# Patient Record
Sex: FEMALE | Race: WHITE | Employment: OTHER | URBAN - NONMETROPOLITAN AREA
[De-identification: names, ages, dates, MRNs, and addresses within clinical notes are randomized per-mention and may not be internally consistent; named-entity substitution may affect disease eponyms.]

---

## 2017-11-14 ENCOUNTER — HOSPITAL ENCOUNTER (OUTPATIENT)
Dept: NON INVASIVE DIAGNOSTICS | Age: 67
Discharge: HOME | End: 2017-11-14
Payer: MEDICARE

## 2017-11-14 PROCEDURE — 93270 REMOTE 30 DAY ECG REV/REPORT: CPT

## 2018-03-01 ENCOUNTER — OFFICE VISIT (OUTPATIENT)
Dept: FAMILY MEDICINE CLINIC | Age: 68
End: 2018-03-01
Payer: MEDICARE

## 2018-03-01 ENCOUNTER — TELEPHONE (OUTPATIENT)
Dept: FAMILY MEDICINE CLINIC | Age: 68
End: 2018-03-01

## 2018-03-01 ENCOUNTER — HOSPITAL ENCOUNTER (OUTPATIENT)
Dept: GENERAL RADIOLOGY | Age: 68
Discharge: HOME | End: 2018-03-01
Payer: MEDICARE

## 2018-03-01 ENCOUNTER — HOSPITAL ENCOUNTER (OUTPATIENT)
Age: 68
Discharge: HOME | End: 2018-03-01
Payer: MEDICARE

## 2018-03-01 VITALS
SYSTOLIC BLOOD PRESSURE: 130 MMHG | DIASTOLIC BLOOD PRESSURE: 86 MMHG | WEIGHT: 150 LBS | HEART RATE: 68 BPM | BODY MASS INDEX: 26.58 KG/M2 | HEIGHT: 63 IN

## 2018-03-01 DIAGNOSIS — M25.511 CHRONIC RIGHT SHOULDER PAIN: ICD-10-CM

## 2018-03-01 DIAGNOSIS — G89.29 CHRONIC RIGHT SHOULDER PAIN: Primary | ICD-10-CM

## 2018-03-01 DIAGNOSIS — G89.29 CHRONIC RIGHT SHOULDER PAIN: ICD-10-CM

## 2018-03-01 DIAGNOSIS — I49.9 IRREGULAR HEART BEAT: ICD-10-CM

## 2018-03-01 DIAGNOSIS — M25.511 CHRONIC RIGHT SHOULDER PAIN: Primary | ICD-10-CM

## 2018-03-01 DIAGNOSIS — M85.89 OSTEOPENIA OF MULTIPLE SITES: ICD-10-CM

## 2018-03-01 DIAGNOSIS — E78.00 PURE HYPERCHOLESTEROLEMIA: ICD-10-CM

## 2018-03-01 DIAGNOSIS — M19.011 PRIMARY OSTEOARTHRITIS OF RIGHT SHOULDER: Primary | ICD-10-CM

## 2018-03-01 DIAGNOSIS — I10 ESSENTIAL HYPERTENSION: ICD-10-CM

## 2018-03-01 PROCEDURE — 99204 OFFICE O/P NEW MOD 45 MIN: CPT | Performed by: FAMILY MEDICINE

## 2018-03-01 PROCEDURE — 73030 X-RAY EXAM OF SHOULDER: CPT

## 2018-03-01 RX ORDER — EZETIMIBE 10 MG/1
10 TABLET ORAL DAILY
COMMUNITY
End: 2018-03-01 | Stop reason: SDUPTHER

## 2018-03-01 RX ORDER — ALENDRONATE SODIUM 70 MG/1
70 TABLET ORAL
Qty: 12 TABLET | Refills: 2 | Status: SHIPPED | OUTPATIENT
Start: 2018-03-01 | End: 2018-03-05 | Stop reason: SDUPTHER

## 2018-03-01 RX ORDER — ALENDRONATE SODIUM 70 MG/1
70 TABLET ORAL
COMMUNITY
End: 2018-03-01 | Stop reason: SDUPTHER

## 2018-03-01 RX ORDER — ROSUVASTATIN CALCIUM 20 MG/1
20 TABLET, COATED ORAL DAILY
Qty: 90 TABLET | Refills: 1 | Status: SHIPPED | OUTPATIENT
Start: 2018-03-01 | End: 2018-03-05 | Stop reason: SDUPTHER

## 2018-03-01 RX ORDER — ROSUVASTATIN CALCIUM 20 MG/1
20 TABLET, COATED ORAL DAILY
COMMUNITY
End: 2018-03-01 | Stop reason: SDUPTHER

## 2018-03-01 RX ORDER — LOSARTAN POTASSIUM 50 MG/1
50 TABLET ORAL DAILY
COMMUNITY
End: 2018-03-01 | Stop reason: SDUPTHER

## 2018-03-01 RX ORDER — LOSARTAN POTASSIUM 50 MG/1
50 TABLET ORAL DAILY
Qty: 90 TABLET | Refills: 1 | Status: SHIPPED | OUTPATIENT
Start: 2018-03-01 | End: 2018-03-05 | Stop reason: SDUPTHER

## 2018-03-01 RX ORDER — EZETIMIBE 10 MG/1
10 TABLET ORAL DAILY
Qty: 90 TABLET | Refills: 1 | Status: SHIPPED | OUTPATIENT
Start: 2018-03-01 | End: 2018-03-05 | Stop reason: SDUPTHER

## 2018-03-01 ASSESSMENT — ENCOUNTER SYMPTOMS
CHEST TIGHTNESS: 0
COLOR CHANGE: 0
DIARRHEA: 0
EYE REDNESS: 0
EYE DISCHARGE: 0
BLOOD IN STOOL: 0
CONSTIPATION: 0
SHORTNESS OF BREATH: 0

## 2018-03-01 ASSESSMENT — PATIENT HEALTH QUESTIONNAIRE - PHQ9
SUM OF ALL RESPONSES TO PHQ9 QUESTIONS 1 & 2: 0
2. FEELING DOWN, DEPRESSED OR HOPELESS: 0
SUM OF ALL RESPONSES TO PHQ QUESTIONS 1-9: 0
1. LITTLE INTEREST OR PLEASURE IN DOING THINGS: 0

## 2018-03-01 NOTE — PATIENT INSTRUCTIONS
arm (palm outward) behind your back by the wrist. Pull your arm up gently to stretch your shoulder. 3. Next, put a towel over your other shoulder. Put the hand of your injured arm behind your back. Now hold the back end of the towel. With the other hand, hold the front end of the towel in front of your body. Pull gently on the front end of the towel. This will bring your hand farther up your back to stretch your shoulder. Overhead stretch    1. Standing about an arm's length away, grasp onto a solid surface. You could use a countertop, a doorknob, or the back of a sturdy chair. 2. With your knees slightly bent, bend forward with your arms straight. Lower your upper body, and let your shoulders stretch. 3. As your shoulders are able to stretch farther, you may need to take a step or two backward. 4. Hold for at least 15 to 30 seconds. Then stand up and relax. If you had stepped back during your stretch, step forward so you can keep your hands on the solid surface. 5. Repeat 2 to 4 times. Shoulder flexion (lying down)    To make a wand for this exercise, use a piece of PVC pipe or a broom handle with the broom removed. Make the wand about a foot wider than your shoulders. 1. Lie on your back, holding a wand with both hands. Your palms should face down as you hold the wand. 2. Keeping your elbows straight, slowly raise your arms over your head. Raise them until you feel a stretch in your shoulders, upper back, and chest.  3. Hold for 15 to 30 seconds. 4. Repeat 2 to 4 times. Shoulder rotation (lying down)    To make a wand for this exercise, use a piece of PVC pipe or a broom handle with the broom removed. Make the wand about a foot wider than your shoulders. 1. Lie on your back. Hold a wand with both hands with your elbows bent and palms up. 2. Keep your elbows close to your body, and move the wand across your body toward the sore arm. 3. Hold for 8 to 12 seconds. 4. Repeat 2 to 4 times.   Wall climbing (to the side)    Avoid any movement that is straight to your side, and be careful not to arch your back. Your arm should stay about 30 degrees to the front of your side. 1. Stand with your side to a wall so that your fingers can just touch it at an angle about 30 degrees toward the front of your body. 2. Walk the fingers of your injured arm up the wall as high as pain permits. Try not to shrug your shoulder up toward your ear as you move your arm up. 3. Hold that position for a count of at least 15 to 20.  4. Walk your fingers back down to the starting position. 5. Repeat at least 2 to 4 times. Try to reach higher each time. Wall climbing (to the front)    During this stretching exercise, be careful not to arch your back. 1. Face a wall, and stand so your fingers can just touch it. 2. Keeping your shoulder down, walk the fingers of your injured arm up the wall as high as pain permits. (Don't shrug your shoulder up toward your ear.)  3. Hold your arm in that position for at least 15 to 30 seconds. 4. Slowly walk your fingers back down to where you started. 5. Repeat at least 2 to 4 times. Try to reach higher each time. Shoulder blade squeeze    1. Stand with your arms at your sides, and squeeze your shoulder blades together. Do not raise your shoulders up as you squeeze. 2. Hold 6 seconds. 3. Repeat 8 to 12 times. Scapular exercise: Arm reach    1. Lie flat on your back. This exercise is a very slight motion that starts with your arms raised (elbows straight, arms straight). 2. From this position, reach higher toward the yesy or ceiling. Keep your elbows straight. All motion should be from your shoulder blade only. 3. Relax your arms back to where you started. 4. Repeat 8 to 12 times. Arm raise to the side    During this strengthening exercise, your arm should stay about 30 degrees to the front of your side. 1. Slowly raise your injured arm to the side, with your thumb facing up.  Raise your arm 60

## 2018-03-01 NOTE — PROGRESS NOTES
[address]  [address] [facility], [facility], [place]  Phone:  [phone]  Fax:  [phone]         Name: Aj Jensen  : 1950         Chief Complaint:     Chief Complaint   Patient presents with    New Patient     right shoulder pain, feels sometimes it pops at times   [facility] Maintenance     Colonoscopy 2017 Dr Winter Brewster-        History of Present Illness:     Britt Rios is a 78 yo female who presents today to establish as a new patient for evaluation of right shoulder pain that began a few months ago with no inciting injury. Pain is worse with certain motions like reaching behind her back. Shoulder Pain    The pain is present in the right shoulder. The current episode started more than 1 month ago. There has been no history of extremity trauma. The problem occurs intermittently. The problem has been gradually worsening. The quality of the pain is described as aching. The pain is moderate. Associated symptoms include a limited range of motion. Pertinent negatives include no fever, itching, numbness or tingling. The symptoms are aggravated by activity. She has tried rest for the symptoms. Hypertension   This is a chronic problem. The current episode started more than 1 year ago. The problem is unchanged. The problem is controlled. Associated symptoms include palpitations. Pertinent negatives include no chest pain, headaches, peripheral edema or shortness of breath. There are no associated agents to hypertension. Risk factors for coronary artery disease include dyslipidemia and post-menopausal state. Past treatments include angiotensin blockers. The current treatment provides moderate improvement. There are no compliance problems. Hyperlipidemia   This is a chronic problem. The current episode started more than 1 year ago. Associated symptoms include myalgias. Pertinent negatives include no chest pain or shortness of breath.  Current antihyperlipidemic

## 2018-03-01 NOTE — TELEPHONE ENCOUNTER
----- Message from Brenda Alba MD sent at 3/1/2018  2:21 PM EST -----  Please let the patient know that her shoulder x-ray revealed moderate degenerative changes/arthritis. We can try a course of PT or if it's bothering her enough, I can refer her to Ortho for further evaluation. Please determine which she would prefer and where she'd want to do therapy.

## 2018-03-05 RX ORDER — ALENDRONATE SODIUM 70 MG/1
70 TABLET ORAL
Qty: 12 TABLET | Refills: 2 | Status: SHIPPED | OUTPATIENT
Start: 2018-03-05 | End: 2018-11-13 | Stop reason: SDUPTHER

## 2018-03-05 RX ORDER — EZETIMIBE 10 MG/1
10 TABLET ORAL DAILY
Qty: 90 TABLET | Refills: 1 | Status: SHIPPED | OUTPATIENT
Start: 2018-03-05 | End: 2018-09-01 | Stop reason: SDUPTHER

## 2018-03-05 RX ORDER — ROSUVASTATIN CALCIUM 20 MG/1
20 TABLET, COATED ORAL DAILY
Qty: 90 TABLET | Refills: 1 | Status: SHIPPED | OUTPATIENT
Start: 2018-03-05 | End: 2018-09-01 | Stop reason: SDUPTHER

## 2018-03-05 RX ORDER — LOSARTAN POTASSIUM 50 MG/1
50 TABLET ORAL DAILY
Qty: 90 TABLET | Refills: 1 | Status: SHIPPED | OUTPATIENT
Start: 2018-03-05 | End: 2018-09-01 | Stop reason: SDUPTHER

## 2018-03-06 ENCOUNTER — TELEPHONE (OUTPATIENT)
Dept: FAMILY MEDICINE CLINIC | Age: 68
End: 2018-03-06

## 2018-03-06 ENCOUNTER — HOSPITAL ENCOUNTER (OUTPATIENT)
Age: 68
Discharge: HOME | End: 2018-03-06
Payer: MEDICARE

## 2018-03-06 DIAGNOSIS — I49.9 IRREGULAR HEART BEAT: ICD-10-CM

## 2018-03-06 LAB
ANION GAP SERPL CALCULATED.3IONS-SCNC: 13 MEQ/L (ref 8–16)
BUN BLDV-MCNC: 15 MG/DL (ref 7–22)
CALCIUM SERPL-MCNC: 10 MG/DL (ref 8.5–10.5)
CHLORIDE BLD-SCNC: 103 MEQ/L (ref 98–111)
CO2: 28 MEQ/L (ref 23–33)
CREAT SERPL-MCNC: 0.5 MG/DL (ref 0.4–1.2)
GFR SERPL CREATININE-BSD FRML MDRD: > 90 ML/MIN/1.73M2
GLUCOSE BLD-MCNC: 91 MG/DL (ref 70–108)
POTASSIUM SERPL-SCNC: 4.9 MEQ/L (ref 3.5–5.2)
SODIUM BLD-SCNC: 144 MEQ/L (ref 135–145)
TSH SERPL DL<=0.05 MIU/L-ACNC: 2.28 UIU/ML (ref 0.4–4.2)

## 2018-03-06 PROCEDURE — 84443 ASSAY THYROID STIM HORMONE: CPT

## 2018-03-06 PROCEDURE — 80048 BASIC METABOLIC PNL TOTAL CA: CPT

## 2018-03-06 PROCEDURE — 36415 COLL VENOUS BLD VENIPUNCTURE: CPT

## 2018-03-06 NOTE — TELEPHONE ENCOUNTER
----- Message from Dana Lopez MD sent at 3/6/2018  3:37 PM EST -----  Please let the patient know that her labs were WNL.

## 2018-03-27 ENCOUNTER — TELEPHONE (OUTPATIENT)
Dept: FAMILY MEDICINE CLINIC | Age: 68
End: 2018-03-27

## 2018-03-27 ENCOUNTER — HOSPITAL ENCOUNTER (OUTPATIENT)
Dept: WOMENS IMAGING | Age: 68
Discharge: HOME | End: 2018-03-27
Payer: MEDICARE

## 2018-03-27 DIAGNOSIS — M85.89 OSTEOPENIA OF MULTIPLE SITES: ICD-10-CM

## 2018-03-27 PROCEDURE — 77080 DXA BONE DENSITY AXIAL: CPT

## 2018-03-27 NOTE — TELEPHONE ENCOUNTER
----- Message from Raúl Summers MD sent at 3/27/2018  4:40 PM EDT -----  Please let the patient know that her DEXA revealed no increased risk for fracture/no osteoporosis.

## 2018-04-20 ENCOUNTER — HOSPITAL ENCOUNTER (OUTPATIENT)
Dept: MRI IMAGING | Age: 68
Discharge: HOME | End: 2018-04-20
Payer: MEDICARE

## 2018-04-20 DIAGNOSIS — M19.011 ARTHRITIS OF RIGHT SHOULDER REGION: ICD-10-CM

## 2018-04-20 PROCEDURE — 73221 MRI JOINT UPR EXTREM W/O DYE: CPT

## 2018-05-03 ENCOUNTER — OFFICE VISIT (OUTPATIENT)
Dept: FAMILY MEDICINE CLINIC | Age: 68
End: 2018-05-03
Payer: MEDICARE

## 2018-05-03 ENCOUNTER — HOSPITAL ENCOUNTER (OUTPATIENT)
Age: 68
Discharge: HOME | End: 2018-05-03
Payer: MEDICARE

## 2018-05-03 VITALS
SYSTOLIC BLOOD PRESSURE: 112 MMHG | HEART RATE: 38 BPM | DIASTOLIC BLOOD PRESSURE: 72 MMHG | HEIGHT: 63 IN | BODY MASS INDEX: 28 KG/M2 | WEIGHT: 158 LBS

## 2018-05-03 DIAGNOSIS — G89.29 CHRONIC RIGHT SHOULDER PAIN: Primary | ICD-10-CM

## 2018-05-03 DIAGNOSIS — M25.511 CHRONIC RIGHT SHOULDER PAIN: Primary | ICD-10-CM

## 2018-05-03 DIAGNOSIS — R00.1 BRADYCARDIA: ICD-10-CM

## 2018-05-03 LAB
EKG ATRIAL RATE: 71 BPM
EKG P AXIS: 52 DEGREES
EKG P-R INTERVAL: 158 MS
EKG Q-T INTERVAL: 378 MS
EKG QRS DURATION: 74 MS
EKG QTC CALCULATION (BAZETT): 410 MS
EKG R AXIS: 30 DEGREES
EKG T AXIS: 17 DEGREES
EKG VENTRICULAR RATE: 71 BPM

## 2018-05-03 PROCEDURE — 93005 ELECTROCARDIOGRAM TRACING: CPT

## 2018-05-03 PROCEDURE — 99214 OFFICE O/P EST MOD 30 MIN: CPT | Performed by: FAMILY MEDICINE

## 2018-05-03 ASSESSMENT — ENCOUNTER SYMPTOMS
CHEST TIGHTNESS: 0
VOMITING: 0
SHORTNESS OF BREATH: 0
NAUSEA: 0
EYE REDNESS: 0
EYE DISCHARGE: 0
COLOR CHANGE: 0

## 2018-05-04 ENCOUNTER — OFFICE VISIT (OUTPATIENT)
Dept: CARDIOLOGY CLINIC | Age: 68
End: 2018-05-04
Payer: MEDICARE

## 2018-05-04 VITALS
HEART RATE: 56 BPM | DIASTOLIC BLOOD PRESSURE: 72 MMHG | WEIGHT: 151 LBS | SYSTOLIC BLOOD PRESSURE: 136 MMHG | HEIGHT: 63 IN | BODY MASS INDEX: 26.75 KG/M2

## 2018-05-04 DIAGNOSIS — I49.3 PVC'S (PREMATURE VENTRICULAR CONTRACTIONS): ICD-10-CM

## 2018-05-04 DIAGNOSIS — R00.1 BRADYCARDIA: Primary | ICD-10-CM

## 2018-05-04 DIAGNOSIS — I83.93 VARICOSE VEINS OF BOTH LOWER EXTREMITIES: ICD-10-CM

## 2018-05-04 PROCEDURE — 99203 OFFICE O/P NEW LOW 30 MIN: CPT | Performed by: INTERNAL MEDICINE

## 2018-05-04 ASSESSMENT — ENCOUNTER SYMPTOMS
EYE PAIN: 0
CONSTIPATION: 0
EYE ITCHING: 0
SINUS PRESSURE: 0
BLOOD IN STOOL: 0
EYE DISCHARGE: 0
APNEA: 0
SHORTNESS OF BREATH: 0
ABDOMINAL DISTENTION: 0
SORE THROAT: 0
COUGH: 0
ABDOMINAL PAIN: 0
DIARRHEA: 0
EYE REDNESS: 0
BACK PAIN: 0
CHEST TIGHTNESS: 0
NAUSEA: 0

## 2018-08-17 ENCOUNTER — OFFICE VISIT (OUTPATIENT)
Dept: CARDIOLOGY CLINIC | Age: 68
End: 2018-08-17
Payer: MEDICARE

## 2018-08-17 VITALS
WEIGHT: 150.6 LBS | HEART RATE: 84 BPM | SYSTOLIC BLOOD PRESSURE: 149 MMHG | HEIGHT: 63 IN | BODY MASS INDEX: 26.68 KG/M2 | DIASTOLIC BLOOD PRESSURE: 70 MMHG

## 2018-08-17 DIAGNOSIS — I10 ESSENTIAL HYPERTENSION: Primary | ICD-10-CM

## 2018-08-17 DIAGNOSIS — Z01.818 PREOP EXAMINATION: ICD-10-CM

## 2018-08-17 PROCEDURE — 99213 OFFICE O/P EST LOW 20 MIN: CPT | Performed by: INTERNAL MEDICINE

## 2018-08-17 PROCEDURE — 93000 ELECTROCARDIOGRAM COMPLETE: CPT | Performed by: INTERNAL MEDICINE

## 2018-08-17 NOTE — PROGRESS NOTES
[address] [facility]  [address]  [address] [address]  Dept: [phone]  Dept Fax: [phone]  Loc: [phone]    Visit Date: 8/17/2018    Ms. Narciso Domínguez is a 77 y.o. female  who presented for:  Chief Complaint   Patient presents with    3 Month Follow-Up     bradycardia    Hypertension       HPI:   HPI   76 yo F f/u for pre-op evaluation. S/p RLE with phlebectomy. Will be getting LLE phlebectomy. No chest pain, angina, VASQUEZ, orthopnea, PND, sob at rest, palpitations, LE edema, or syncope. ECG with SR/PVCS - asymptomatic. No issues with FOS. No CVA, MI, DM II, or CKD. Current Outpatient Prescriptions:     rosuvastatin (CRESTOR) 20 MG tablet, Take 1 tablet by mouth daily, Disp: 90 tablet, Rfl: 1    alendronate (FOSAMAX) 70 MG tablet, Take 1 tablet by mouth every 7 days, Disp: 12 tablet, Rfl: 2    losartan (COZAAR) 50 MG tablet, Take 1 tablet by mouth daily, Disp: 90 tablet, Rfl: 1    ezetimibe (ZETIA) 10 MG tablet, Take 1 tablet by mouth daily, Disp: 90 tablet, Rfl: 1    aspirin 81 MG tablet, Take 81 mg by mouth daily, Disp: , Rfl:     Multiple Vitamins-Minerals (WOMENS 50+ MULTI VITAMIN/MIN) TABS, Take by mouth, Disp: , Rfl:     Multiple Vitamins-Minerals (OCUVITE EYE HEATLH GUMMIES PO), Take by mouth, Disp: , Rfl:     Calcium Carb-Cholecalciferol (CALTRATE 600+D3 SOFT PO), Take by mouth, Disp: , Rfl:     Past Medical History  Julio Colorado  has a past medical history of Hyperlipidemia; Hypertension; and Osteopenia. Social History  Julio Colorado  reports that she has never smoked. She has never used smokeless tobacco. She reports that she drinks alcohol. She reports that she does not use drugs. Family History  Julio Colorado family history includes Colon Cancer in her mother; Heart Attack in her brother; Heart Disease in her sister; Heart Failure in her father; Stomach Cancer in her paternal grandfather.     There is no family history of bicuspid aortic valve, aneurysms, heart transplant, pacemakers, defibrillators, or sudden cardiac death. Past Surgical History   Past Surgical History:   Procedure Laterality Date    BREAST SURGERY      Dr. Liliya Ballesteros; Benign left breast lesion    COLONOSCOPY      Dr Sharona Barahona   last 8-2017    INGUINAL HERNIA REPAIR      TUBAL LIGATION      VEIN SURGERY Right 08/08/2018    Endoveous Ablation       Review of Systems   Constitutional: Negative for chills and fever  HENT: Negative for congestion, sinus pressure, sneezing and sore throat. Eyes: Negative for pain, discharge, redness and itching. Respiratory: Negative for apnea, cough, chest tightness and shortness of breath. Gastrointestinal: Negative for abdominal distention, abdominal pain, blood in stool, constipation, diarrhea and nausea. Endocrine: Negative for cold intolerance, heat intolerance, polydipsia. Genitourinary: Negative for dysuria, enuresis, flank pain and hematuria. Musculoskeletal: Negative for arthralgias, joint swelling and neck pain. Neurological: Negative for numbness and headaches. Psychiatric/Behavioral: Negative for agitation, confusion, decreased concentration and dysphoric mood. Objective:     BP (!) 158/70   Pulse 84   Ht 5' 3\" (1.6 m)   Wt 150 lb 9.6 oz (68.3 kg)   BMI 26.68 kg/m²     Wt Readings from Last 3 Encounters:   08/17/18 150 lb 9.6 oz (68.3 kg)   05/04/18 151 lb (68.5 kg)   05/03/18 158 lb (71.7 kg)     BP Readings from Last 3 Encounters:   08/17/18 (!) 158/70   05/04/18 136/72   05/03/18 112/72       Nursing note and vitals reviewed. Physical Exam   Constitutional: He is oriented to person, place, and time. He appears well-developed and well-nourished. HENT:   Head: Normocephalic and atraumatic. Eyes: EOM are normal. Pupils are equal, round, and reactive to light. Neck: Normal range of motion. Neck supple. No JVD present.    Cardiovascular: Normal rate, regular rhythm, normal heart sounds and intact distal pulses. No murmur heard. Pulmonary/Chest: Effort normal and breath sounds normal. No respiratory distress. He has no wheezes. He has no rales. Abdominal: Soft. Bowel sounds are normal. He exhibits no distension. There is no tenderness. Musculoskeletal: Normal range of motion. He exhibits no edema. Neurological: He is alert and oriented to person, place, and time. No cranial nerve deficit. Coordination normal.   Skin: Skin is warm and dry. RLE with compression stocking. Psychiatric: He has a normal mood and affect. No results found for: CKTOTAL, CKMB, CKMBINDEX    No results found for: WBC, RBC, HGB, HCT, MCV, MCH, MCHC, RDW, PLT, MPV    Lab Results   Component Value Date     03/06/2018    K 4.9 03/06/2018     03/06/2018    CO2 28 03/06/2018    BUN 15 03/06/2018    CREATININE 0.5 03/06/2018    CALCIUM 10.0 03/06/2018    LABGLOM >90 03/06/2018    GLUCOSE 91 03/06/2018       No results found for: ALKPHOS, ALT, AST, PROT, BILITOT, BILIDIR, IBILI, LABALBU    No results found for: MG    No results found for: INR, PROTIME      No results found for: LABA1C    No results found for: TRIG, HDL, LDLCALC, LDLDIRECT, LABVLDL    Lab Results   Component Value Date    TSH 2.280 03/06/2018         Testing Reviewed:      I have individually reviewed the cardiac test below:    ECHO: No results found for this or any previous visit. Assessment/Plan   Asymptomatic Bradycardia  S/p RLE phlebectomy  Pre-operative CV exam  Possible upcoming Orthopedic surgery (Intermediate to high risk surgery)  RCRI = 0 (Low risk patient)  No active cardiac complaints. No cardiac issues on exams. Able to do > 4 METS. Patient accepts the risk of the planned procedure and understands the possibility of christopher-operative cardiac event, including but not limited to MI, VT/VF, cardiac arrest, and death, and wishes to proceed with the procedure.     No further cardiac testing prior to upcoming

## 2018-08-17 NOTE — PROGRESS NOTES
Pt needs clearance for right shoulder resurfacing on Oct.3 with Dr. Amy Asif. Needs clearance for Left STAB Phlebectomy of Varicose Vein in Sept. With Dr. Pipo Aparicio.    Pt did not bring medication or list, pt stated no changes. EKG done today!

## 2018-09-04 RX ORDER — EZETIMIBE 10 MG/1
TABLET ORAL
Qty: 90 TABLET | Refills: 1 | Status: SHIPPED | OUTPATIENT
Start: 2018-09-04 | End: 2019-03-03 | Stop reason: SDUPTHER

## 2018-09-04 RX ORDER — LOSARTAN POTASSIUM 50 MG/1
TABLET ORAL
Qty: 90 TABLET | Refills: 1 | Status: SHIPPED | OUTPATIENT
Start: 2018-09-04 | End: 2019-03-03 | Stop reason: SDUPTHER

## 2018-09-04 RX ORDER — ROSUVASTATIN CALCIUM 20 MG/1
TABLET, COATED ORAL
Qty: 90 TABLET | Refills: 1 | Status: SHIPPED | OUTPATIENT
Start: 2018-09-04 | End: 2019-03-03 | Stop reason: SDUPTHER

## 2018-09-10 ASSESSMENT — ENCOUNTER SYMPTOMS
SHORTNESS OF BREATH: 0
EYE REDNESS: 0
RHINORRHEA: 0
SORE THROAT: 0
COUGH: 0
VOMITING: 0
NAUSEA: 0

## 2018-09-10 NOTE — PROGRESS NOTES
[address] [place] [address] [address]  Phone:  [phone]  Fax:  [phone]    PREOPERATIVE CLEARANCE     Name: Dannie Jimenez  : 1950         Chief Complaint:     Chief Complaint   Patient presents with    Pre-op Exam     was cleared by Cardiology 2018; surgery with Dr. Amee Irving 10/03/2018       History of Present Illness: The patient is presenting today for preoperative clearance for a right total shoulder arthroplasty with Dr. Joao Juárez at [facility] in [place]. She saw Dr. Rocío Whitt on 18 and was given clearance. Functional capacity:  > 4 mets (can vacuum/do housework, climb a flight of stairs without dyspnea)? Yes    Stress test or cardiac cath within last 5 years? No       If so, results:  N/A  Any change in cardiac symptoms?:  No  Revascularization in last 5 years?:  No       CABG?:  N/A       Stents?:  N/A    Neck pathology?:  No  Sleep apnea?:  No      Past Medical History:     Past Medical History:   Diagnosis Date    Hyperlipidemia     Hypertension     Osteopenia         Past Surgical History:     Past Surgical History:   Procedure Laterality Date    BREAST SURGERY      Dr. Eliza Canela; Benign left breast lesion    COLONOSCOPY      Dr Adore Liang   last     INGUINAL HERNIA REPAIR      TUBAL LIGATION      VEIN SURGERY Right 2018    Endoveous Ablation        Family History:     Family History   Problem Relation Age of Onset    Colon Cancer Mother     Heart Failure Father     Heart Disease Sister     Heart Attack Brother     Stomach Cancer Paternal Grandfather        Social History:     Social:    Social History     Social History    Marital status:      Spouse name: N/A    Number of children: N/A    Years of education: N/A     Occupational History    Not on file.      Social History Main Topics    Smoking status: Never Smoker    Smokeless tobacco: Never Used    Alcohol use Yes      Comment: normal and breath sounds normal. No respiratory distress. She has no wheezes. Abdominal: Soft. Bowel sounds are normal. She exhibits no distension. There is no tenderness. Neurological: She is alert and oriented to person, place, and time. Skin: Skin is warm and dry. No rash noted. No erythema. Psychiatric: She has a normal mood and affect. Her behavior is normal.   Vitals reviewed. Assesment:         Diagnosis Orders   1. Preoperative clearance  CBC Auto Differential    Basic Metabolic Panel   2. Arthritis of right shoulder region         Plan:     The patient is considered low risk for the upcoming surgical procedure. Preoperative paperwork was completed and faxed in. I would like to see Manuel Campos back in Return for previously scheduled appointment in February. or sooner if any new issues occur.     Electronically signed by Torsten Manning MD on 9/11/2018 at 2:19 PM

## 2018-09-11 ENCOUNTER — OFFICE VISIT (OUTPATIENT)
Dept: FAMILY MEDICINE CLINIC | Age: 68
End: 2018-09-11
Payer: MEDICARE

## 2018-09-11 VITALS
HEIGHT: 63 IN | HEART RATE: 66 BPM | WEIGHT: 150 LBS | SYSTOLIC BLOOD PRESSURE: 160 MMHG | DIASTOLIC BLOOD PRESSURE: 84 MMHG | BODY MASS INDEX: 26.58 KG/M2

## 2018-09-11 DIAGNOSIS — M19.011 ARTHRITIS OF RIGHT SHOULDER REGION: ICD-10-CM

## 2018-09-11 DIAGNOSIS — Z01.818 PREOPERATIVE CLEARANCE: Primary | ICD-10-CM

## 2018-09-11 PROCEDURE — 99213 OFFICE O/P EST LOW 20 MIN: CPT | Performed by: FAMILY MEDICINE

## 2018-09-12 ENCOUNTER — TELEPHONE (OUTPATIENT)
Dept: FAMILY MEDICINE CLINIC | Age: 68
End: 2018-09-12

## 2018-09-12 ENCOUNTER — HOSPITAL ENCOUNTER (OUTPATIENT)
Age: 68
Discharge: HOME | End: 2018-09-12
Payer: MEDICARE

## 2018-09-12 DIAGNOSIS — Z01.818 PREOPERATIVE CLEARANCE: ICD-10-CM

## 2018-09-12 LAB
ANION GAP SERPL CALCULATED.3IONS-SCNC: 16 MEQ/L (ref 8–16)
BASOPHILS # BLD: 0.6 %
BASOPHILS ABSOLUTE: 0 THOU/MM3 (ref 0–0.1)
BUN BLDV-MCNC: 18 MG/DL (ref 7–22)
CALCIUM SERPL-MCNC: 9.5 MG/DL (ref 8.5–10.5)
CHLORIDE BLD-SCNC: 103 MEQ/L (ref 98–111)
CO2: 24 MEQ/L (ref 23–33)
CREAT SERPL-MCNC: 0.6 MG/DL (ref 0.4–1.2)
EOSINOPHIL # BLD: 5.7 %
EOSINOPHILS ABSOLUTE: 0.3 THOU/MM3 (ref 0–0.4)
ERYTHROCYTE [DISTWIDTH] IN BLOOD BY AUTOMATED COUNT: 12.7 % (ref 11.5–14.5)
ERYTHROCYTE [DISTWIDTH] IN BLOOD BY AUTOMATED COUNT: 47.5 FL (ref 35–45)
GFR SERPL CREATININE-BSD FRML MDRD: > 90 ML/MIN/1.73M2
GLUCOSE BLD-MCNC: 87 MG/DL (ref 70–108)
HCT VFR BLD CALC: 43.4 % (ref 37–47)
HEMOGLOBIN: 14.4 GM/DL (ref 12–16)
IMMATURE GRANS (ABS): 0.01 THOU/MM3 (ref 0–0.07)
IMMATURE GRANULOCYTES: 0.2 %
LYMPHOCYTES # BLD: 33.4 %
LYMPHOCYTES ABSOLUTE: 1.6 THOU/MM3 (ref 1–4.8)
MCH RBC QN AUTO: 33.1 PG (ref 26–33)
MCHC RBC AUTO-ENTMCNC: 33.2 GM/DL (ref 32.2–35.5)
MCV RBC AUTO: 99.8 FL (ref 81–99)
MONOCYTES # BLD: 7.7 %
MONOCYTES ABSOLUTE: 0.4 THOU/MM3 (ref 0.4–1.3)
NUCLEATED RED BLOOD CELLS: 0 /100 WBC
PLATELET # BLD: 255 THOU/MM3 (ref 130–400)
PMV BLD AUTO: 11.6 FL (ref 9.4–12.4)
POTASSIUM SERPL-SCNC: 4.5 MEQ/L (ref 3.5–5.2)
RBC # BLD: 4.35 MILL/MM3 (ref 4.2–5.4)
SEG NEUTROPHILS: 52.4 %
SEGMENTED NEUTROPHILS ABSOLUTE COUNT: 2.6 THOU/MM3 (ref 1.8–7.7)
SODIUM BLD-SCNC: 143 MEQ/L (ref 135–145)
WBC # BLD: 4.9 THOU/MM3 (ref 4.8–10.8)

## 2018-09-12 PROCEDURE — 80048 BASIC METABOLIC PNL TOTAL CA: CPT

## 2018-09-12 PROCEDURE — 36415 COLL VENOUS BLD VENIPUNCTURE: CPT

## 2018-09-12 PROCEDURE — 85025 COMPLETE CBC W/AUTO DIFF WBC: CPT

## 2018-11-01 ENCOUNTER — HOSPITAL ENCOUNTER (OUTPATIENT)
Dept: OCCUPATIONAL THERAPY | Age: 68
Setting detail: THERAPIES SERIES
Discharge: HOME | End: 2018-11-01
Payer: MEDICARE

## 2018-11-01 PROCEDURE — 97165 OT EVAL LOW COMPLEX 30 MIN: CPT

## 2018-11-01 PROCEDURE — 97110 THERAPEUTIC EXERCISES: CPT

## 2018-11-01 PROCEDURE — G8987 SELF CARE CURRENT STATUS: HCPCS

## 2018-11-01 PROCEDURE — G8988 SELF CARE GOAL STATUS: HCPCS

## 2018-11-05 ENCOUNTER — HOSPITAL ENCOUNTER (OUTPATIENT)
Dept: OCCUPATIONAL THERAPY | Age: 68
Setting detail: THERAPIES SERIES
Discharge: HOME | End: 2018-11-05
Payer: MEDICARE

## 2018-11-05 PROCEDURE — 97110 THERAPEUTIC EXERCISES: CPT

## 2018-11-07 ENCOUNTER — HOSPITAL ENCOUNTER (OUTPATIENT)
Dept: OCCUPATIONAL THERAPY | Age: 68
Setting detail: THERAPIES SERIES
Discharge: HOME | End: 2018-11-07
Payer: MEDICARE

## 2018-11-07 PROCEDURE — 97150 GROUP THERAPEUTIC PROCEDURES: CPT

## 2018-11-07 PROCEDURE — 97110 THERAPEUTIC EXERCISES: CPT

## 2018-11-07 NOTE — PROGRESS NOTES
[address] [address]  OUTPATIENT OCCUPATIONAL THERAPY  Daily Note  [facility]    Time In: 6197  Time Out: 1230  Minutes: 25  Timed Code Treatment Minutes: 15 Minutes     Date: 2018  Patient Name: Casey Joya        CSN: [id number]   : 1950  (77 y.o.)  Gender: female   Referring Practitioner: Dr. Kieran Rubalcava  Diagnosis: s/p right shoulder OA M19.011          General:  OT Visit Information  Onset Date: 18  OT Insurance Information: [employer] Medicare - no ionto, no phono, no hot/cold pack  Total # of Visits to Date: 3  Certification Period Expiration Date: 18  Progress Note Counter: 3/10 for PN  Comments: returns to physician on        Restrictions/Precautions:       Position Activity Restriction  Other position/activity restrictions: Dr. Domenico Foster protocol; osteopenia; HTN         Subjective:  Subjective: States that things are going well. Pain:  Patient Currently in Pain: Denies       Objective:     Upper Extremity Function  UE AROM: scapular retraction x 15 reps  UE PROM: bilateral table slides for shoulder flexion x 15 reps; pendulums with right UE x 15 reps in each direction; supine PROM to right shoulder for flexion and ER at side to patient tolerance - nice PROM preset this date                                                Activity Tolerance: Additional Comments:  Tolerated treatment well    Assessment:  Assessment: Progressing toward goals    Patient Education:     No new patient education completed          Plan:  Plan Comment: Continue per established POC  Specific instructions for Next Treatment: PROM; advance per [address] protocol                      RAVI Jean/DELROY #[id number]

## 2018-11-08 ENCOUNTER — APPOINTMENT (OUTPATIENT)
Dept: OCCUPATIONAL THERAPY | Age: 68
End: 2018-11-08
Payer: MEDICARE

## 2018-11-12 ENCOUNTER — HOSPITAL ENCOUNTER (OUTPATIENT)
Dept: OCCUPATIONAL THERAPY | Age: 68
Setting detail: THERAPIES SERIES
Discharge: HOME | End: 2018-11-12
Payer: MEDICARE

## 2018-11-12 PROCEDURE — 97110 THERAPEUTIC EXERCISES: CPT

## 2018-11-12 ASSESSMENT — PAIN DESCRIPTION - ORIENTATION: ORIENTATION: RIGHT

## 2018-11-12 ASSESSMENT — PAIN DESCRIPTION - LOCATION: LOCATION: SHOULDER

## 2018-11-12 ASSESSMENT — PAIN SCALES - GENERAL: PAINLEVEL_OUTOF10: 1

## 2018-11-13 RX ORDER — ALENDRONATE SODIUM 70 MG/1
TABLET ORAL
Qty: 12 TABLET | Refills: 2 | Status: SHIPPED | OUTPATIENT
Start: 2018-11-13 | End: 2019-07-24 | Stop reason: SDUPTHER

## 2018-11-13 NOTE — TELEPHONE ENCOUNTER
Eulalio Barkley called requesting a refill on the following medications:  Requested Prescriptions     Pending Prescriptions Disp Refills    alendronate (FOSAMAX) 70 MG tablet [Pharmacy Med Name: ALENDRONATE SOD TABS 4'S 70MG] 12 tablet 2     Sig: TAKE 1 TABLET EVERY 7 DAYS       Date of last visit: 9/11/2018  Date of next visit (if applicable):Visit date not found  Date of last refill: #12 x 2 on 3/5/2018  Pharmacy Name: [facility],  Jaden Dunn RN

## 2018-11-15 ENCOUNTER — HOSPITAL ENCOUNTER (OUTPATIENT)
Dept: OCCUPATIONAL THERAPY | Age: 68
Setting detail: THERAPIES SERIES
Discharge: HOME | End: 2018-11-15
Payer: MEDICARE

## 2018-11-15 PROCEDURE — 97110 THERAPEUTIC EXERCISES: CPT

## 2018-11-15 ASSESSMENT — PAIN SCALES - GENERAL: PAINLEVEL_OUTOF10: 1

## 2018-11-15 ASSESSMENT — PAIN DESCRIPTION - ORIENTATION: ORIENTATION: RIGHT

## 2018-11-15 ASSESSMENT — PAIN DESCRIPTION - LOCATION: LOCATION: SHOULDER;ARM

## 2018-11-19 ENCOUNTER — HOSPITAL ENCOUNTER (OUTPATIENT)
Dept: OCCUPATIONAL THERAPY | Age: 68
Setting detail: THERAPIES SERIES
Discharge: HOME | End: 2018-11-19
Payer: MEDICARE

## 2018-11-19 PROCEDURE — 97110 THERAPEUTIC EXERCISES: CPT

## 2018-11-21 ENCOUNTER — HOSPITAL ENCOUNTER (OUTPATIENT)
Dept: OCCUPATIONAL THERAPY | Age: 68
Setting detail: THERAPIES SERIES
Discharge: HOME | End: 2018-11-21
Payer: MEDICARE

## 2018-11-21 PROCEDURE — 97110 THERAPEUTIC EXERCISES: CPT

## 2018-11-26 ENCOUNTER — HOSPITAL ENCOUNTER (OUTPATIENT)
Dept: OCCUPATIONAL THERAPY | Age: 68
Setting detail: THERAPIES SERIES
Discharge: HOME | End: 2018-11-26
Payer: MEDICARE

## 2018-11-26 PROCEDURE — 97110 THERAPEUTIC EXERCISES: CPT

## 2018-11-29 ENCOUNTER — HOSPITAL ENCOUNTER (OUTPATIENT)
Dept: OCCUPATIONAL THERAPY | Age: 68
Setting detail: THERAPIES SERIES
Discharge: HOME | End: 2018-11-29
Payer: MEDICARE

## 2018-11-29 PROCEDURE — G8987 SELF CARE CURRENT STATUS: HCPCS

## 2018-11-29 PROCEDURE — G8988 SELF CARE GOAL STATUS: HCPCS

## 2018-11-29 PROCEDURE — 97110 THERAPEUTIC EXERCISES: CPT

## 2018-11-29 NOTE — PROGRESS NOTES
[address] [address]  OUTPATIENT OCCUPATIONAL THERAPY  Progress Note  [facility].    Time In: 303  Time Out: 0845  Minutes: 30  Timed Code Treatment Minutes: 30 Minutes     Date: 2018  Patient Name: Ariel Morales        CSN: [id number]   : 1950  (77 y.o.)  Gender: female   Referring Practitioner: Dr. Rose Allen  Diagnosis: s/p right shoulder OA M19.011          General:  OT Visit Information  Onset Date: 18  OT Insurance Information: [employer] Medicare - no ionto, no phono, no hot/cold pack  Total # of Visits to Date: 9  Certification Period Expiration Date: 18  Progress Note Counter: PN completed on   Comments: returns to physician on        Restrictions/Precautions:       Position Activity Restriction  Other position/activity restrictions: Dr. Aurora Gomes protocol; osteopenia; HTN         Subjective:  Subjective: States that she saw physican this week and that he was pleased with her progress. States that she no longer has to wear the sling. States that she was using her right arm to do some [place] decorating. States that she was told to respect the pain that she has. Pain:  Patient Currently in Pain: No (No pain at rest today. )       Objective:     Upper Extremity Function  UE AROM: active right shoulder flexion = 135, abduction = 129, IR - can get right thumb 5\" above waistband 6\" to the right of her spine, and ER = 60; supine AROM with right shoulder: flexion x 10 reps, horizontal abduction/adduction x 10 reps, ceiling punches x 10 reps, ceiling circles x 10 reps in each direction  UE PROM: passive right shoulder flexion = 155, abduction = 150, IR = 60, ER at side = 40, ER at 90 = 35; pulleys for shoulder flexion                                                Activity Tolerance: Additional Comments: Tolerated treatment well    Assessment:  Assessment: Patient has made very nice progress toward goals.  Patient is now starting to use right UE in

## 2018-12-10 ENCOUNTER — HOSPITAL ENCOUNTER (OUTPATIENT)
Dept: OCCUPATIONAL THERAPY | Age: 68
Setting detail: THERAPIES SERIES
Discharge: HOME | End: 2018-12-10
Payer: MEDICARE

## 2018-12-10 PROCEDURE — 97110 THERAPEUTIC EXERCISES: CPT

## 2018-12-13 ENCOUNTER — HOSPITAL ENCOUNTER (OUTPATIENT)
Dept: OCCUPATIONAL THERAPY | Age: 68
Setting detail: THERAPIES SERIES
Discharge: HOME | End: 2018-12-13
Payer: MEDICARE

## 2018-12-13 PROCEDURE — 97110 THERAPEUTIC EXERCISES: CPT

## 2018-12-18 ENCOUNTER — APPOINTMENT (OUTPATIENT)
Dept: OCCUPATIONAL THERAPY | Age: 68
End: 2018-12-18
Payer: MEDICARE

## 2018-12-20 ENCOUNTER — HOSPITAL ENCOUNTER (OUTPATIENT)
Dept: OCCUPATIONAL THERAPY | Age: 68
Setting detail: THERAPIES SERIES
Discharge: HOME | End: 2018-12-20
Payer: MEDICARE

## 2018-12-20 PROCEDURE — 97110 THERAPEUTIC EXERCISES: CPT

## 2018-12-27 ENCOUNTER — HOSPITAL ENCOUNTER (OUTPATIENT)
Dept: OCCUPATIONAL THERAPY | Age: 68
Setting detail: THERAPIES SERIES
Discharge: HOME | End: 2018-12-27
Payer: MEDICARE

## 2018-12-27 PROCEDURE — 97110 THERAPEUTIC EXERCISES: CPT

## 2018-12-27 PROCEDURE — G8988 SELF CARE GOAL STATUS: HCPCS

## 2018-12-27 PROCEDURE — G8987 SELF CARE CURRENT STATUS: HCPCS

## 2018-12-27 NOTE — FLOWSHEET NOTE
** PLEASE SIGN, DATE AND TIME CERTIFICATION BELOW AND RETURN TO [facility] OUTPATIENT REHABILITATION (FAX #: [phone]). ATTEST/CO-SIGN IF ACCESSING VIA [employer]. THANK YOU.**    I certify that I have examined the patient below and determined that Physical Medicine and Rehabilitation service is necessary and that I approve the established plan of care for up to 90 days or as specifically noted. Attestation, signature or co-signature of physician indicates approval of certification requirements.    ________________________ ____________ __________  Physician Signature   Date   Time      [address] [facility]  OUTPATIENT OCCUPATIONAL THERAPY  Progress Note  [address] [facility].    Time In: 930  Time Out: 1000  Minutes: 30  Timed Code Treatment Minutes: 30 Minutes     Date: 2018  Patient Name: Cristhian Dean        CSN: [id number]   : 1950  (77 y.o.)  Gender: female   Referring Practitioner: Dr. Margarita Merritt  Diagnosis: s/p right shoulder OA M19.011          General:  OT Visit Information  Onset Date: 18  OT Insurance Information: [employer] Medicare - no ionto, no phono, no hot/cold pack  Total # of Visits to Date: 15  Certification Period Expiration Date: 19  Progress Note Counter: PN completed on   Comments: returns to physician on        Restrictions/Precautions:       Position Activity Restriction  Other position/activity restrictions: Dr. Nanette Tovar protocol; osteopenia; HTN         Subjective:  Subjective: States that was doing very well until she used the can  over the holidays. States that she had some pain in her right arm. States that she wasn't able to get her HEP in regularly over the holidays, but she is back at it now.           Pain:  Patient Currently in Pain: No       Objective:     Upper Extremity Function  UE AROM: active right shoulder flexino = 150, abduction = 150, IR = can get right thumb 1\" above waistband behind back, ER = 70  UE PROM:

## 2019-01-03 ENCOUNTER — HOSPITAL ENCOUNTER (OUTPATIENT)
Dept: OCCUPATIONAL THERAPY | Age: 69
Setting detail: THERAPIES SERIES
Discharge: HOME | End: 2019-01-03
Payer: MEDICARE

## 2019-01-03 PROCEDURE — 97110 THERAPEUTIC EXERCISES: CPT

## 2019-01-07 ENCOUNTER — HOSPITAL ENCOUNTER (OUTPATIENT)
Dept: OCCUPATIONAL THERAPY | Age: 69
Setting detail: THERAPIES SERIES
Discharge: HOME | End: 2019-01-07
Payer: MEDICARE

## 2019-01-07 PROCEDURE — 97110 THERAPEUTIC EXERCISES: CPT

## 2019-01-10 ENCOUNTER — HOSPITAL ENCOUNTER (OUTPATIENT)
Dept: OCCUPATIONAL THERAPY | Age: 69
Setting detail: THERAPIES SERIES
Discharge: HOME | End: 2019-01-10
Payer: MEDICARE

## 2019-01-10 PROCEDURE — 97110 THERAPEUTIC EXERCISES: CPT

## 2019-01-14 ENCOUNTER — HOSPITAL ENCOUNTER (OUTPATIENT)
Dept: OCCUPATIONAL THERAPY | Age: 69
Setting detail: THERAPIES SERIES
Discharge: HOME | End: 2019-01-14
Payer: MEDICARE

## 2019-01-14 PROCEDURE — 97110 THERAPEUTIC EXERCISES: CPT

## 2019-01-15 ENCOUNTER — HOSPITAL ENCOUNTER (OUTPATIENT)
Dept: OCCUPATIONAL THERAPY | Age: 69
Setting detail: THERAPIES SERIES
Discharge: HOME | End: 2019-01-15
Payer: MEDICARE

## 2019-01-15 PROCEDURE — 97110 THERAPEUTIC EXERCISES: CPT

## 2019-01-15 ASSESSMENT — PAIN SCALES - GENERAL: PAINLEVEL_OUTOF10: 3

## 2019-01-15 ASSESSMENT — PAIN DESCRIPTION - ORIENTATION: ORIENTATION: RIGHT;POSTERIOR

## 2019-01-15 ASSESSMENT — PAIN DESCRIPTION - LOCATION: LOCATION: ARM;SHOULDER

## 2019-01-21 ENCOUNTER — APPOINTMENT (OUTPATIENT)
Dept: OCCUPATIONAL THERAPY | Age: 69
End: 2019-01-21
Payer: MEDICARE

## 2019-01-22 ENCOUNTER — HOSPITAL ENCOUNTER (OUTPATIENT)
Dept: OCCUPATIONAL THERAPY | Age: 69
Setting detail: THERAPIES SERIES
Discharge: HOME | End: 2019-01-22
Payer: MEDICARE

## 2019-01-22 PROCEDURE — 97110 THERAPEUTIC EXERCISES: CPT

## 2019-01-24 ENCOUNTER — HOSPITAL ENCOUNTER (OUTPATIENT)
Dept: OCCUPATIONAL THERAPY | Age: 69
Setting detail: THERAPIES SERIES
Discharge: HOME | End: 2019-01-24
Payer: MEDICARE

## 2019-01-24 PROCEDURE — 97110 THERAPEUTIC EXERCISES: CPT

## 2019-01-28 ENCOUNTER — HOSPITAL ENCOUNTER (OUTPATIENT)
Dept: OCCUPATIONAL THERAPY | Age: 69
Setting detail: THERAPIES SERIES
Discharge: HOME | End: 2019-01-28
Payer: MEDICARE

## 2019-01-28 PROCEDURE — 97110 THERAPEUTIC EXERCISES: CPT

## 2019-01-31 ENCOUNTER — HOSPITAL ENCOUNTER (OUTPATIENT)
Dept: OCCUPATIONAL THERAPY | Age: 69
Setting detail: THERAPIES SERIES
Discharge: HOME | End: 2019-01-31
Payer: MEDICARE

## 2019-01-31 PROCEDURE — 97110 THERAPEUTIC EXERCISES: CPT

## 2019-01-31 ASSESSMENT — PAIN DESCRIPTION - LOCATION: LOCATION: ARM;SHOULDER

## 2019-01-31 ASSESSMENT — PAIN SCALES - GENERAL: PAINLEVEL_OUTOF10: 3

## 2019-01-31 ASSESSMENT — PAIN DESCRIPTION - ORIENTATION: ORIENTATION: RIGHT

## 2019-02-04 ENCOUNTER — HOSPITAL ENCOUNTER (OUTPATIENT)
Dept: OCCUPATIONAL THERAPY | Age: 69
Setting detail: THERAPIES SERIES
Discharge: HOME | End: 2019-02-04
Payer: MEDICARE

## 2019-02-04 PROCEDURE — 97110 THERAPEUTIC EXERCISES: CPT

## 2019-02-07 ENCOUNTER — HOSPITAL ENCOUNTER (OUTPATIENT)
Dept: OCCUPATIONAL THERAPY | Age: 69
Setting detail: THERAPIES SERIES
Discharge: HOME | End: 2019-02-07
Payer: MEDICARE

## 2019-02-07 PROCEDURE — 97110 THERAPEUTIC EXERCISES: CPT

## 2019-02-19 ENCOUNTER — OFFICE VISIT (OUTPATIENT)
Dept: CARDIOLOGY CLINIC | Age: 69
End: 2019-02-19
Payer: MEDICARE

## 2019-02-19 VITALS
BODY MASS INDEX: 27.75 KG/M2 | HEIGHT: 63 IN | WEIGHT: 156.6 LBS | DIASTOLIC BLOOD PRESSURE: 80 MMHG | SYSTOLIC BLOOD PRESSURE: 150 MMHG | HEART RATE: 64 BPM

## 2019-02-19 DIAGNOSIS — E78.00 PURE HYPERCHOLESTEROLEMIA: Primary | ICD-10-CM

## 2019-02-19 PROCEDURE — 99213 OFFICE O/P EST LOW 20 MIN: CPT | Performed by: PHYSICIAN ASSISTANT

## 2019-02-22 ENCOUNTER — HOSPITAL ENCOUNTER (OUTPATIENT)
Age: 69
Discharge: HOME | End: 2019-02-22
Payer: MEDICARE

## 2019-02-22 DIAGNOSIS — E78.00 PURE HYPERCHOLESTEROLEMIA: ICD-10-CM

## 2019-02-22 LAB
ALBUMIN SERPL-MCNC: 4.1 G/DL (ref 3.5–5.1)
ALP BLD-CCNC: 55 U/L (ref 38–126)
ALT SERPL-CCNC: 20 U/L (ref 11–66)
AST SERPL-CCNC: 23 U/L (ref 5–40)
BILIRUB SERPL-MCNC: 0.4 MG/DL (ref 0.3–1.2)
BILIRUBIN DIRECT: < 0.2 MG/DL (ref 0–0.3)
CHOLESTEROL, TOTAL: 174 MG/DL (ref 100–199)
HDLC SERPL-MCNC: 60 MG/DL
LDL CHOLESTEROL CALCULATED: 90 MG/DL
TOTAL PROTEIN: 7.1 G/DL (ref 6.1–8)
TRIGL SERPL-MCNC: 122 MG/DL (ref 0–199)

## 2019-02-22 PROCEDURE — 80061 LIPID PANEL: CPT

## 2019-02-22 PROCEDURE — 80076 HEPATIC FUNCTION PANEL: CPT

## 2019-02-22 PROCEDURE — 36415 COLL VENOUS BLD VENIPUNCTURE: CPT

## 2019-03-04 RX ORDER — LOSARTAN POTASSIUM 50 MG/1
TABLET ORAL
Qty: 90 TABLET | Refills: 1 | Status: SHIPPED | OUTPATIENT
Start: 2019-03-04 | End: 2019-09-17 | Stop reason: SDUPTHER

## 2019-03-04 RX ORDER — EZETIMIBE 10 MG/1
TABLET ORAL
Qty: 90 TABLET | Refills: 1 | Status: SHIPPED | OUTPATIENT
Start: 2019-03-04 | End: 2019-09-17 | Stop reason: SDUPTHER

## 2019-03-04 RX ORDER — ROSUVASTATIN CALCIUM 20 MG/1
TABLET, COATED ORAL
Qty: 90 TABLET | Refills: 1 | Status: SHIPPED | OUTPATIENT
Start: 2019-03-04 | End: 2019-09-17 | Stop reason: SDUPTHER

## 2019-07-10 ENCOUNTER — HOSPITAL ENCOUNTER (OUTPATIENT)
Dept: MAMMOGRAPHY | Age: 69
Discharge: HOME | End: 2019-07-10
Payer: MEDICARE

## 2019-07-10 DIAGNOSIS — Z12.39 BREAST CANCER SCREENING: ICD-10-CM

## 2019-07-10 PROCEDURE — 77063 BREAST TOMOSYNTHESIS BI: CPT

## 2019-07-24 RX ORDER — ALENDRONATE SODIUM 70 MG/1
TABLET ORAL
Qty: 12 TABLET | Refills: 2 | Status: SHIPPED | OUTPATIENT
Start: 2019-07-24 | End: 2019-09-16 | Stop reason: SDUPTHER

## 2019-09-05 ENCOUNTER — TELEPHONE (OUTPATIENT)
Dept: FAMILY MEDICINE CLINIC | Age: 69
End: 2019-09-05

## 2019-09-17 ENCOUNTER — OFFICE VISIT (OUTPATIENT)
Dept: FAMILY MEDICINE CLINIC | Age: 69
End: 2019-09-17
Payer: MEDICARE

## 2019-09-17 VITALS
SYSTOLIC BLOOD PRESSURE: 142 MMHG | HEIGHT: 63 IN | DIASTOLIC BLOOD PRESSURE: 78 MMHG | HEART RATE: 66 BPM | WEIGHT: 153 LBS | BODY MASS INDEX: 27.11 KG/M2

## 2019-09-17 DIAGNOSIS — E78.00 PURE HYPERCHOLESTEROLEMIA: ICD-10-CM

## 2019-09-17 DIAGNOSIS — I10 ESSENTIAL HYPERTENSION: Primary | ICD-10-CM

## 2019-09-17 DIAGNOSIS — Z23 NEED FOR VACCINATION FOR STREP PNEUMONIAE: ICD-10-CM

## 2019-09-17 DIAGNOSIS — M81.0 AGE-RELATED OSTEOPOROSIS WITHOUT CURRENT PATHOLOGICAL FRACTURE: ICD-10-CM

## 2019-09-17 PROCEDURE — G0009 ADMIN PNEUMOCOCCAL VACCINE: HCPCS | Performed by: FAMILY MEDICINE

## 2019-09-17 PROCEDURE — 99214 OFFICE O/P EST MOD 30 MIN: CPT | Performed by: FAMILY MEDICINE

## 2019-09-17 PROCEDURE — 90732 PPSV23 VACC 2 YRS+ SUBQ/IM: CPT | Performed by: FAMILY MEDICINE

## 2019-09-17 RX ORDER — LOSARTAN POTASSIUM 50 MG/1
TABLET ORAL
Qty: 90 TABLET | Refills: 1 | Status: SHIPPED | OUTPATIENT
Start: 2019-09-17 | End: 2019-09-17 | Stop reason: SDUPTHER

## 2019-09-17 RX ORDER — ALENDRONATE SODIUM 70 MG/1
TABLET ORAL
Qty: 12 TABLET | Refills: 2 | Status: SHIPPED | OUTPATIENT
Start: 2019-09-17 | End: 2019-09-17 | Stop reason: SDUPTHER

## 2019-09-17 RX ORDER — ROSUVASTATIN CALCIUM 20 MG/1
TABLET, COATED ORAL
Qty: 90 TABLET | Refills: 1 | Status: SHIPPED | OUTPATIENT
Start: 2019-09-17 | End: 2019-09-17 | Stop reason: SDUPTHER

## 2019-09-17 RX ORDER — ROSUVASTATIN CALCIUM 20 MG/1
TABLET, COATED ORAL
Qty: 90 TABLET | Refills: 1 | Status: SHIPPED | OUTPATIENT
Start: 2019-09-17 | End: 2020-03-03 | Stop reason: SDUPTHER

## 2019-09-17 RX ORDER — EZETIMIBE 10 MG/1
TABLET ORAL
Qty: 90 TABLET | Refills: 1 | Status: SHIPPED | OUTPATIENT
Start: 2019-09-17 | End: 2019-09-17 | Stop reason: SDUPTHER

## 2019-09-17 RX ORDER — EZETIMIBE 10 MG/1
TABLET ORAL
Qty: 90 TABLET | Refills: 1 | Status: SHIPPED | OUTPATIENT
Start: 2019-09-17 | End: 2020-03-03 | Stop reason: SDUPTHER

## 2019-09-17 RX ORDER — LOSARTAN POTASSIUM 50 MG/1
TABLET ORAL
Qty: 90 TABLET | Refills: 1 | Status: SHIPPED | OUTPATIENT
Start: 2019-09-17 | End: 2020-03-03 | Stop reason: SDUPTHER

## 2019-09-17 RX ORDER — ALENDRONATE SODIUM 70 MG/1
TABLET ORAL
Qty: 12 TABLET | Refills: 2 | Status: SHIPPED | OUTPATIENT
Start: 2019-09-17 | End: 2020-03-03 | Stop reason: SDUPTHER

## 2019-09-17 ASSESSMENT — ENCOUNTER SYMPTOMS
VOMITING: 0
EYE DISCHARGE: 0
CHEST TIGHTNESS: 0
EYE REDNESS: 0
RHINORRHEA: 0
SHORTNESS OF BREATH: 0
COLOR CHANGE: 0
NAUSEA: 0

## 2019-09-17 ASSESSMENT — PATIENT HEALTH QUESTIONNAIRE - PHQ9
SUM OF ALL RESPONSES TO PHQ9 QUESTIONS 1 & 2: 0
1. LITTLE INTEREST OR PLEASURE IN DOING THINGS: 0
2. FEELING DOWN, DEPRESSED OR HOPELESS: 0
SUM OF ALL RESPONSES TO PHQ QUESTIONS 1-9: 0
SUM OF ALL RESPONSES TO PHQ QUESTIONS 1-9: 0

## 2019-09-17 NOTE — PROGRESS NOTES
[address] [place] [address] [address]  Phone:  [phone]  [facility]:[phone]       Name: Walter High  : 1950    Chief Complaint   Patient presents with    6 Month Follow-Up    Hypertension    Hyperlipidemia       HPI:     Walter High is a 68 y.o. female who presents today for follow-up of hypertension, hyperlipidemia, and osteoporosis. Her lipid profile in 2019 was well-controlled on her current medication. She had right shoulder surgery last October and is recovering well. She is doing well overall and denies any acute issues. She's been traveling to [place] to spend time with her children and grandchildren. Hypertension   This is a chronic problem. The current episode started more than 1 year ago. The problem is unchanged. The problem is controlled. Pertinent negatives include no anxiety, chest pain, headaches, palpitations, peripheral edema or shortness of breath. There are no associated agents to hypertension. Risk factors for coronary artery disease include post-menopausal state. Past treatments include angiotensin blockers. The current treatment provides moderate improvement. Hyperlipidemia   This is a chronic problem. The current episode started more than 1 year ago. The problem is controlled. Recent lipid tests were reviewed and are normal. Associated symptoms include myalgias. Pertinent negatives include no chest pain or shortness of breath. Current antihyperlipidemic treatment includes statins (Zetia.). The current treatment provides moderate improvement of lipids. There are no compliance problems.         Current Outpatient Medications:     alendronate (FOSAMAX) 70 MG tablet, TAKE 1 TABLET EVERY 7 DAYS, Disp: 12 tablet, Rfl: 2    rosuvastatin (CRESTOR) 20 MG tablet, TAKE 1 TABLET DAILY, Disp: 90 tablet, Rfl: 1    losartan (COZAAR) 50 MG tablet, TAKE 1 TABLET DAILY, Disp: 90 tablet, Rfl: 1    ezetimibe (ZETIA) 10 MG tablet,

## 2019-11-25 ENCOUNTER — OFFICE VISIT (OUTPATIENT)
Dept: FAMILY MEDICINE CLINIC | Age: 69
End: 2019-11-25
Payer: MEDICARE

## 2019-11-25 VITALS
BODY MASS INDEX: 27.11 KG/M2 | WEIGHT: 153 LBS | HEIGHT: 63 IN | SYSTOLIC BLOOD PRESSURE: 110 MMHG | DIASTOLIC BLOOD PRESSURE: 74 MMHG

## 2019-11-25 DIAGNOSIS — M25.462 PAIN AND SWELLING OF LEFT KNEE: Primary | ICD-10-CM

## 2019-11-25 DIAGNOSIS — M25.562 PAIN AND SWELLING OF LEFT KNEE: Primary | ICD-10-CM

## 2019-11-25 PROCEDURE — 99213 OFFICE O/P EST LOW 20 MIN: CPT | Performed by: FAMILY MEDICINE

## 2019-11-25 RX ORDER — PREDNISONE 20 MG/1
40 TABLET ORAL DAILY
Qty: 10 TABLET | Refills: 0 | Status: SHIPPED | OUTPATIENT
Start: 2019-11-25 | End: 2020-04-30 | Stop reason: ALTCHOICE

## 2019-11-25 ASSESSMENT — ENCOUNTER SYMPTOMS: COLOR CHANGE: 0

## 2020-01-21 ENCOUNTER — TELEPHONE (OUTPATIENT)
Dept: FAMILY MEDICINE CLINIC | Age: 70
End: 2020-01-21

## 2020-01-21 ENCOUNTER — HOSPITAL ENCOUNTER (OUTPATIENT)
Dept: GENERAL RADIOLOGY | Age: 70
Discharge: HOME | End: 2020-01-21
Payer: MEDICARE

## 2020-01-21 ENCOUNTER — HOSPITAL ENCOUNTER (OUTPATIENT)
Age: 70
Discharge: HOME | End: 2020-01-21
Payer: MEDICARE

## 2020-01-21 ENCOUNTER — OFFICE VISIT (OUTPATIENT)
Dept: FAMILY MEDICINE CLINIC | Age: 70
End: 2020-01-21
Payer: MEDICARE

## 2020-01-21 VITALS
HEIGHT: 63 IN | BODY MASS INDEX: 28.1 KG/M2 | WEIGHT: 158.6 LBS | HEART RATE: 52 BPM | DIASTOLIC BLOOD PRESSURE: 80 MMHG | SYSTOLIC BLOOD PRESSURE: 124 MMHG

## 2020-01-21 PROCEDURE — 73564 X-RAY EXAM KNEE 4 OR MORE: CPT

## 2020-01-21 PROCEDURE — 99213 OFFICE O/P EST LOW 20 MIN: CPT | Performed by: FAMILY MEDICINE

## 2020-01-21 PROCEDURE — G8510 SCR DEP NEG, NO PLAN REQD: HCPCS | Performed by: FAMILY MEDICINE

## 2020-01-21 ASSESSMENT — ENCOUNTER SYMPTOMS
COUGH: 0
COLOR CHANGE: 0
SHORTNESS OF BREATH: 0

## 2020-01-21 ASSESSMENT — PATIENT HEALTH QUESTIONNAIRE - PHQ9
2. FEELING DOWN, DEPRESSED OR HOPELESS: 0
SUM OF ALL RESPONSES TO PHQ9 QUESTIONS 1 & 2: 0
SUM OF ALL RESPONSES TO PHQ QUESTIONS 1-9: 0
SUM OF ALL RESPONSES TO PHQ QUESTIONS 1-9: 0
1. LITTLE INTEREST OR PLEASURE IN DOING THINGS: 0

## 2020-01-21 NOTE — PROGRESS NOTES
[address] [place] [address] [address], [address], [place]  Phone:  [phone]  [facility]:[phone]       Name: Kiran Valdez  : 1950    Chief Complaint   Patient presents with    Knee Pain     lt knee ongoing willing to see ortho dr       HPI:     Kiran Valdez is a 68 y.o. female who presents today for follow-up of left knee pain. The pain began a few months ago with no inciting injury and is progressively worsening. When she was seen in office for knee pain in November, she had an effusion so aspiration was attempted without success. She was treated with oral steroids and the swelling improved mildly, but she continues to have anterior discomfort. The pain is worse when she first gets up in the morning and is slowing her down which she doesn't like as she's usually very active. Current Outpatient Medications:     predniSONE (DELTASONE) 20 MG tablet, Take 2 tablets by mouth daily, Disp: 10 tablet, Rfl: 0    alendronate (FOSAMAX) 70 MG tablet, TAKE 1 TABLET EVERY 7 DAYS, Disp: 12 tablet, Rfl: 2    rosuvastatin (CRESTOR) 20 MG tablet, TAKE 1 TABLET DAILY, Disp: 90 tablet, Rfl: 1    losartan (COZAAR) 50 MG tablet, TAKE 1 TABLET DAILY, Disp: 90 tablet, Rfl: 1    ezetimibe (ZETIA) 10 MG tablet, TAKE 1 TABLET DAILY, Disp: 90 tablet, Rfl: 1    Multiple Vitamins-Minerals (OCUVITE EYE HEATLH GUMMIES PO), Take by mouth, Disp: , Rfl:     Calcium Carb-Cholecalciferol (CALTRATE 600+D3 SOFT PO), Take by mouth, Disp: , Rfl:     Allergies   Allergen Reactions    Demerol Hcl [Meperidine] Other (See Comments)     vomitting       Subjective:      Review of Systems   Respiratory: Negative for cough and shortness of breath. Cardiovascular: Negative for chest pain and palpitations. Genitourinary: Negative for dysuria and frequency. Musculoskeletal: Positive for arthralgias. Negative for gait problem and myalgias. Skin: Negative for color change, pallor and rash.    Neurological: Negative for weakness and numbness. Objective:     /80 (Site: Left Upper Arm, Position: Sitting, Cuff Size: Medium Adult)   Pulse 52   Ht 5' 3\" (1.6 m)   Wt 158 lb 9.6 oz (71.9 kg)   BMI 28.09 kg/m²     Physical Exam  Vitals signs and nursing note reviewed. Constitutional:       General: She is not in acute distress. Appearance: She is well-developed. HENT:      Head: Normocephalic and atraumatic. Nose: Nose normal.   Eyes:      Conjunctiva/sclera: Conjunctivae normal.   Neck:      Musculoskeletal: Normal range of motion and neck supple. Cardiovascular:      Rate and Rhythm: Normal rate and regular rhythm. Heart sounds: Normal heart sounds. Pulmonary:      Effort: Pulmonary effort is normal. No respiratory distress. Breath sounds: Normal breath sounds. No wheezing. Abdominal:      General: Bowel sounds are normal. There is no distension. Palpations: Abdomen is soft. Tenderness: There is no tenderness. Musculoskeletal:      Left knee: She exhibits swelling and effusion. She exhibits normal range of motion, no erythema, no LCL laxity, normal patellar mobility and no MCL laxity. Tenderness found. Medial joint line tenderness noted. Skin:     General: Skin is warm and dry. Neurological:      Mental Status: She is alert and oriented to person, place, and time. Psychiatric:         Behavior: Behavior normal.       Assessment/Plan:     Alina Black was seen today for knee pain. Diagnoses and all orders for this visit:    Pain and swelling of left knee  -     Patient has chronic left knee pain and swelling. Prior aspiration in office was unsuccessful. Will check xrays for further evaluation then refer to Dr. Tarik Todd for further evaluation and treatment. -     XR KNEE LEFT (MIN 4 VIEWS); Future  -     Flower Aranda MD, Orthopedic Surgery, [facility]      Return if symptoms worsen or fail to improve.     Electronically signed by Anahi Mosqueda MD on 1/21/2020 at 11:59 AM

## 2020-01-21 NOTE — TELEPHONE ENCOUNTER
----- Message from Colonel Salvador MD sent at 1/21/2020 12:53 PM EST -----  Knee x-rays revealed no acute process. There are some small bony projections on the medial surface of the knee. Could proceed with MRI or wait until she sees Dr. Raz Saxena.

## 2020-03-03 RX ORDER — ALENDRONATE SODIUM 70 MG/1
TABLET ORAL
Qty: 12 TABLET | Refills: 0 | Status: SHIPPED | OUTPATIENT
Start: 2020-03-03 | End: 2020-07-14

## 2020-03-03 RX ORDER — LOSARTAN POTASSIUM 50 MG/1
TABLET ORAL
Qty: 90 TABLET | Refills: 0 | Status: SHIPPED | OUTPATIENT
Start: 2020-03-03 | End: 2020-06-02

## 2020-03-03 RX ORDER — ROSUVASTATIN CALCIUM 20 MG/1
TABLET, COATED ORAL
Qty: 90 TABLET | Refills: 0 | Status: SHIPPED | OUTPATIENT
Start: 2020-03-03 | End: 2020-06-02

## 2020-03-03 RX ORDER — EZETIMIBE 10 MG/1
TABLET ORAL
Qty: 90 TABLET | Refills: 0 | Status: SHIPPED | OUTPATIENT
Start: 2020-03-03 | End: 2020-06-02

## 2020-03-03 NOTE — TELEPHONE ENCOUNTER
Nemesio Ramirez called requesting a refill on the following medications:  Requested Prescriptions     Pending Prescriptions Disp Refills    alendronate (FOSAMAX) 70 MG tablet 12 tablet 0     Sig: TAKE 1 TABLET EVERY 7 DAYS    rosuvastatin (CRESTOR) 20 MG tablet 90 tablet 0     Sig: TAKE 1 TABLET DAILY    losartan (COZAAR) 50 MG tablet 90 tablet 0     Sig: TAKE 1 TABLET DAILY    ezetimibe (ZETIA) 10 MG tablet 90 tablet 0     Sig: TAKE 1 TABLET DAILY       Date of last visit: 1/21/2020  Date of next visit (if applicable):Visit date not found  Date of last refill: patient needs to make an appointment  [facility]      Thanks,  Jose Paiz LPN

## 2020-04-29 LAB
BUN BLDV-MCNC: NORMAL MG/DL
CALCIUM SERPL-MCNC: NORMAL MG/DL
CHLORIDE BLD-SCNC: NORMAL MMOL/L
CO2: NORMAL
CREAT SERPL-MCNC: NORMAL MG/DL
GFR CALCULATED: NORMAL
GLUCOSE BLD-MCNC: NORMAL MG/DL
POTASSIUM SERPL-SCNC: NORMAL MMOL/L
SODIUM BLD-SCNC: NORMAL MMOL/L

## 2020-04-30 ENCOUNTER — OFFICE VISIT (OUTPATIENT)
Dept: PRIMARY CARE CLINIC | Age: 70
End: 2020-04-30
Payer: MEDICARE

## 2020-04-30 ENCOUNTER — TELEPHONE (OUTPATIENT)
Dept: PRIMARY CARE CLINIC | Age: 70
End: 2020-04-30

## 2020-04-30 VITALS
BODY MASS INDEX: 28.17 KG/M2 | WEIGHT: 159 LBS | HEART RATE: 45 BPM | DIASTOLIC BLOOD PRESSURE: 76 MMHG | SYSTOLIC BLOOD PRESSURE: 160 MMHG | HEIGHT: 63 IN | OXYGEN SATURATION: 97 % | RESPIRATION RATE: 16 BRPM | TEMPERATURE: 98 F

## 2020-04-30 PROCEDURE — 99214 OFFICE O/P EST MOD 30 MIN: CPT | Performed by: FAMILY MEDICINE

## 2020-04-30 ASSESSMENT — ENCOUNTER SYMPTOMS
BLOOD IN STOOL: 0
SHORTNESS OF BREATH: 0
DIARRHEA: 0
NAUSEA: 0
EYES NEGATIVE: 1
ABDOMINAL PAIN: 0
VOMITING: 0

## 2020-04-30 NOTE — TELEPHONE ENCOUNTER
Left message for Dr Jose Gallo office in [address]. Pt is having a knee scope 5/6/2020 @ [facility]. Does she need screened for COVID-19? [facility] has a policy that requires pt's to get screened before procedures.     Pt has had all her pre-op testing done @ [facility] but NO covid-19 test.

## 2020-04-30 NOTE — PROGRESS NOTES
vaccine  Completed    Pneumococcal 65+ years Vaccine  Completed    Hepatitis A vaccine  Aged Out    Hepatitis B vaccine  Aged Out    Hib vaccine  Aged Out    Meningococcal (ACWY) vaccine  Aged Out         ASSESSMENT      1. Tear of medial meniscus of left knee, current, unspecified tear type, subsequent encounter    2. Preop examination    3. Essential hypertension            PLAN       She is medically cleared to proceed with surgery as planned with low surgical risk.     No ASA, NSAIDs or other blood thinners for 7 days prior to her procedure    COVID screening to be done by the facility    Clearance note faxed to the surgeon           Electronically signed by Shane Cranker, MD on 4/30/2020 at 8:47 AM

## 2020-06-02 RX ORDER — EZETIMIBE 10 MG/1
TABLET ORAL
Qty: 90 TABLET | Refills: 3 | Status: SHIPPED | OUTPATIENT
Start: 2020-06-02 | End: 2021-05-27

## 2020-06-02 RX ORDER — LOSARTAN POTASSIUM 50 MG/1
TABLET ORAL
Qty: 90 TABLET | Refills: 3 | Status: SHIPPED | OUTPATIENT
Start: 2020-06-02 | End: 2021-05-27

## 2020-06-02 RX ORDER — ROSUVASTATIN CALCIUM 20 MG/1
TABLET, COATED ORAL
Qty: 90 TABLET | Refills: 3 | Status: SHIPPED | OUTPATIENT
Start: 2020-06-02 | End: 2021-05-27

## 2020-07-14 RX ORDER — ALENDRONATE SODIUM 70 MG/1
TABLET ORAL
Qty: 12 TABLET | Refills: 3 | Status: SHIPPED | OUTPATIENT
Start: 2020-07-14 | End: 2021-04-27 | Stop reason: SINTOL

## 2020-08-12 ENCOUNTER — HOSPITAL ENCOUNTER (OUTPATIENT)
Dept: MAMMOGRAPHY | Age: 70
Discharge: HOME | End: 2020-08-12
Payer: MEDICARE

## 2020-08-12 PROCEDURE — 77063 BREAST TOMOSYNTHESIS BI: CPT

## 2020-09-15 ENCOUNTER — TELEPHONE (OUTPATIENT)
Dept: CARDIOLOGY CLINIC | Age: 70
End: 2020-09-15

## 2020-09-15 NOTE — TELEPHONE ENCOUNTER
Lmovm for pt to call office pt last appt with augutsina was 8-17-18, pt can be seen with any provider now.

## 2020-09-21 ENCOUNTER — TELEPHONE (OUTPATIENT)
Dept: CASE MANAGEMENT | Age: 70
End: 2020-09-21

## 2020-09-21 ASSESSMENT — ENCOUNTER SYMPTOMS
EYE REDNESS: 0
SORE THROAT: 0
COUGH: 0
SHORTNESS OF BREATH: 0
NAUSEA: 0
COLOR CHANGE: 0
VOMITING: 0
EYE DISCHARGE: 0
RHINORRHEA: 0

## 2020-09-21 NOTE — PROGRESS NOTES
[address] [place] [address] Rd, [address]  Phone:  [phone]  Fax:  [phone]    PREOPERATIVE CLEARANCE     Name: Cirilo Willson  : 1950        Chief Complaint:     Chief Complaint   Patient presents with    Pre-op Exam     left total knee replacement    2020   Dr Gneoveva Castellon       History of Present Illness: The patient is presenting today for preoperative clearance for a left total knee arthroplasty with Dr. Genoveva Castellon on 2020. She had a left meniscal repair last year by Dr. Marcie Mix, but continues with pain so sought a second opinion who recommended replacement. Her ELEUTERIO just had hers done so she's been helping to prepare her.     Functional capacity:  > 4 mets (can vacuum/do housework, climb a flight of stairs without dyspnea)?:  Yes    Stress test or cardiac cath within last 5 years?:  No  Any cardiac symptoms?:  Occasional skipped beat after drinking her second cup of coffee  Revascularization in last 5 years?:  No    Prior adverse reaction to anesthesia?:  No  Neck pathology?:  No  Sleep apnea?:  No    Labs reviewed:  Not done      Past Medical History:     Past Medical History:   Diagnosis Date    Hyperlipidemia     Hypertension     Osteopenia         Past Surgical History:     Past Surgical History:   Procedure Laterality Date    BREAST SURGERY      Dr. Camargo Pro; Benign left breast lesion   [facility]      Dr Luis Wynne   last    [address] [place] [address] 2018    Endoveous Ablation        Family History:     Family History   Problem Relation Age of Onset    Colon Cancer Mother     Heart Failure Father     Heart Disease Sister     Heart Attack Brother     Stomach Cancer Paternal Grandfather        Social History:     Social:    Social History     Socioeconomic History    Marital status:      Spouse name: Not on file    Number of children: Not on file    Years of education: Not on file    Highest education level: Not on file   Occupational History    Not on file   Social Needs    Financial resource strain: Not on file    Food insecurity     Worry: Not on file     Inability: Not on file    Transportation needs     Medical: Not on file     Non-medical: Not on file   Tobacco Use    Smoking status: Never Smoker    Smokeless tobacco: Never Used   Substance and Sexual Activity    Alcohol use: Yes     Comment: occasional    Drug use: No    Sexual activity: Not on file   Lifestyle    Physical activity     Days per week: Not on file     Minutes per session: Not on file    Stress: Not on file   Relationships    Social connections     Talks on phone: Not on file     Gets together: Not on file     Attends Spiritism service: Not on file     Active member of club or organization: Not on file     Attends meetings of clubs or organizations: Not on file     Relationship status: Not on file    Intimate partner violence     Fear of current or ex partner: Not on file     Emotionally abused: Not on file     Physically abused: Not on file     Forced sexual activity: Not on file   Other Topics Concern    Not on file   Social History Narrative    Not on file       Allergies:        Demerol hcl [meperidine]      Medications:       Current Outpatient Medications   Medication Sig Dispense Refill    aspirin 81 MG EC tablet Take 81 mg by mouth daily      metoprolol succinate (TOPROL XL) 25 MG extended release tablet Take 1 tablet by mouth daily 90 tablet 1    alendronate (FOSAMAX) 70 MG tablet TAKE 1 TABLET EVERY 7 DAYS 12 tablet 3    losartan (COZAAR) 50 MG tablet TAKE 1 TABLET DAILY 90 tablet 3    ezetimibe (ZETIA) 10 MG tablet TAKE 1 TABLET DAILY (NEED TO SCHEDULE APPOINTMENT) 90 tablet 3    rosuvastatin (CRESTOR) 20 MG tablet TAKE 1 TABLET DAILY 90 tablet 3    Multiple Vitamins-Minerals (CENTRUM MULTI + OMEGA 3 PO) Take by mouth      Calcium Carb-Cholecalciferol (CALTRATE 600+D3 SOFT PO) Take by mouth       No current facility-administered medications for this visit. Review of Systems:      Review of Systems   Constitutional: Negative for chills and fever. HENT: Negative for congestion, rhinorrhea and sore throat. Eyes: Negative for discharge and redness. Respiratory: Negative for cough and shortness of breath. Cardiovascular: Negative for chest pain and palpitations. Gastrointestinal: Negative for nausea and vomiting. Genitourinary: Negative for dysuria and frequency. Musculoskeletal: Positive for arthralgias and myalgias. Skin: Negative for color change and rash. Neurological: Negative for weakness and headaches. Hematological: Negative for adenopathy. Does not bruise/bleed easily. Psychiatric/Behavioral: Negative for decreased concentration and dysphoric mood. Physical Exam:     Vitals:  Blood pressure 128/68, pulse 102, temperature 97.1 °F (36.2 °C), height 5' 3\" (1.6 m), weight 154 lb (69.9 kg), SpO2 98 %, not currently breastfeeding. Physical Exam  Vitals signs and nursing note reviewed. Constitutional:       General: She is not in acute distress. Appearance: She is well-developed. HENT:      Head: Normocephalic and atraumatic. Right Ear: Tympanic membrane, ear canal and external ear normal.      Left Ear: Tympanic membrane, ear canal and external ear normal.      Nose: Nose normal.   Eyes:      Conjunctiva/sclera: Conjunctivae normal.   Neck:      Musculoskeletal: Normal range of motion and neck supple. Cardiovascular:      Rate and Rhythm: Normal rate and regular rhythm. Heart sounds: Normal heart sounds. Pulmonary:      Effort: Pulmonary effort is normal. No respiratory distress. Breath sounds: Normal breath sounds. No wheezing. Abdominal:      General: Bowel sounds are normal. There is no distension. Palpations: Abdomen is soft. Tenderness: There is no abdominal tenderness. Skin:     General: Skin is warm and dry. Findings: No erythema or rash. Neurological:      Mental Status: She is alert and oriented to person, place, and time. Psychiatric:         Behavior: Behavior normal.       Assessment:      Diagnosis Orders   1. Preoperative clearance  Basic Metabolic Panel    CBC With Auto Differential   2. Arthritis of left knee  Basic Metabolic Panel    CBC With Auto Differential       Plan:     Orders Placed This Encounter   Procedures    Basic Metabolic Panel     Standing Status:   Future     Standing Expiration Date:   9/22/2021    CBC With Auto Differential     Standing Status:   Future     Standing Expiration Date:   9/22/2021     The patient will go today and have her labs completed. She's also scheduled for a stress test given her palpitations which are likely PVCs from caffeine. If labs and stress test are unremarkable, she will be considered acceptable risk for the upcoming surgical procedure. I would like to see Casandra Becky back in Return if symptoms worsen or fail to improve. or sooner if any new issues occur.     Electronically signed Vitor Parry MD on 9/22/2020 at 12:14 PM

## 2020-09-21 NOTE — TELEPHONE ENCOUNTER
Name: Kristen Parker  : 1950  MRN: [id number]    Oncology Navigation Follow-Up Note    Contact Type:  Telephone    Notes: Left message for call back re: patient's request for genetic evaluation on Breast Cancer Risk Assessment completed 2020 at [facility] during mammogram visit.     Electronically signed by Gisella Evans RN on 2020 at 12:26 PM

## 2020-09-22 ENCOUNTER — HOSPITAL ENCOUNTER (OUTPATIENT)
Age: 70
Discharge: HOME | End: 2020-09-22
Payer: MEDICARE

## 2020-09-22 ENCOUNTER — OFFICE VISIT (OUTPATIENT)
Dept: FAMILY MEDICINE CLINIC | Age: 70
End: 2020-09-22
Payer: MEDICARE

## 2020-09-22 ENCOUNTER — OFFICE VISIT (OUTPATIENT)
Dept: CARDIOLOGY CLINIC | Age: 70
End: 2020-09-22
Payer: MEDICARE

## 2020-09-22 VITALS
OXYGEN SATURATION: 98 % | TEMPERATURE: 97.1 F | BODY MASS INDEX: 27.29 KG/M2 | HEART RATE: 102 BPM | HEIGHT: 63 IN | WEIGHT: 154 LBS | SYSTOLIC BLOOD PRESSURE: 128 MMHG | DIASTOLIC BLOOD PRESSURE: 68 MMHG

## 2020-09-22 VITALS
BODY MASS INDEX: 27.11 KG/M2 | WEIGHT: 153 LBS | DIASTOLIC BLOOD PRESSURE: 92 MMHG | HEART RATE: 99 BPM | SYSTOLIC BLOOD PRESSURE: 144 MMHG | HEIGHT: 63 IN

## 2020-09-22 DIAGNOSIS — M17.12 ARTHRITIS OF LEFT KNEE: ICD-10-CM

## 2020-09-22 DIAGNOSIS — Z01.818 PREOPERATIVE CLEARANCE: ICD-10-CM

## 2020-09-22 LAB
BASOPHILS # BLD: 0.5 %
BASOPHILS ABSOLUTE: 0 THOU/MM3 (ref 0–0.1)
EOSINOPHIL # BLD: 0.8 %
EOSINOPHILS ABSOLUTE: 0.1 THOU/MM3 (ref 0–0.4)
ERYTHROCYTE [DISTWIDTH] IN BLOOD BY AUTOMATED COUNT: 13.4 % (ref 11.5–14.5)
ERYTHROCYTE [DISTWIDTH] IN BLOOD BY AUTOMATED COUNT: 51.8 FL (ref 35–45)
HCT VFR BLD CALC: 45.1 % (ref 37–47)
HEMOGLOBIN: 14.1 GM/DL (ref 12–16)
IMMATURE GRANS (ABS): 0.04 THOU/MM3 (ref 0–0.07)
IMMATURE GRANULOCYTES: 0.5 %
LYMPHOCYTES # BLD: 20.7 %
LYMPHOCYTES ABSOLUTE: 1.7 THOU/MM3 (ref 1–4.8)
MCH RBC QN AUTO: 32.8 PG (ref 26–33)
MCHC RBC AUTO-ENTMCNC: 31.3 GM/DL (ref 32.2–35.5)
MCV RBC AUTO: 104.9 FL (ref 81–99)
MONOCYTES # BLD: 6.3 %
MONOCYTES ABSOLUTE: 0.5 THOU/MM3 (ref 0.4–1.3)
NUCLEATED RED BLOOD CELLS: 0 /100 WBC
PLATELET # BLD: 234 THOU/MM3 (ref 130–400)
PMV BLD AUTO: 12.5 FL (ref 9.4–12.4)
RBC # BLD: 4.3 MILL/MM3 (ref 4.2–5.4)
SEG NEUTROPHILS: 71.2 %
SEGMENTED NEUTROPHILS ABSOLUTE COUNT: 6 THOU/MM3 (ref 1.8–7.7)
WBC # BLD: 8.4 THOU/MM3 (ref 4.8–10.8)

## 2020-09-22 PROCEDURE — 99213 OFFICE O/P EST LOW 20 MIN: CPT | Performed by: FAMILY MEDICINE

## 2020-09-22 PROCEDURE — 36415 COLL VENOUS BLD VENIPUNCTURE: CPT

## 2020-09-22 PROCEDURE — 93000 ELECTROCARDIOGRAM COMPLETE: CPT | Performed by: NUCLEAR MEDICINE

## 2020-09-22 PROCEDURE — 80048 BASIC METABOLIC PNL TOTAL CA: CPT

## 2020-09-22 PROCEDURE — 99214 OFFICE O/P EST MOD 30 MIN: CPT | Performed by: NUCLEAR MEDICINE

## 2020-09-22 PROCEDURE — 85025 COMPLETE CBC W/AUTO DIFF WBC: CPT

## 2020-09-22 RX ORDER — ASPIRIN 81 MG/1
81 TABLET ORAL DAILY
Status: ON HOLD | COMMUNITY
End: 2021-04-23 | Stop reason: HOSPADM

## 2020-09-22 RX ORDER — METOPROLOL SUCCINATE 25 MG/1
25 TABLET, EXTENDED RELEASE ORAL DAILY
Qty: 90 TABLET | Refills: 1 | Status: SHIPPED | OUTPATIENT
Start: 2020-09-22 | End: 2021-01-25 | Stop reason: SDUPTHER

## 2020-09-22 RX ORDER — METOPROLOL SUCCINATE 25 MG/1
25 TABLET, EXTENDED RELEASE ORAL DAILY
COMMUNITY
End: 2020-09-22 | Stop reason: SDUPTHER

## 2020-09-22 NOTE — PROGRESS NOTES
[address] [facility],[place] [address]  [address]  [facility] [address]  Dept: [phone]  Dept Fax: [phone]  Loc: [phone]    Visit Date: 9/22/2020    Ralph Barry is a 65 y.o. female who presents todayfor:  Chief Complaint   Patient presents with    Cardiac Clearance    Hypertension    Irregular Heart Beat    Hyperlipidemia     Here for pre op clearance   Going for knee surgery   Does have known PVCs  No known CAD before  Yet does have a major risk factors for CAD  Does have some palpitation   Some dyspnea on exertion   No chest pain  BP seems stable   No dizziness  No syncope  Does have known hyperlipidemia  On statins for that   No recent ischemia work up   Here for evaluation     HPI:  HPI  Past Medical History:   Diagnosis Date    Hyperlipidemia     Hypertension     Osteopenia       Past Surgical History:   Procedure Laterality Date   Duane Grier; Benign left breast lesion    COLONOSCOPY      Dr Avelino Murillo   last 8-2017    INGUINAL HERNIA REPAIR      TUBAL LIGATION      VEIN SURGERY Right 08/08/2018    Endoveous Ablation     Family History   Problem Relation Age of Onset    Colon Cancer Mother     Heart Failure Father     Heart Disease Sister     Heart Attack Brother     Stomach Cancer Paternal Grandfather      Social History     Tobacco Use    Smoking status: Never Smoker    Smokeless tobacco: Never Used   Substance Use Topics    Alcohol use: Yes     Comment: occasional      Current Outpatient Medications   Medication Sig Dispense Refill    aspirin 81 MG EC tablet Take 81 mg by mouth daily      alendronate (FOSAMAX) 70 MG tablet TAKE 1 TABLET EVERY 7 DAYS 12 tablet 3    losartan (COZAAR) 50 MG tablet TAKE 1 TABLET DAILY 90 tablet 3    ezetimibe (ZETIA) 10 MG tablet TAKE 1 TABLET DAILY (NEED TO SCHEDULE APPOINTMENT) 90 tablet 3    rosuvastatin (CRESTOR) 20 MG tablet TAKE 1 TABLET DAILY 90 tablet 3    breath) on exertion  EKG 12 lead   2. Pre-op testing  EKG 12 lead   3. PVC (premature ventricular contraction)     4. Essential hypertension     5. Familial hypercholesterolemia     as above  Higher risk patient  Abnormal ECG   ECG in office was done today. I reviewed the ECG. No acute findings, frequent PVCs       Plan:  No follow-ups on file. Discussed  Non invasive cardiac testing will be ordered to further evaluate for any ischemic or structural heart disease as a cause of the patient symptoms. We will proceed with a Stress Cardiolite test and echo soon. Add beta blockers  Continue risk factor modification and medical management  Thank you for allowing me to participate in the care of your patient. Please don't hesitate to contact me regarding any further issues related to the patient care    Orders Placed:  Orders Placed This Encounter   Procedures    EKG 12 lead     Order Specific Question:   Reason for Exam?     Answer: Other       Medications Prescribed:  No orders of the defined types were placed in this encounter. Discussed use, benefit, and side effects of prescribed medications. All patient questions answered. Pt voicedunderstanding. Instructed to continue current medications, diet and exercise. Continue risk factor modification and medical management. Patient agreed with treatment plan. Follow up as directed.     Electronically signedby Ashleigh Simon MD on 9/22/2020 at 8:55 AM

## 2020-09-23 ENCOUNTER — HOSPITAL ENCOUNTER (OUTPATIENT)
Dept: WOMENS IMAGING | Age: 70
Discharge: HOME | End: 2020-09-23
Payer: MEDICARE

## 2020-09-23 LAB
ANION GAP SERPL CALCULATED.3IONS-SCNC: 8 MEQ/L (ref 8–16)
BUN BLDV-MCNC: 19 MG/DL (ref 7–22)
CALCIUM SERPL-MCNC: 9.6 MG/DL (ref 8.5–10.5)
CHLORIDE BLD-SCNC: 100 MEQ/L (ref 98–111)
CO2: 28 MEQ/L (ref 23–33)
CREAT SERPL-MCNC: 0.6 MG/DL (ref 0.4–1.2)
GFR SERPL CREATININE-BSD FRML MDRD: > 90 ML/MIN/1.73M2
GLUCOSE BLD-MCNC: 134 MG/DL (ref 70–108)
POTASSIUM SERPL-SCNC: 4.2 MEQ/L (ref 3.5–5.2)
SODIUM BLD-SCNC: 136 MEQ/L (ref 135–145)

## 2020-09-23 PROCEDURE — 77080 DXA BONE DENSITY AXIAL: CPT

## 2020-09-28 ENCOUNTER — TELEPHONE (OUTPATIENT)
Dept: CARDIOLOGY CLINIC | Age: 70
End: 2020-09-28

## 2020-09-28 ENCOUNTER — HOSPITAL ENCOUNTER (OUTPATIENT)
Dept: NON INVASIVE DIAGNOSTICS | Age: 70
Discharge: HOME | End: 2020-09-28
Payer: MEDICARE

## 2020-09-28 VITALS — HEIGHT: 63 IN | BODY MASS INDEX: 27.29 KG/M2 | WEIGHT: 154 LBS

## 2020-09-28 LAB
LV EF: 55 %
LVEF MODALITY: NORMAL

## 2020-09-28 PROCEDURE — 6360000002 HC RX W HCPCS

## 2020-09-28 PROCEDURE — 3430000000 HC RX DIAGNOSTIC RADIOPHARMACEUTICAL: Performed by: NUCLEAR MEDICINE

## 2020-09-28 PROCEDURE — 93017 CV STRESS TEST TRACING ONLY: CPT | Performed by: NUCLEAR MEDICINE

## 2020-09-28 PROCEDURE — 93306 TTE W/DOPPLER COMPLETE: CPT

## 2020-09-28 PROCEDURE — 78452 HT MUSCLE IMAGE SPECT MULT: CPT

## 2020-09-28 PROCEDURE — A9500 TC99M SESTAMIBI: HCPCS | Performed by: NUCLEAR MEDICINE

## 2020-09-28 RX ADMIN — Medication 35 MILLICURIE: at 09:55

## 2020-09-28 RX ADMIN — Medication 11 MILLICURIE: at 09:10

## 2020-09-28 NOTE — TELEPHONE ENCOUNTER
Patient had stress test and echo done today. Is patient cleared for Left total knee with Dr. Saskia Germain on 9-?

## 2020-09-28 NOTE — TELEPHONE ENCOUNTER
Request for pre op clearance:  Requested by: Dr. Michael Ordoñez  Date of surgery 09/30/2020  Procedure Left Total Knee  Office phone # [phone]  Fax #  [phone]    Is the patient on anti-coag medication? 81 mg aspirin  If yes, how many days does the surgeon want anti-coag medication held: 7 days prior    Date of last visit with cardiologist: 09/22/2020    John Paul Hoffman called requesting clearance for this patient and asked if the results of the EKG could be sent to them as well as any additional testing that was done.   EKG 09/22/2020  Echo 09/28/2020

## 2020-10-05 ENCOUNTER — TELEPHONE (OUTPATIENT)
Dept: CASE MANAGEMENT | Age: 70
End: 2020-10-05

## 2020-10-05 NOTE — TELEPHONE ENCOUNTER
Name: Vance Banerjee  : 1950  MRN: [id number]    Oncology Navigation Follow-Up Note    Contact Type:  Telephone    Notes: Left message for call back re: patient's request for genetic evaluation on Breast Cancer Risk Assessment completed 2020 at [facility] during mammogram visit.     Electronically signed by Corry Palma RN on 10/5/2020 at 3:52 PM

## 2020-10-14 ENCOUNTER — TELEPHONE (OUTPATIENT)
Dept: CASE MANAGEMENT | Age: 70
End: 2020-10-14

## 2020-10-14 NOTE — TELEPHONE ENCOUNTER
Name: Tacho Conroy  : 1950  MRN: [id number]    Oncology Navigation Follow-Up Note    Contact Type:  Telephone-3rd attempt    Notes: Tried to reach patient by phone re: patient's request for genetic evaluation on Breast Cancer Risk Assessment completed 2020 at [facility] during mammogram visit. Letter mailed as final attempt today.     Electronically signed by Melissa Alva RN on 10/14/2020 at 3:52 PM

## 2020-10-21 ENCOUNTER — HOSPITAL ENCOUNTER (OUTPATIENT)
Dept: PHYSICAL THERAPY | Age: 70
Setting detail: THERAPIES SERIES
Discharge: HOME | End: 2020-10-21
Payer: MEDICARE

## 2020-10-21 PROCEDURE — 97110 THERAPEUTIC EXERCISES: CPT

## 2020-10-21 PROCEDURE — 97161 PT EVAL LOW COMPLEX 20 MIN: CPT

## 2020-10-21 NOTE — FLOWSHEET NOTE
** PLEASE SIGN, DATE AND TIME CERTIFICATION BELOW AND RETURN TO [facility] OUTPATIENT REHABILITATION (FAX #: [phone]). ATTEST/CO-SIGN IF ACCESSING VIA [facility]. THANK YOU.**    I certify that I have examined the patient below and determined that Physical Medicine and Rehabilitation service is necessary and that I approve the established plan of care for up to 90 days or as specifically noted. Attestation, signature or co-signature of physician indicates approval of certification requirements.    ________________________ ____________ __________  Physician Signature   Date   Time  [facility]  PHYSICAL THERAPY  [x] EVALUATION  [] DAILY NOTE (LAND) [] DAILY NOTE (AQUATIC ) [] PROGRESS NOTE [] DISCHARGE NOTE    [x] [facility]   [] [place] [address]    [] [facility]   [] [facility]    Date: 10/21/2020  Patient Name:  Norris David  : 1950  MRN: [id number]    Referring Practitioner Karie Sanz DO   Diagnosis Unilateral primary osteoarthritis, left knee [M17.12]    Treatment Diagnosis Post-op left knee pain, decreased left knee AROM, left hip and knee weakness, difficulty ambulating   Date of Evaluation 10/21/20    Additional Pertinent History HTN, irregular heart beat, right total shoulder replacement 10/2019      Functional Outcome Measure Used LEFS   Functional Outcome Score 55/80 (10/21/20)       Insurance: Primary: Payor: [place] /  /  / ,   Secondary:    Authorization Information: Aquatics and modalities covered except ionto, telehealth covered   Visit # 1, 1/10 for progress note   Visits Allowed: No limit   Recertification Date: 1/07/10   Physician Follow-Up: 20   Physician Orders: TKA protocol   History of Present Illness: S/p left TKA 20, had home health PT     SUBJECTIVE: Pt s/p left TKA 20 and had home health PT, discharged last Friday. Pt able to negotiate basement steps.  Pt wants to get on the floor to play with grandchildren- home health worked on this with patient. Pt using SBQC most of time, using walker at night. Pain is controlled with Advil managing pain. Social/Functional History and Current Status:  Medications and Allergies have been reviewed and are listed on Medical History Questionnaire. Jasen Sesay lives with spouse in a single story home with 1-2 stairs and no handrail to enter. Laundry in basement.      Task Previous Current   ADLs  Independent Modified Independent   IADL's Independent Assistance Required   Ambulation Independent Modified Independent   Transfers Independent Modified Independent   Recreation Independent- grandchildren, gardening Modified Independent   Community Integration Independent Modified Independent   Driving Active  Drives with self-imposed restrictions   Work Retired  Retired     OBJECTIVE:    Pain: 1-2/10 left knee, stiffens up with prolonged sitting/driving   Palpation Tender posterolateral knee, mild; good patellar mobility; no tenderness in quad or ITB, knotty ITB noted   Observation Moderate post-op left knee swelling   Posture        Range of Motion Knee left 3-95 deg            Right 0-132 deg   Strength Hip flexion left 3+/5, right 4+/5, sidelying hip ABD left 4-/5, right 4/5, prone extension left 4/5, right 4+/5  Knee right 5/5, left 3+/5  Ankle df 5/5 bilat   Coordination    Sensation intact   Bed Mobility Mod I   Transfers Mod I   Ambulation Lacks left TKE during stance; very mild antalgia, decreased pace; steady   Stairs Ascends reciprocally, descends nonreciprocally with light use of B handrails   Balance See Tinetti- 27/28 (difficulty turning d/t knee)   Special Tests Negative Chace's sign on left         TREATMENT   Precautions: WBAT LLE   Pain: 1-2/10 left knee    X in shaded column indicates activity completed today   Modalities Parameters/  Location  Notes                     Manual Therapy Time/Technique  Notes

## 2020-10-23 ENCOUNTER — HOSPITAL ENCOUNTER (OUTPATIENT)
Dept: PHYSICAL THERAPY | Age: 70
Setting detail: THERAPIES SERIES
Discharge: HOME | End: 2020-10-23
Payer: MEDICARE

## 2020-10-23 PROCEDURE — 97110 THERAPEUTIC EXERCISES: CPT

## 2020-10-23 NOTE — PROGRESS NOTES
[address] [facility]  PHYSICAL THERAPY  [x] DAILY NOTE (LAND) [] DAILY NOTE (AQUATIC ) [] PROGRESS NOTE [] DISCHARGE NOTE    [] [facility]   [x] [address]    [] 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      TREATMENT   Precautions: WBAT LLE   Pain: 3/10 left knee    X in shaded column indicates activity completed today   Modalities Parameters/  Location  Notes         Manual Therapy Time/Technique  Notes         Exercise/Intervention   Notes   Bike seat 3 rocking back/forth 5 minutes  x    Step stretches: left hamstring, knee flexion and calf 3x 15 sec x    Heel Slides with strap  15  x    Quad set on bolster  2x10 5 x    SLR  10 5  x    LAQ  10 5  x    Seated knee flexion with slight over pressure from R leg 10 5 x    Heel/toe raises, 3 way hip and marching  10  x    Rocker Board f/b and s/s  15 taps  30 sec bal x    4 inch step up  10  x    Total gym squats                    Heel raises 15  15  x      Specific Interventions Next Treatment: balance training, step ups, 4 way hip tband    Activity/Treatment Tolerance:  [x]  Patient tolerated treatment well  []  Patient limited by fatigue  []  Patient limited by pain   []  Patient limited by medical complications  []  Other:     Assessment: Progressed with exercises as noted with patient tolerating  well. Patient lacking full extension. Patient reporting pain level less at end of session and not having as much tightness. GOALS:  Patient Goal: Return to normal mobility, gardening, going down to basement   Short Term Goals:  Time Frame: 6 weeks  Patient will improve AROM of left knee from 3-95 degrees to 0-120 degrees to normalize gait pattern. Patient will improve left hip and knee strength to 4+/5 to negotiate stairs reciprocally with minimal to no UE support. Patient will ambulate without AD with left TKE for good heel/toe pattern community distances for appointments, grocery shopping, etc.   Long Term Goals:  Time Frame: 12 weeks  Patient will improve Lower Extremity Functional Scale score from 55/80 to 65/80 to improve functional mobility. Patient will be independent and compliant with HEP daily to achieve above goals. Patient Education:   [x]  HEP/Education Completed:    [employer] Access Code:  []  No new Education completed  []  Reviewed Prior HEP      [x]  Patient verbalized and/or demonstrated understanding of education provided. []  Patient unable to verbalize and/or demonstrate understanding of education provided. Will continue education. []  Barriers to learning:     PLAN:  Treatment Recommendations: Strengthening, Range of Motion, Balance Training, Functional Mobility Training, Gait Training, Stair Training, Manual Therapy - Soft Tissue Mobilization, Home Exercise Program, Patient Education and Modalities   3 times per week for 12 weeks.   [] Continue with current plan of care  []  Modify plan of care as follows:    []  Hold pending physician visit  []  Discharge    Time In 1127   Time Out 1203   Timed Code Minutes: 36 min   Total Treatment Time: 36 min       Electronically Signed by: Laly Adams

## 2020-10-26 ENCOUNTER — HOSPITAL ENCOUNTER (OUTPATIENT)
Dept: PHYSICAL THERAPY | Age: 70
Setting detail: THERAPIES SERIES
Discharge: HOME | End: 2020-10-26
Payer: MEDICARE

## 2020-10-26 PROCEDURE — 97110 THERAPEUTIC EXERCISES: CPT

## 2020-10-26 NOTE — PROGRESS NOTES
knee bend 15  x    Rocker Board f/b and s/s  15 taps  15 sec bal x    4 inch step up  15  x    Total gym squats                    Heel raises 20  20  x      Specific Interventions Next Treatment: balance training, step ups, 4 way hip tband    Activity/Treatment Tolerance:  [x]  Patient tolerated treatment well  []  Patient limited by fatigue  []  Patient limited by pain   []  Patient limited by medical complications  []  Other:     Assessment added prone hang to work on extension, increased reps where noted    GOALS:  Patient Goal: Return to normal mobility, gardening, going down to basement   Short Term Goals:  Time Frame: 6 weeks  Patient will improve AROM of left knee from 3-95 degrees to 0-120 degrees to normalize gait pattern. Patient will improve left hip and knee strength to 4+/5 to negotiate stairs reciprocally with minimal to no UE support. Patient will ambulate without AD with left TKE for good heel/toe pattern community distances for appointments, grocery shopping, etc.   Long Term Goals:  Time Frame: 12 weeks  Patient will improve Lower Extremity Functional Scale score from 55/80 to 65/80 to improve functional mobility. Patient will be independent and compliant with HEP daily to achieve above goals. Patient Education:   [x]  HEP/Education Completed: handout given for prone hang 3 minutes, 2 x per day   [employer] Access Code:  []  No new Education completed  []  Reviewed Prior HEP      [x]  Patient verbalized and/or demonstrated understanding of education provided. []  Patient unable to verbalize and/or demonstrate understanding of education provided. Will continue education. []  Barriers to learning:     PLAN:  Treatment Recommendations: Strengthening, Range of Motion, Balance Training, Functional Mobility Training, Gait Training, Stair Training, Manual Therapy - Soft Tissue Mobilization, Home Exercise Program, Patient Education and Modalities   3 times per week for 12 weeks.   [] Continue with current plan of care  []  Modify plan of care as follows:    []  Hold pending physician visit  []  Discharge    Time In 1300   Time Out 1330   Timed Code Minutes: 30 min   Total Treatment Time: 30 min       Electronically Signed by: Imani Aranda

## 2020-10-28 ENCOUNTER — HOSPITAL ENCOUNTER (OUTPATIENT)
Dept: PHYSICAL THERAPY | Age: 70
Setting detail: THERAPIES SERIES
Discharge: HOME | End: 2020-10-28
Payer: MEDICARE

## 2020-10-28 PROCEDURE — 97110 THERAPEUTIC EXERCISES: CPT

## 2020-10-28 NOTE — PROGRESS NOTES
[address] [facility]  PHYSICAL THERAPY  [x] DAILY NOTE (LAND) [] DAILY NOTE (AQUATIC ) [] PROGRESS NOTE [] DISCHARGE NOTE    [] [address] [facility]   [x] [address]    [] [facility]   [] Pavel Hoover    Date: 10/28/2020  Patient Name:  Mark Mace  : 1950  MRN: [id number]    Referring Practitioner Simón Hammonds,    Diagnosis Unilateral primary osteoarthritis, left knee [M17.12]    Treatment Diagnosis Post-op left knee pain, decreased left knee AROM, left hip and knee weakness, difficulty ambulating   Date of Evaluation 10/21/20    Additional Pertinent History HTN, irregular heart beat, 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     TREATMENT   Precautions: WBAT LLE   Pain: 1/10 left knee    X in shaded column indicates activity completed today   Modalities Parameters/  Location  Notes         Manual Therapy Time/Technique  Notes         Exercise/Intervention   Notes   Bike seat 3 rocking back/forth 5 minutes  x    Step stretches: left hamstring, knee flexion and calf 3x 15 sec x    Heel Slides with strap  20x  x AROM L knee 2 - 105 degrees   Quad set on bolster  2x15 5 x    SLR  2 x 10 5  x    Prone hang 3 minutes  x    Seated knee flexion with slight over pressure from R leg 10 5     Heel/toe raises, small knee bend 15  x    Rocker Board f/b and s/s  15 taps 15 sec bal     4 inch step up  15  x    Total gym squats 20  x      Specific Interventions Next Treatment: balance training, step ups, 4 way hip tband    Activity/Treatment Tolerance:  [x]  Patient tolerated treatment well  []  Patient limited by fatigue  []  Patient limited by pain   []  Patient limited by medical complications  []  Other:     Assessment Added more reps to quad sets to help with knee extension but patient needing cues for less glute activation during. Patient with no complains at end of session. GOALS:  Patient Goal: Return to normal mobility, gardening, going down to basement   Short Term Goals:  Time Frame: 6 weeks  Patient will improve AROM of left knee from 3-95 degrees to 0-120 degrees to normalize gait pattern. Patient will improve left hip and knee strength to 4+/5 to negotiate stairs reciprocally with minimal to no UE support. Patient will ambulate without AD with left TKE for good heel/toe pattern community distances for appointments, grocery shopping, etc.   Long Term Goals:  Time Frame: 12 weeks  Patient will improve Lower Extremity Functional Scale score from 55/80 to 65/80 to improve functional mobility. Patient will be independent and compliant with HEP daily to achieve above goals. Patient Education:   [x]  HEP/Education Completed: handout given for prone hang 3 minutes, 2 x per day   [employer] Access Code:  []  No new Education completed  []  Reviewed Prior HEP      [x]  Patient verbalized and/or demonstrated understanding of education provided. []  Patient unable to verbalize and/or demonstrate understanding of education provided. Will continue education. []  Barriers to learning:     PLAN:  Treatment Recommendations: Strengthening, Range of Motion, Balance Training, Functional Mobility Training, Gait Training, Stair Training, Manual Therapy - Soft Tissue Mobilization, Home Exercise Program, Patient Education and Modalities   3 times per week for 12 weeks.   [] Continue with current plan of care  []  Modify plan of care as follows:    []  Hold pending physician visit  []  Discharge    Time In 1106   Time Out 1140   Timed Code Minutes: 34 min   Total Treatment Time: 34 min       Electronically Signed by: Braden Ladd

## 2020-10-30 ENCOUNTER — HOSPITAL ENCOUNTER (OUTPATIENT)
Dept: PHYSICAL THERAPY | Age: 70
Setting detail: THERAPIES SERIES
Discharge: HOME | End: 2020-10-30
Payer: MEDICARE

## 2020-10-30 PROCEDURE — 97110 THERAPEUTIC EXERCISES: CPT

## 2020-10-30 NOTE — PROGRESS NOTES
[address] [facility]  PHYSICAL THERAPY  [x] DAILY NOTE (LAND) [] DAILY NOTE (AQUATIC ) [] PROGRESS NOTE [] DISCHARGE NOTE    [] [address] [facility]   [x] Leonid Benavides    [] [facility]     Date: 10/30/2020  Patient Name:  Jesenia Jordan  : 1950  MRN: [id number]    Referring Practitioner Jeni Austin DO   Diagnosis Unilateral primary osteoarthritis, left knee [M17.12]    Treatment Diagnosis Post-op left knee pain, decreased left knee AROM, left hip and knee weakness, difficulty ambulating   Date of Evaluation 10/21/20    Additional Pertinent History HTN, irregular heart beat, 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     TREATMENT   Precautions: WBAT LLE   Pain: 1/10 left knee    X in shaded column indicates activity completed today   Modalities Parameters/  Location  Notes         Manual Therapy Time/Technique  Notes         Exercise/Intervention   Notes   Bike seat 3 rocking back/forth 5 minutes  x    Step stretches: left hamstring, knee flexion and calf 3 15 sec x    Heel Slides with strap  25  x AROM L knee 2 - 108 degrees   Quad set on bolster  2x15 5 x    SLR  1 x 10  1x15 5  x    Prone hang 4 minutes  x    Seated knee flexion with slight over pressure from R leg 10 5     Heel/toe raises, small knee bend 15  x    Rocker Board f/b and s/s  15 taps  15 sec bal Modalities    [x]  Continue with current plan of care. 3 times per week for 12 weeks.   []  Modify plan of care as follows:    []  Hold pending physician visit  []  Discharge    Time In 1105   Time Out 1339   Timed Code Minutes: 34 min   Total Treatment Time: 34 min       Electronically Signed by: Filiberto Herzog

## 2020-11-02 ENCOUNTER — HOSPITAL ENCOUNTER (OUTPATIENT)
Dept: PHYSICAL THERAPY | Age: 70
Setting detail: THERAPIES SERIES
Discharge: HOME | End: 2020-11-02
Payer: MEDICARE

## 2020-11-02 PROCEDURE — 97110 THERAPEUTIC EXERCISES: CPT

## 2020-11-02 NOTE — PROGRESS NOTES
[address] [facility]  PHYSICAL THERAPY  [x] DAILY NOTE (LAND) [] DAILY NOTE (AQUATIC ) [] PROGRESS NOTE [] DISCHARGE NOTE    [] [address] [facility]   [x] Leonid     [] [facility]     Date: 2020  Patient Name:  Nikole Llanes  : 1950  MRN: [id number]    Referring Practitioner Jovi Mathew DO   Diagnosis Unilateral primary osteoarthritis, left knee [M17.12]    Treatment Diagnosis Post-op left knee pain, decreased left knee AROM, left hip and knee weakness, difficulty ambulating   Date of Evaluation 10/21/20    Additional Pertinent History HTN, irregular heart beat, 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      TREATMENT   Precautions: WBAT LLE   Pain: 1/10 left knee    X in shaded column indicates activity completed today   Modalities Parameters/  Location  Notes         Manual Therapy Time/Technique  Notes         Exercise/Intervention   Notes   Bike seat 3 rocking back/forth 5 minutes  x Cues to make retro revolution with patient compensating at hip   Step stretches: left hamstring, knee flexion and calf 3 15 sec x    Heel Slides with strap  25  x AROM L knee 1 - 108 degrees   Quad set on bolster  2x15 5 x    SLR  2x15 5  x    Prone hang 4 minutes  x    Seated knee flexion with slight over pressure from R leg 10 5     Heel/toe raises, small knee bend 15  x Rocker Board f/b and s/s  20 taps       4 inch step up  15  x    Total gym squats 20  x      Specific Interventions Next Treatment: Continued to progress as able working on knee range of motion. Activity/Treatment Tolerance:  [x]  Patient tolerated treatment well  []  Patient limited by fatigue  []  Patient limited by pain   []  Patient limited by medical complications  []  Other:     Assessment  Patient still stiff with range of motion and needing encouragement for retro revolutions on bike. GOALS:  Patient Goal: Return to normal mobility, gardening, going down to basement   Short Term Goals:  Time Frame: 6 weeks  Patient will improve AROM of left knee from 3-95 degrees to 0-120 degrees to normalize gait pattern. Patient will improve left hip and knee strength to 4+/5 to negotiate stairs reciprocally with minimal to no UE support. Patient will ambulate without AD with left TKE for good heel/toe pattern community distances for appointments, grocery shopping, etc.   Long Term Goals:  Time Frame: 12 weeks  Patient will improve Lower Extremity Functional Scale score from 55/80 to 65/80 to improve functional mobility. Patient will be independent and compliant with HEP daily to achieve above goals. Patient Education:   [x]  HEP/Education Completed: handout given for prone hang 3 minutes, 2 x per day   [employer] Access Code:  [x]  No new Education completed  []  Reviewed Prior HEP      []  Patient verbalized and/or demonstrated understanding of education provided. []  Patient unable to verbalize and/or demonstrate understanding of education provided. Will continue education. []  Barriers to learning:     PLAN:  Treatment Recommendations: Strengthening, Range of Motion, Balance Training, Functional Mobility Training, Gait Training, Stair Training, Manual Therapy - Soft Tissue Mobilization, Home Exercise Program, Patient Education and Modalities    [x]  Continue with current plan of care.  3 times per

## 2020-11-04 ENCOUNTER — HOSPITAL ENCOUNTER (OUTPATIENT)
Dept: PHYSICAL THERAPY | Age: 70
Setting detail: THERAPIES SERIES
Discharge: HOME | End: 2020-11-04
Payer: MEDICARE

## 2020-11-04 PROCEDURE — 97110 THERAPEUTIC EXERCISES: CPT

## 2020-11-04 NOTE — PROGRESS NOTES
[address] [facility]  PHYSICAL THERAPY  [x] DAILY NOTE (LAND) [] DAILY NOTE (AQUATIC ) [] PROGRESS NOTE [] DISCHARGE NOTE    [] [address] [facility]   [x] Leonid Benavides    [] [facility]     Date: 2020  Patient Name:  Genie Arnold  : 1950  MRN: [id number]    Referring Practitioner Sofía Wu DO   Diagnosis Unilateral primary osteoarthritis, left knee [M17.12]    Treatment Diagnosis Post-op left knee pain, decreased left knee AROM, left hip and knee weakness, difficulty ambulating   Date of Evaluation 10/21/20    Additional Pertinent History HTN, irregular heart beat, 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     TREATMENT   Precautions: WBAT LLE   Pain: 1/10 left knee    X in shaded column indicates activity completed today   Modalities Parameters/  Location  Notes         Manual Therapy Time/Technique  Notes         Exercise/Intervention   Notes   Bike seat 3 rocking back/forth 5 minutes  x Able to make revolutions    Step stretches: left hamstring, knee flexion and calf 3 15 sec x    Heel Slides with strap  25  x AROM L knee 1 - 112 degrees   Quad set on bolster  2x15 5 x    SLR  2x15 5  x    Prone hang with 1# ankle weight 3 minutes  x           Heel/toe raises, small knee bend 15  x    Rocker Board f/b and s/s  20 taps   x    4 inch step up  20  x    Total gym squats 25  x      Specific Interventions Next Treatment: Continued to progress as able working on knee range of motion. Activity/Treatment Tolerance:  [x]  Patient tolerated treatment well  []  Patient limited by fatigue  []  Patient limited by pain   []  Patient limited by medical complications  []  Other:     Assessment  Able to get full revolutions around on bike today and did add 1 pound ankle weight to prone hang with patient tolerating well. GOALS:  Patient Goal: Return to normal mobility, gardening, going down to basement   Short Term Goals:  Time Frame: 6 weeks  Patient will improve AROM of left knee from 3-95 degrees to 0-120 degrees to normalize gait pattern. Patient will improve left hip and knee strength to 4+/5 to negotiate stairs reciprocally with minimal to no UE support. Patient will ambulate without AD with left TKE for good heel/toe pattern community distances for appointments, grocery shopping, etc.   Long Term Goals:  Time Frame: 12 weeks  Patient will improve Lower Extremity Functional Scale score from 55/80 to 65/80 to improve functional mobility. Patient will be independent and compliant with HEP daily to achieve above goals. Patient Education:   []  HEP/Education Completed: handout    [employer] Access Code:  [x]  No new Education completed  []  Reviewed Prior HEP      []  Patient verbalized and/or demonstrated understanding of education provided. []  Patient unable to verbalize and/or demonstrate understanding of education provided. Will continue education. []  Barriers to learning:     PLAN:  Treatment Recommendations: Strengthening, Range of Motion, Balance Training, Functional Mobility Training, Gait Training, Stair Training, Manual Therapy - Soft Tissue Mobilization, Home Exercise Program, Patient Education and Modalities    [x]  Continue with current plan of care. 3 times per week for 12 weeks.   []  Modify plan of care as follows:    []  Hold pending physician visit  []  Discharge    Time In 1034   Time Out 1106   Timed Code Minutes: 32 min   Total Treatment Time: 32 min       Electronically Signed by: Rosie Altamirano

## 2020-11-06 ENCOUNTER — HOSPITAL ENCOUNTER (OUTPATIENT)
Dept: PHYSICAL THERAPY | Age: 70
Setting detail: THERAPIES SERIES
Discharge: HOME | End: 2020-11-06
Payer: MEDICARE

## 2020-11-06 PROCEDURE — 97110 THERAPEUTIC EXERCISES: CPT

## 2020-11-06 NOTE — PROGRESS NOTES
[address] [facility]  PHYSICAL THERAPY  [x] DAILY NOTE (LAND) [] DAILY NOTE (AQUATIC ) [] PROGRESS NOTE [] DISCHARGE NOTE    [] [facility]   [x] Leonid Benavides    [] [facility]     Date: 2020  Patient Name:  Marleny Marte  : 1950  MRN: [id number]    Referring Practitioner Pop Simon DO   Diagnosis Unilateral primary osteoarthritis, left knee [M17.12]    Treatment Diagnosis Post-op left knee pain, decreased left knee AROM, left hip and knee weakness, difficulty ambulating   Date of Evaluation 10/21/20    Additional Pertinent History HTN, irregular heart beat, 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     TREATMENT   Precautions: WBAT LLE   Pain: 1/10 left knee    X in shaded column indicates activity completed today   Modalities Parameters/  Location  Notes         Manual Therapy Time/Technique  Notes         Exercise/Intervention   Notes   Bike seat 3 rocking back/forth 5 minutes  x Able to make revolutions    Step stretches: left hamstring, knee flexion and calf 3 15 sec x    Heel Slides with strap  25  x AROM L knee 1 - 114 degrees   Quad set on bolster  2x15 5 x    SLR  2x15 5  x    Prone hang with 1# ankle weight 3 minutes  x    PROM L knee extension by PT, ankle on bolster 10 5 x    Heel/toe raises, small knee bend 15      Rocker Board f/b and s/s 20 taps       4 inch step up  20      Total gym squats 25  x      Specific Interventions Next Treatment: Continued to progress as able working on knee range of motion. Activity/Treatment Tolerance:  [x]  Patient tolerated treatment well  []  Patient limited by fatigue  []  Patient limited by pain   []  Patient limited by medical complications  []  Other:     Assessment  Still tight with extension so PT added PROM . Advised patient to use weight in bag on ankle for prone hang    GOALS:  Patient Goal: Return to normal mobility, gardening, going down to basement   Short Term Goals:  Time Frame: 6 weeks  Patient will improve AROM of left knee from 3-95 degrees to 0-120 degrees to normalize gait pattern. Patient will improve left hip and knee strength to 4+/5 to negotiate stairs reciprocally with minimal to no UE support. Patient will ambulate without AD with left TKE for good heel/toe pattern community distances for appointments, grocery shopping, etc.   Long Term Goals:  Time Frame: 12 weeks  Patient will improve Lower Extremity Functional Scale score from 55/80 to 65/80 to improve functional mobility. Patient will be independent and compliant with HEP daily to achieve above goals. Patient Education:   []  HEP/Education Completed: reviewed HEP  []  No new Education completed  [x]  Reviewed Prior HEP      []  Patient verbalized and/or demonstrated understanding of education provided. []  Patient unable to verbalize and/or demonstrate understanding of education provided. Will continue education. []  Barriers to learning:     PLAN:  Treatment Recommendations: Strengthening, Range of Motion, Balance Training, Functional Mobility Training, Gait Training, Stair Training, Manual Therapy - Soft Tissue Mobilization, Home Exercise Program, Patient Education and Modalities    [x]  Continue with current plan of care. 3 times per week for 12 weeks.   []  Modify plan of care as follows:    []  Hold pending physician visit  []  Discharge    Time In 1030   Time Out 1100   Timed Code Minutes: 30 min   Total Treatment Time: 30 min       Electronically Signed by: Johnathan Ni

## 2020-11-09 ENCOUNTER — HOSPITAL ENCOUNTER (OUTPATIENT)
Dept: PHYSICAL THERAPY | Age: 70
Setting detail: THERAPIES SERIES
Discharge: HOME | End: 2020-11-09
Payer: MEDICARE

## 2020-11-09 PROCEDURE — 97110 THERAPEUTIC EXERCISES: CPT

## 2020-11-09 NOTE — PROGRESS NOTES
[address] [facility]  PHYSICAL THERAPY  [x] DAILY NOTE (LAND) [] DAILY NOTE (AQUATIC ) [] PROGRESS NOTE [] DISCHARGE NOTE    [] 615 [facility]   [x] Leonid     [] [facility]     Date: 2020  Patient Name:  Lalit De Jesus  : 1950  MRN: [id number]    Referring Practitioner Paul Grijalva DO   Diagnosis Unilateral primary osteoarthritis, left knee [M17.12]    Treatment Diagnosis Post-op left knee pain, decreased left knee AROM, left hip and knee weakness, difficulty ambulating   Date of Evaluation 10/21/20    Additional Pertinent History HTN, irregular heart beat, 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   Working on bending knee with walking    TREATMENT   Precautions: WBAT LLE   Pain: 1/10 left knee    X in shaded column indicates activity completed today   Modalities Parameters/  Location  Notes         Manual Therapy Time/Technique  Notes         Exercise/Intervention   Notes   Bike seat 2 rocking back/forth 5 minutes  x Able to make revolutions    Step stretches: left hamstring, knee flexion and calf 3 15 sec x    Heel Slides with strap  30  x AROM L knee 0 - 116 degrees   Quad set on bolster  2x15 5 x    SLR  2x15 5  x    Prone hang with 2# ankle weight 3 minutes  x    PROM L knee extension by PT, ankle on bolster 10 5 x    Heel/toe raises, small knee bend 20      Rocker Board f/b and s/s  20 taps 4 inch step up  20      Total gym squats 30  x      Specific Interventions Next Treatment: Continued to progress as able working on knee range of motion. Activity/Treatment Tolerance:  [x]  Patient tolerated treatment well  []  Patient limited by fatigue  []  Patient limited by pain   []  Patient limited by medical complications  []  Other:     Assessment   Tight with extension but after PROM able to get full extension    GOALS:  Patient Goal: Return to normal mobility, gardening, going down to basement   Short Term Goals:  Time Frame: 6 weeks  Patient will improve AROM of left knee from 3-95 degrees to 0-120 degrees to normalize gait pattern. Patient will improve left hip and knee strength to 4+/5 to negotiate stairs reciprocally with minimal to no UE support. Patient will ambulate without AD with left TKE for good heel/toe pattern community distances for appointments, grocery shopping, etc.   Long Term Goals:  Time Frame: 12 weeks  Patient will improve Lower Extremity Functional Scale score from 55/80 to 65/80 to improve functional mobility. Patient will be independent and compliant with HEP daily to achieve above goals. Patient Education:   []  HEP/Education Completed: reviewed HEP  []  No new Education completed  [x]  Reviewed Prior HEP      []  Patient verbalized and/or demonstrated understanding of education provided. []  Patient unable to verbalize and/or demonstrate understanding of education provided. Will continue education. []  Barriers to learning:     PLAN:  Treatment Recommendations: Strengthening, Range of Motion, Balance Training, Functional Mobility Training, Gait Training, Stair Training, Manual Therapy - Soft Tissue Mobilization, Home Exercise Program, Patient Education and Modalities    [x]  Continue with current plan of care. 3 times per week for 12 weeks.   []  Modify plan of care as follows:    []  Hold pending physician visit  []  Discharge    Time In 930   Time Out 1000 Timed Code Minutes: 30 min   Total Treatment Time: 30 min       Electronically Signed by: Miguel Allen

## 2020-11-11 ENCOUNTER — HOSPITAL ENCOUNTER (OUTPATIENT)
Dept: PHYSICAL THERAPY | Age: 70
Setting detail: THERAPIES SERIES
Discharge: HOME | End: 2020-11-11
Payer: MEDICARE

## 2020-11-11 PROCEDURE — 97110 THERAPEUTIC EXERCISES: CPT

## 2020-11-11 NOTE — PROGRESS NOTES
[address] [facility]  PHYSICAL THERAPY  [x] DAILY NOTE (LAND) [] DAILY NOTE (AQUATIC ) [] PROGRESS NOTE [] DISCHARGE NOTE    [] 615 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   Increased weight with prone hang    TREATMENT   Precautions: WBAT LLE   Pain: 1/10 left knee    X in shaded column indicates activity completed today   Modalities Parameters/  Location  Notes         Manual Therapy Time/Technique  Notes         Exercise/Intervention   Notes   Bike seat 2 rocking back/forth 5 minutes  x Able to make revolutions    Step stretches: left hamstring, knee flexion and calf 3 15 sec x    Heel Slides with strap  30  x AROM L knee 0 - 116 degrees   Quad set on bolster  2x15 5 x    SLR  2x15 5  x    Prone hang with 2# ankle weight 3 minutes  x    PROM L knee extension by PT, ankle on bolster 10 5     Heel/toe raises, small knee bend 20      Rocker Board f/b and s/s  25 taps       4 inch step up  30      Total gym squats 30  x      Specific Interventions Next Treatment: Continued to progress as able working on knee range of motion. Activity/Treatment Tolerance:  [x]  Patient tolerated treatment well  []  Patient limited by fatigue  []  Patient limited by pain   []  Patient limited by medical complications  []  Other:     Assessment   Able to achieve full extension without PROM today, increased time with increased reps today, progressing well, PN next visit    GOALS:  Patient Goal: Return to normal mobility, gardening, going down to basement   Short Term Goals:  Time Frame: 6 weeks  Patient will improve AROM of left knee from 3-95 degrees to 0-120 degrees to normalize gait pattern. Patient will improve left hip and knee strength to 4+/5 to negotiate stairs reciprocally with minimal to no UE support. Patient will ambulate without AD with left TKE for good heel/toe pattern community distances for appointments, grocery shopping, etc.   Long Term Goals:  Time Frame: 12 weeks  Patient will improve Lower Extremity Functional Scale score from 55/80 to 65/80 to improve functional mobility. Patient will be independent and compliant with HEP daily to achieve above goals. Patient Education:   []  HEP/Education Completed: reviewed HEP  []  No new Education completed  [x]  Reviewed Prior HEP      []  Patient verbalized and/or demonstrated understanding of education provided. []  Patient unable to verbalize and/or demonstrate understanding of education provided. Will continue education. []  Barriers to learning:     PLAN:  Treatment Recommendations: Strengthening, Range of Motion, Balance Training, Functional Mobility Training, Gait Training, Stair Training, Manual Therapy - Soft Tissue Mobilization, Home Exercise Program, Patient Education and Modalities    [x]  Continue with current plan of care. 3 times per week for 12 weeks.   []  Modify plan of care as follows:    []  Hold pending

## 2020-11-12 ENCOUNTER — HOSPITAL ENCOUNTER (OUTPATIENT)
Dept: PHYSICAL THERAPY | Age: 70
Setting detail: THERAPIES SERIES
Discharge: HOME | End: 2020-11-12
Payer: MEDICARE

## 2020-11-12 PROCEDURE — 97110 THERAPEUTIC EXERCISES: CPT

## 2020-11-12 NOTE — PROGRESS NOTES
[address] [facility]  PHYSICAL THERAPY  [] DAILY NOTE (LAND) [] DAILY NOTE (AQUATIC ) [x] PROGRESS NOTE [] DISCHARGE NOTE    [] 615 [facility]   [x] Leonid [address]    [] 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 PN score 63/80      Insurance: Primary: Payor: Joshua Martinez /  /  / ,   Secondary:    Authorization Information: Aquatics and modalities covered except ionto, telehealth covered   Visit # 10, 0/10 for progress note   Visits Allowed: No limit   Recertification Date:    Physician Follow-Up: 20   Physician Orders: TKA protocol   History of Present Illness: S/p left TKA 20, had home health PT     SUBJECTIVE: reports having soreness 1/10.   Increased weight with prone hang, walking at home without cane, outside house with cane    TREATMENT   Precautions: WBAT LLE   Pain: 1/10 left knee    X in shaded column indicates activity completed today   Modalities Parameters/  Location  Notes         Manual Therapy Time/Technique  Notes         Exercise/Intervention   Notes   Bike seat 2 rocking back/forth 5 minutes  x Able to make revolutions    Step stretches: left hamstring, knee flexion and calf 3 15 sec x    Heel Slides with strap  30  x AROM L knee 0 - 118 degrees   Quad set on bolster  2x15 5 x    SLR  2x15 5  x    Prone hang with 2# ankle weight 4 minutes  x    PROM L knee extension by PT, ankle on bolster 10 5 Heel/toe raises, small knee bend 20      Rocker Board f/b and s/s  30 taps       6 inch step up  10      Total gym squats 30  x      Specific Interventions Next Treatment: Continued to progress as able working on knee range of motion. Activity/Treatment Tolerance:  [x]  Patient tolerated treatment well  []  Patient limited by fatigue  []  Patient limited by pain   []  Patient limited by medical complications  []  Other:     Assessment: progressing well with improved flexion and able to get full extension without PROM  GOALS:  Patient Goal: Return to normal mobility, gardening, going down to basement   Short Term Goals:  Time Frame: 6 weeks  Patient will improve AROM of left knee from 3-95 degrees to 0-120 degrees to normalize gait pattern. MODERATE PROGRESS- 0- 118 DEGREES  Patient will improve left hip and knee strength to 4+/5 to negotiate stairs reciprocally with minimal to no UE support. MET KNEE STRENGTH 4+/5, HIP 4/5, GOING UP STEPS RECIPROCALLY, DOWN NON-RECIPROCALLY  Patient will ambulate without AD with left TKE for good heel/toe pattern. MET community distances for appointments, grocery shopping, etc.   MODERATE PROGRESS, CANE WHEN OUTSIDE HOUSE  Long Term Goals:  Time Frame: 12 weeks  Patient will improve Lower Extremity Functional Scale score from 55/80 to 65/80 to improve functional mobility. MODERATE PROGRESS 63/80  Patient will be independent and compliant with HEP daily to achieve above goals. ONGOING    REVISED GOALS:  STG'S - DEFERRED TO LTG'S  LTG'S- 4 WEEKS  1. Increase AROM L knee 0- 122 degrees to allow patient to walk in/out of clinic without cane with normal gait pattern  2. Increase strength L hip 4+/5, knee 5/5 to allow patient to go up and down steps reciprocally with 1 HR  3.   I with HEP as prescribed to allow patient to report able to walk community distance without cane and no LOB      Patient Education:   []  HEP/Education Completed: reviewed HEP  []  No new Education completed  [x]  Reviewed Prior HEP      []  Patient verbalized and/or demonstrated understanding of education provided. []  Patient unable to verbalize and/or demonstrate understanding of education provided. Will continue education. []  Barriers to learning:     PLAN:  Treatment Recommendations: Strengthening, Range of Motion, Balance Training, Functional Mobility Training, Gait Training, Stair Training, Manual Therapy - Soft Tissue Mobilization, Home Exercise Program, Patient Education and Modalities    [x]  Continue with current plan of care. 3 times per week for 4 weeks.   []  Modify plan of care as follows:    []  Hold pending physician visit  []  Discharge    Time In 1430   Time Out 1510   Timed Code Minutes: 40 min   Total Treatment Time: 40 min       Electronically Signed by: Nathan Kingston

## 2020-11-13 ENCOUNTER — APPOINTMENT (OUTPATIENT)
Dept: PHYSICAL THERAPY | Age: 70
End: 2020-11-13
Payer: MEDICARE

## 2020-11-16 ENCOUNTER — HOSPITAL ENCOUNTER (OUTPATIENT)
Dept: PHYSICAL THERAPY | Age: 70
Setting detail: THERAPIES SERIES
Discharge: HOME | End: 2020-11-16
Payer: MEDICARE

## 2020-11-16 PROCEDURE — 97110 THERAPEUTIC EXERCISES: CPT

## 2020-11-16 NOTE — PROGRESS NOTES
[address] [facility]  PHYSICAL THERAPY  [] DAILY NOTE (LAND) [] DAILY NOTE (AQUATIC ) [x] PROGRESS NOTE [] DISCHARGE NOTE    [] 615 [facility]   [x] Leonid Benavides    [] [facility]     Date: 2020  Patient Name:  Nikole Llanes  : 1950  MRN: [id number]    Referring Practitioner Jovi Mahtew DO   Diagnosis Unilateral primary osteoarthritis, left knee [M17.12]    Treatment Diagnosis Post-op left knee pain, decreased left knee AROM, left hip and knee weakness, difficulty ambulating   Date of Evaluation 10/21/20    Additional Pertinent History HTN, irregular heart beat, 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 PN score 63/80      Insurance: Primary: Payor: Amanda Gillette /  /  / ,   Secondary:    Authorization Information: Aquatics and modalities covered except ionto, telehealth covered   Visit # 11, 1/10 for progress note   Visits Allowed: No limit   Recertification Date:    Physician Follow-Up: 20   Physician Orders: TKA protocol   History of Present Illness: S/p left TKA 20, had home health PT     SUBJECTIVE: Pt reporting intermittent soreness in knee but reporting no other complains at this time.     TREATMENT   Precautions: WBAT LLE   Pain: 1/10 left knee    X in shaded column indicates activity completed today   Modalities Parameters/  Location  Notes         Manual Therapy Time/Technique  Notes         Exercise/Intervention   Notes   Bike seat 2 rocking back/forth 5 minutes  x Able to make revolutions    Step stretches: left hamstring, knee flexion and calf 3 15 sec x    Heel Slides with strap  30  x AROM L knee 0 - 118 degrees   Quad set on bolster  2x15 5 x    SLR  2x15 5  x    Prone hang with 2# ankle weight 4 minutes  x    PROM L knee extension by PT, ankle on bolster 10 5     Heel/toe raises, small knee bend 20 Rocker Board f/b and s/s  30 taps       6 inch step up  10  x    Total gym squats  Heel raises  30  15  X  x      Specific Interventions Next Treatment: Continued to progress as able working on knee range of motion. Activity/Treatment Tolerance:  [x]  Patient tolerated treatment well  []  Patient limited by fatigue  []  Patient limited by pain   []  Patient limited by medical complications  []  Other:     Assessment: Continues to progress well with no complains at end of session. GOALS:  Patient Goal: Return to normal mobility, gardening, going down to basement   Short Term Goals:  Time Frame: 6 weeks  Patient will improve AROM of left knee from 3-95 degrees to 0-120 degrees to normalize gait pattern. MODERATE PROGRESS- 0- 118 DEGREES  Patient will improve left hip and knee strength to 4+/5 to negotiate stairs reciprocally with minimal to no UE support. MET KNEE STRENGTH 4+/5, HIP 4/5, GOING UP STEPS RECIPROCALLY, DOWN NON-RECIPROCALLY  Patient will ambulate without AD with left TKE for good heel/toe pattern. MET community distances for appointments, grocery shopping, etc.   MODERATE PROGRESS, CANE WHEN OUTSIDE HOUSE  Long Term Goals:  Time Frame: 12 weeks  Patient will improve Lower Extremity Functional Scale score from 55/80 to 65/80 to improve functional mobility. MODERATE PROGRESS 63/80  Patient will be independent and compliant with HEP daily to achieve above goals. ONGOING    REVISED GOALS:  STG'S - DEFERRED TO LTG'S  LTG'S- 4 WEEKS  1. Increase AROM L knee 0- 122 degrees to allow patient to walk in/out of clinic without cane with normal gait pattern  2. Increase strength L hip 4+/5, knee 5/5 to allow patient to go up and down steps reciprocally with 1 HR  3.   I with HEP as prescribed to allow patient to report able to walk community distance without cane and no LOB      Patient Education:   []  HEP/Education Completed: reviewed HEP  []  No new Education completed  [x]  Reviewed Prior HEP      []  Patient verbalized and/or demonstrated understanding of education provided. []  Patient unable to verbalize and/or demonstrate understanding of education provided. Will continue education. []  Barriers to learning:     PLAN:  Treatment Recommendations: Strengthening, Range of Motion, Balance Training, Functional Mobility Training, Gait Training, Stair Training, Manual Therapy - Soft Tissue Mobilization, Home Exercise Program, Patient Education and Modalities    [x]  Continue with current plan of care. 3 times per week for 4 weeks.   []  Modify plan of care as follows:    []  Hold pending physician visit  []  Discharge    Time In 1332   Time Out 1403   Timed Code Minutes: 31 min   Total Treatment Time: 31 min       Electronically Signed by: Allison Gandara

## 2020-11-18 ENCOUNTER — HOSPITAL ENCOUNTER (OUTPATIENT)
Dept: PHYSICAL THERAPY | Age: 70
Setting detail: THERAPIES SERIES
Discharge: HOME | End: 2020-11-18
Payer: MEDICARE

## 2020-11-18 PROCEDURE — 97110 THERAPEUTIC EXERCISES: CPT

## 2020-11-18 NOTE — PROGRESS NOTES
[address] [facility]  PHYSICAL THERAPY  [x] DAILY NOTE (LAND) [] PROGRESS NOTE [] DISCHARGE NOTE    [] [address] [facility]   [x] [address]    [] [facility]     Date: 2020  Patient Name:  Daphney Booth  : 1950  MRN: [id number]    Referring Practitioner Leesa Barboza DO   Diagnosis Unilateral primary osteoarthritis, left knee [M17.12]    Treatment Diagnosis Post-op left knee pain, decreased left knee AROM, left hip and knee weakness, difficulty ambulating   Date of Evaluation 10/21/20    Additional Pertinent History HTN, irregular heart beat, 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   PN score 63/80      Insurance:3 Primary: Payor: Kay Moreno /  /  / ,   Secondary:    Authorization Information: Aquatics and modalities covered except ionto, telehealth covered   Visit # 13, 2/10 for progress note   Visits Allowed: No limit   Recertification Date:    Physician Follow-Up: 20   Physician Orders: TKA protocol   History of Present Illness: S/p left TKA 20, had home health PT     SUBJECTIVE: Pt currently denies having any pain but reporting that \"I can tell it's there\"    TREATMENT   Precautions: WBAT LLE   Pain: Denies    X in shaded column indicates activity completed today   Modalities Parameters/  Location  Notes         Manual Therapy Time/Technique  Notes         Exercise/Intervention   Notes   Bike seat 2  5 minutes  x Able to make revolutions    Step stretches: left hamstring, knee flexion and calf 3 15 sec x    Heel Slides with strap  30  x AROM L knee 0 - 118 degrees   Quad set on bolster  2x15 5 x    SLR  2x15 5  x    Prone hang with 2# ankle weight 4 minutes  x    PROM L knee extension by PT, ankle on bolster 10 5     Heel/toe raises, small knee bend 20      Rocker Board f/b and s/s  30 taps   x    6 inch step up 10  x    Total gym squats 30    X      Specific Interventions Next Treatment: Continued to progress as able working on knee range of motion. Activity/Treatment Tolerance:  [x]  Patient tolerated treatment well  []  Patient limited by fatigue  []  Patient limited by pain   []  Patient limited by medical complications  []  Other:     Assessment: Added reps to step ups with patient having good tolerance. Patient with no complains at end of session. GOALS:  Patient Goal: Return to normal mobility, gardening, going down to basement   Short Term Goals:  Time Frame: 6 weeks  Patient will improve AROM of left knee from 3-95 degrees to 0-120 degrees to normalize gait pattern. MODERATE PROGRESS- 0- 118 DEGREES  Patient will improve left hip and knee strength to 4+/5 to negotiate stairs reciprocally with minimal to no UE support. MET KNEE STRENGTH 4+/5, HIP 4/5, GOING UP STEPS RECIPROCALLY, DOWN NON-RECIPROCALLY  Patient will ambulate without AD with left TKE for good heel/toe pattern. MET community distances for appointments, grocery shopping, etc.   MODERATE PROGRESS, CANE WHEN OUTSIDE HOUSE  Long Term Goals:  Time Frame: 12 weeks  Patient will improve Lower Extremity Functional Scale score from 55/80 to 65/80 to improve functional mobility. MODERATE PROGRESS 63/80  Patient will be independent and compliant with HEP daily to achieve above goals. ONGOING    REVISED GOALS:  STG'S - DEFERRED TO LTG'S  LTG'S- 4 WEEKS  1. Increase AROM L knee 0- 122 degrees to allow patient to walk in/out of clinic without cane with normal gait pattern  2. Increase strength L hip 4+/5, knee 5/5 to allow patient to go up and down steps reciprocally with 1 HR  3.   I with HEP as prescribed to allow patient to report able to walk community distance without cane and no LOB      Patient Education:   []  HEP/Education Completed: reviewed HEP  []  No new Education completed  [x]  Reviewed Prior HEP      []  Patient verbalized and/or demonstrated understanding of education provided. []  Patient unable to verbalize and/or demonstrate understanding of education provided. Will continue education. []  Barriers to learning:     PLAN:  Treatment Recommendations: Strengthening, Range of Motion, Balance Training, Functional Mobility Training, Gait Training, Stair Training, Manual Therapy - Soft Tissue Mobilization, Home Exercise Program, Patient Education and Modalities    [x]  Continue with current plan of care. 3 times per week for 4 weeks.   []  Modify plan of care as follows:    []  Hold pending physician visit  []  Discharge    Time In 1008   Time Out 1038   Timed Code Minutes: 30 min   Total Treatment Time: 30 min       Electronically Signed by: Bonita Manriquez

## 2020-11-20 ENCOUNTER — HOSPITAL ENCOUNTER (OUTPATIENT)
Dept: PHYSICAL THERAPY | Age: 70
Setting detail: THERAPIES SERIES
Discharge: HOME | End: 2020-11-20
Payer: MEDICARE

## 2020-11-20 PROCEDURE — 97110 THERAPEUTIC EXERCISES: CPT

## 2020-11-20 NOTE — PROGRESS NOTES
[address] [facility]  PHYSICAL THERAPY  [x] DAILY NOTE (LAND) [] PROGRESS NOTE [] DISCHARGE NOTE    [] 615 [facility]   [x] [address]    [] 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 PN score 63/80      Insurance:3 Primary: Payor: Barry Yepez /  /  / ,   Secondary:    Authorization Information: Aquatics and modalities covered except ionto, telehealth covered   Visit # 14, 3/10 for progress note   Visits Allowed: No limit   Recertification Date: 48   Physician Follow-Up: 20   Physician Orders: TKA protocol   History of Present Illness: S/p left TKA 20, had home health PT     SUBJECTIVE: Pt currently denies having any pain and reporting no complains at this time.      TREATMENT   Precautions: WBAT LLE   Pain: Denies    X in shaded column indicates activity completed today   Modalities Parameters/  Location  Notes         Manual Therapy Time/Technique  Notes         Exercise/Intervention   Notes   Bike seat 2  5 minutes  x Able to make revolutions    Step stretches: left hamstring, knee flexion  3 15 sec x    Heel Slides with strap  30  x AROM L knee 0 - 118 degrees   Quad set on bolster  2x15 5 x    SLR  2x15 5  x    Prone hang with 2# ankle weight 4 minutes  x    PROM L knee extension by PT, ankle on bolster 10 5     Heel/toe raises, small knee bend 20      Rocker Board f/b and s/s  30 taps   x    6 inch step up  15  x Total gym squats 30    X      Specific Interventions Next Treatment: Continued to progress as able working on knee range of motion. Activity/Treatment Tolerance:  [x]  Patient tolerated treatment well  []  Patient limited by fatigue  []  Patient limited by pain   []  Patient limited by medical complications  []  Other:     Assessment: Able to move seat down on bike with patient having good tolerance and able to make revolutions. Patient with no complains at end of session. GOALS:  Patient Goal: Return to normal mobility, gardening, going down to basement   Short Term Goals:  Time Frame: 6 weeks  Patient will improve AROM of left knee from 3-95 degrees to 0-120 degrees to normalize gait pattern. MODERATE PROGRESS- 0- 118 DEGREES  Patient will improve left hip and knee strength to 4+/5 to negotiate stairs reciprocally with minimal to no UE support. MET KNEE STRENGTH 4+/5, HIP 4/5, GOING UP STEPS RECIPROCALLY, DOWN NON-RECIPROCALLY  Patient will ambulate without AD with left TKE for good heel/toe pattern. MET community distances for appointments, grocery shopping, etc.   MODERATE PROGRESS, CANE WHEN OUTSIDE HOUSE  Long Term Goals:  Time Frame: 12 weeks  Patient will improve Lower Extremity Functional Scale score from 55/80 to 65/80 to improve functional mobility. MODERATE PROGRESS 63/80  Patient will be independent and compliant with HEP daily to achieve above goals. ONGOING    REVISED GOALS:  STG'S - DEFERRED TO LTG'S  LTG'S- 4 WEEKS  1. Increase AROM L knee 0- 122 degrees to allow patient to walk in/out of clinic without cane with normal gait pattern  2. Increase strength L hip 4+/5, knee 5/5 to allow patient to go up and down steps reciprocally with 1 HR  3.   I with HEP as prescribed to allow patient to report able to walk community distance without cane and no LOB      Patient Education:   []  HEP/Education Completed: reviewed HEP  []  No new Education completed  [x]  Reviewed Prior HEP      []  Patient verbalized and/or demonstrated understanding of education provided. []  Patient unable to verbalize and/or demonstrate understanding of education provided. Will continue education. []  Barriers to learning:     PLAN:  Treatment Recommendations: Strengthening, Range of Motion, Balance Training, Functional Mobility Training, Gait Training, Stair Training, Manual Therapy - Soft Tissue Mobilization, Home Exercise Program, Patient Education and Modalities    [x]  Continue with current plan of care. 3 times per week for 4 weeks.   []  Modify plan of care as follows:    []  Hold pending physician visit  []  Discharge    Time In 1507   Time Out 1538   Timed Code Minutes: 31 min   Total Treatment Time: 31 min       Electronically Signed by: Ashley Dawkins

## 2020-11-23 ENCOUNTER — HOSPITAL ENCOUNTER (OUTPATIENT)
Dept: PHYSICAL THERAPY | Age: 70
Setting detail: THERAPIES SERIES
Discharge: HOME | End: 2020-11-23
Payer: MEDICARE

## 2020-11-23 PROCEDURE — 97110 THERAPEUTIC EXERCISES: CPT

## 2020-11-23 NOTE — PROGRESS NOTES
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 PN score 63/80      Insurance:3 Primary: Payor: Jamir Calzada /  /  / ,   Secondary:    Authorization Information: Aquatics and modalities covered except ionto, telehealth covered   Visit # 15, 4/10 for progress note   Visits Allowed: No limit   Recertification Date:    Physician Follow-Up: 20   Physician Orders: TKA protocol   History of Present Illness: S/p left TKA 20, had home health PT     SUBJECTIVE: patient report to MD tomorrow, L knee is feeling tight today.     TREATMENT   Precautions: WBAT LLE   Pain: Denies    X in shaded column indicates activity completed today   Modalities Parameters/  Location  Notes         Manual Therapy Time/Technique  Notes         Exercise/Intervention   Notes   Bike seat 2  5 minutes  x Able to make revolutions    Step stretches: left hamstring, knee flexion  3 15 sec x    Heel Slides with strap  30  x AROM L knee 0 - 120 degrees   Quad set on bolster  2x15 5 x    SLR  2x15 5  x    Prone hang with 2# ankle weight 4 minutes  x    PROM L knee extension by PT, ankle on bolster 10 5     Heel/toe raises, small knee bend 20      Rocker Board f/b and s/s  30 taps   x    6 inch step up  15  x    Total gym

## 2020-11-25 ENCOUNTER — HOSPITAL ENCOUNTER (OUTPATIENT)
Dept: PHYSICAL THERAPY | Age: 70
Setting detail: THERAPIES SERIES
Discharge: HOME | End: 2020-11-25
Payer: MEDICARE

## 2020-11-25 PROCEDURE — 97110 THERAPEUTIC EXERCISES: CPT

## 2020-11-25 NOTE — PROGRESS NOTES
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 PN score 63/80      Insurance:3 Primary: Payor: Anibal Melo /  /  / ,   Secondary:    Authorization Information: Aquatics and modalities covered except ionto, telehealth covered   Visit # 16, 5/10 for progress note   Visits Allowed: No limit   Recertification Date:    Physician Follow-Up: 20   Physician Orders: TKA protocol   History of Present Illness: S/p left TKA 20, had home health PT     SUBJECTIVE: patient report to MD tomorrow, L knee is feeling tight today.     TREATMENT   Precautions: WBAT LLE   Pain: Denies    X in shaded column indicates activity completed today   Modalities Parameters/  Location  Notes         Manual Therapy Time/Technique  Notes         Exercise/Intervention   Notes   Bike seat 2  5 minutes  x Able to make revolutions    Step stretches: left hamstring, knee flexion  3 15 sec x    Heel Slides with strap  30  x AROM L knee 0 - 120 degrees   Quad set on bolster  2x15 5 x    SLR  2x15 5  x    Prone hang with 2# ankle weight 4 minutes  x    PROM L knee extension by PT, ankle on bolster 10 5     Heel/toe raises, small knee bend 20      Rocker Board f/b and s/s  30 taps   x    6 inch step up  20  x    Total gym squats 30    X      Specific Interventions Next Treatment: Continued to progress as able working on knee range of motion. Activity/Treatment Tolerance:  [x]  Patient tolerated treatment well  []  Patient limited by fatigue  []  Patient limited by pain   []  Patient limited by medical complications  []  Other:     Assessment: added 6 weeks of therapy per new rx. PT will call to see if able to do weight machines      GOALS:  Patient Goal: Return to normal mobility, gardening, going down to basement       REVISED GOALS:  STG'S - DEFERRED TO LTG'S  LTG'S- 4 WEEKS  1. Increase AROM L knee 0- 122 degrees to allow patient to walk in/out of clinic without cane with normal gait pattern  2. Increase strength L hip 4+/5, knee 5/5 to allow patient to go up and down steps reciprocally with 1 HR  3. I with HEP as prescribed to allow patient to report able to walk community distance without cane and no LOB      Patient Education:   []  HEP/Education Completed: reviewed HEP  []  No new Education completed  [x]  Reviewed Prior HEP      []  Patient verbalized and/or demonstrated understanding of education provided. []  Patient unable to verbalize and/or demonstrate understanding of education provided. Will continue education. []  Barriers to learning:     PLAN:  Treatment Recommendations: Strengthening, Range of Motion, Balance Training, Functional Mobility Training, Gait Training, Stair Training, Manual Therapy - Soft Tissue Mobilization, Home Exercise Program, Patient Education and Modalities    [x]  Continue with current plan of care. 3 times per week for 4 weeks.   []  Modify plan of care as follows:    []  Hold pending physician visit  []  Discharge    Time In 1000   Time Out 1040   Timed Code Minutes: 40 min   Total Treatment Time: 40 min       Electronically Signed by: Johnathan Ni

## 2020-11-30 ENCOUNTER — HOSPITAL ENCOUNTER (OUTPATIENT)
Dept: PHYSICAL THERAPY | Age: 70
Setting detail: THERAPIES SERIES
Discharge: HOME | End: 2020-11-30
Payer: MEDICARE

## 2020-11-30 PROCEDURE — 97110 THERAPEUTIC EXERCISES: CPT

## 2020-11-30 NOTE — PROGRESS NOTES
[address] [facility]  PHYSICAL THERAPY  [x] DAILY NOTE (LAND) [] PROGRESS NOTE [] DISCHARGE NOTE    [] 615 [facility]   [x] [address]    [] [facility]     Date: 2020  Patient Name:  Yvrose Stoll  : 1950  MRN: [id number]    Referring Practitioner Michelle Pitt DO   Diagnosis Unilateral primary osteoarthritis, left knee [M17.12]    Treatment Diagnosis Post-op left knee pain, decreased left knee AROM, left hip and knee weakness, difficulty ambulating   Date of Evaluation 10/21/20    Additional Pertinent History HTN, irregular heart beat, 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 PN score 63/80      Insurance:3 Primary: Payor: Zoe Abebe /  /  / ,   Secondary:    Authorization Information: Aquatics and modalities covered except ionto, telehealth covered   Visit # 17, 6/10 for progress note   Visits Allowed: No limit   Recertification Date:    Physician Follow-Up: 20   Physician Orders: TKA protocol   History of Present Illness: S/p left TKA 20, had home health PT     SUBJECTIVE: Patient denies having any pain today and reporting no other complains at this time.      TREATMENT   Precautions: WBAT LLE   Pain: Denies    X in shaded column indicates activity completed today   Modalities Parameters/  Location  Notes         Manual Therapy Time/Technique  Notes         Exercise/Intervention   Notes   Bike seat 1 5 minutes  x Able to make revolutions    Step stretches: left hamstring, knee flexion and calf  3 15 sec x    Heel Slides with strap  30  x AROM L knee 0 - 120 degrees   Quad set on bolster  2x15 5 x    SLR  2x15 5  x    Prone hang with 2# ankle weight 4 minutes  x    PROM L knee extension by PT, ankle on bolster 10 5     Heel/toe raises, small knee bend 20      Rocker Board f/b and s/s  30 taps   x    6 inch step up  25  x    Total gym squats  Heel raises  30  20  X  x      Specific Interventions Next Treatment: Continued to progress as able working on knee range of motion. Activity/Treatment Tolerance:  [x]  Patient tolerated treatment well  []  Patient limited by fatigue  []  Patient limited by pain   []  Patient limited by medical complications  []  Other:     Assessment: Added reps to step ups with good tolerance. Have no heard if able to add weights yet so did not add this session. Patient with no complains at end of session. GOALS:  Patient Goal: Return to normal mobility, gardening, going down to basement       REVISED GOALS:  STG'S - DEFERRED TO LTG'S  LTG'S- 4 WEEKS  1. Increase AROM L knee 0- 122 degrees to allow patient to walk in/out of clinic without cane with normal gait pattern  2. Increase strength L hip 4+/5, knee 5/5 to allow patient to go up and down steps reciprocally with 1 HR  3. I with HEP as prescribed to allow patient to report able to walk community distance without cane and no LOB      Patient Education:   []  HEP/Education Completed: reviewed HEP  []  No new Education completed  [x]  Reviewed Prior HEP      []  Patient verbalized and/or demonstrated understanding of education provided. []  Patient unable to verbalize and/or demonstrate understanding of education provided. Will continue education. []  Barriers to learning:     PLAN:  Treatment Recommendations: Strengthening, Range of Motion, Balance Training, Functional Mobility Training, Gait Training, Stair Training, Manual Therapy - Soft Tissue Mobilization, Home Exercise Program, Patient Education and Modalities    [x]  Continue with current plan of care. 3 times per week for 4 weeks.   []  Modify plan of care as follows:    []  Hold pending physician visit  []  Discharge    Time In 0859   Time Out 0934   Timed Code Minutes: 35 min   Total Treatment Time: 35 min       Electronically Signed by: Rolando Spencer

## 2020-12-02 ENCOUNTER — HOSPITAL ENCOUNTER (OUTPATIENT)
Dept: PHYSICAL THERAPY | Age: 70
Setting detail: THERAPIES SERIES
Discharge: HOME | End: 2020-12-02
Payer: MEDICARE

## 2020-12-02 PROCEDURE — 97110 THERAPEUTIC EXERCISES: CPT

## 2020-12-02 NOTE — PROGRESS NOTES
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 PN score 63/80      Insurance: Primary: Payor: Anahi Martinez /  /  / ,   Secondary:    Authorization Information: Aquatics and modalities covered except ionto, telehealth covered   Visit # 18, 7/10 for progress note   Visits Allowed: No limit   Recertification Date:    Physician Follow-Up: 20   Physician Orders: TKA protocol   History of Present Illness: S/p left TKA 20, had home health PT     SUBJECTIVE: Patient reporting having to get up a little early today so already did 1 set of HEP and currently reporting knee feeling a little sore.     TREATMENT   Precautions: WBAT LLE   Pain: A little soreness in knee but denies pain    X in shaded column indicates activity completed today   Modalities Parameters/  Location  Notes         Manual Therapy Time/Technique  Notes         Exercise/Intervention   Notes   Bike seat 1 5 minutes  x Able to make revolutions    Step stretches: left hamstring, knee flexion and calf  3 15 sec x    Heel Slides with strap  30  x AROM L knee 0 - 120 degrees   Quad set on bolster  2x15 5 x    SLR  2x15 5  x    Prone hang with 2# ankle weight 4 minutes      Rocker Board f/b and s/s  30 taps   x    6 inch step up  25  x Total gym squats  Heel raises  30  20  X  x      Specific Interventions Next Treatment: Continued to progress as able working on knee range of motion. Activity/Treatment Tolerance:  [x]  Patient tolerated treatment well  []  Patient limited by fatigue  []  Patient limited by pain   []  Patient limited by medical complications  []  Other:     Assessment: Continued with program as noted with patient tolerating well. Patient with no complains at end of session. GOALS:  Patient Goal: Return to normal mobility, gardening, going down to basement       REVISED GOALS:  STG'S - DEFERRED TO LTG'S  LTG'S- 4 WEEKS  1. Increase AROM L knee 0- 122 degrees to allow patient to walk in/out of clinic without cane with normal gait pattern  2. Increase strength L hip 4+/5, knee 5/5 to allow patient to go up and down steps reciprocally with 1 HR  3. I with HEP as prescribed to allow patient to report able to walk community distance without cane and no LOB      Patient Education:   []  HEP/Education Completed: reviewed HEP  []  No new Education completed  [x]  Reviewed Prior HEP      []  Patient verbalized and/or demonstrated understanding of education provided. []  Patient unable to verbalize and/or demonstrate understanding of education provided. Will continue education. []  Barriers to learning:     PLAN:  Treatment Recommendations: Strengthening, Range of Motion, Balance Training, Functional Mobility Training, Gait Training, Stair Training, Manual Therapy - Soft Tissue Mobilization, Home Exercise Program, Patient Education and Modalities    [x]  Continue with current plan of care. 3 times per week for 4 weeks.   []  Modify plan of care as follows:    []  Hold pending physician visit  []  Discharge    Time In 8437   Time Out 0903   Timed Code Minutes: 30 min   Total Treatment Time: 30 min       Electronically Signed by: Rolando Spencer

## 2020-12-04 ENCOUNTER — HOSPITAL ENCOUNTER (OUTPATIENT)
Dept: PHYSICAL THERAPY | Age: 70
Setting detail: THERAPIES SERIES
Discharge: HOME | End: 2020-12-04
Payer: MEDICARE

## 2020-12-04 PROCEDURE — 97110 THERAPEUTIC EXERCISES: CPT

## 2020-12-04 NOTE — PROGRESS NOTES
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 PN score 63/80      Insurance: Primary: Payor: Apurva Coulter /  /  / ,   Secondary:    Authorization Information: Aquatics and modalities covered except ionto, telehealth covered   Visit # 19, 8/10 for progress note   Visits Allowed: No limit   Recertification Date: 68   Physician Follow-Up: 20   Physician Orders: TKA protocol   History of Present Illness: S/p left TKA 20, had home health PT     SUBJECTIVE: PT did hear back from surgeons office and reporting that patient is okay to start weights. Patient currently reporting no complains at this time.      TREATMENT   Precautions: WBAT LLE   Pain: denies    X in shaded column indicates activity completed today   Modalities Parameters/  Location  Notes         Manual Therapy Time/Technique  Notes         Exercise/Intervention   Notes   Bike seat 1 5 minutes  x Able to make revolutions    Step stretches: left hamstring, knee flexion and calf  3 15 sec x    Heel Slides with strap  30  x AROM L knee 0 - 120 degrees   Quad set on bolster  1x15 5 x    SLR  1x15 5  x    Prone hang with 2# ankle weight 4 minutes      Leg Press 20# L LE 10  x    Hamstring curl 20# L LE 10  x    Rocker Board f/b and s/s 30 taps       6 inch step up  25  x    Total gym squats  Heel raises  30  25  X  x      Specific Interventions Next Treatment: Continued to progress as able working on knee range of motion. Progressed with weight machines as able. Activity/Treatment Tolerance:  [x]  Patient tolerated treatment well  []  Patient limited by fatigue   []  Patient limited by pain   []  Patient limited by medical complications  []  Other:     Assessment: Added weighted machine for hamstring curl and leg press with patient reporting feeling fatigued but tolerating well. No complains at end of session. GOALS:  Patient Goal: Return to normal mobility, gardening, going down to basement       REVISED GOALS:  STG'S - DEFERRED TO LTG'S  LTG'S- 4 WEEKS  1. Increase AROM L knee 0- 122 degrees to allow patient to walk in/out of clinic without cane with normal gait pattern  2. Increase strength L hip 4+/5, knee 5/5 to allow patient to go up and down steps reciprocally with 1 HR  3. I with HEP as prescribed to allow patient to report able to walk community distance without cane and no LOB      Patient Education:   []  HEP/Education Completed: reviewed HEP  []  No new Education completed  [x]  Reviewed Prior HEP      []  Patient verbalized and/or demonstrated understanding of education provided. []  Patient unable to verbalize and/or demonstrate understanding of education provided. Will continue education. []  Barriers to learning:     PLAN:  Treatment Recommendations: Strengthening, Range of Motion, Balance Training, Functional Mobility Training, Gait Training, Stair Training, Manual Therapy - Soft Tissue Mobilization, Home Exercise Program, Patient Education and Modalities    [x]  Continue with current plan of care. 3 times per week for 4 weeks.   []  Modify plan of care as follows:    []  Hold pending physician visit  []  Discharge    Time In 1256   Time Out 1328   Timed Code Minutes: 32 min   Total Treatment Time: 32 min Electronically Signed by: Farhat Bustamante

## 2020-12-07 ENCOUNTER — HOSPITAL ENCOUNTER (OUTPATIENT)
Dept: PHYSICAL THERAPY | Age: 70
Setting detail: THERAPIES SERIES
Discharge: HOME | End: 2020-12-07
Payer: MEDICARE

## 2020-12-07 PROCEDURE — 97110 THERAPEUTIC EXERCISES: CPT

## 2020-12-07 NOTE — PROGRESS NOTES
[address] [facility]  PHYSICAL THERAPY  [x] DAILY NOTE (LAND) [] PROGRESS NOTE [] DISCHARGE NOTE    [] 615 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 PN score 63/80      Insurance: Primary: Payor: Jamir Calzada /  /  / ,   Secondary:    Authorization Information: Aquatics and modalities covered except ionto, telehealth covered   Visit # 20, 9/10 for progress note   Visits Allowed: No limit   Recertification Date:    Physician Follow-Up: 20   Physician Orders: TKA protocol   History of Present Illness: S/p left TKA 20, had home health PT     SUBJECTIVE: Patient reporting no complains at this time.      TREATMENT   Precautions: WBAT LLE   Pain: denies    X in shaded column indicates activity completed today   Modalities Parameters/  Location  Notes         Manual Therapy Time/Technique  Notes         Exercise/Intervention   Notes   Bike seat 1 5 minutes  x Able to make revolutions    Step stretches: left hamstring, knee flexion and calf  3 15 sec x    Heel Slides with strap  30  x AROM L knee 0 - 120 degrees   Quad set on bolster  1x15 5 x    SLR  1x15 5  x    Prone hang with 2# ankle weight 4 minutes      Leg Press 20# L LE 15  x    Hamstring curl 20# L LE 15  x    Rocker Board f/b and s/s  30 taps       6 inch step up  30  x    Total gym squats  Heel raises  30  25  X  x      Specific Interventions Next Treatment: Continued to progress as able working on knee range of motion. Progressed with weight machines as able. Activity/Treatment Tolerance:  [x]  Patient tolerated treatment well  []  Patient limited by fatigue   []  Patient limited by pain   []  Patient limited by medical complications  []  Other:     Assessment: Progressed with reps as noted with patient tolerating well. Patient with no complains at end of session. GOALS:  Patient Goal: Return to normal mobility, gardening, going down to basement       REVISED GOALS:  STG'S - DEFERRED TO LTG'S  LTG'S- 4 WEEKS  1. Increase AROM L knee 0- 122 degrees to allow patient to walk in/out of clinic without cane with normal gait pattern  2. Increase strength L hip 4+/5, knee 5/5 to allow patient to go up and down steps reciprocally with 1 HR  3. I with HEP as prescribed to allow patient to report able to walk community distance without cane and no LOB      Patient Education:   []  HEP/Education Completed: reviewed HEP  [x]  No new Education completed  []  Reviewed Prior HEP      []  Patient verbalized and/or demonstrated understanding of education provided. []  Patient unable to verbalize and/or demonstrate understanding of education provided. Will continue education. []  Barriers to learning:     PLAN:  Treatment Recommendations: Strengthening, Range of Motion, Balance Training, Functional Mobility Training, Gait Training, Stair Training, Manual Therapy - Soft Tissue Mobilization, Home Exercise Program, Patient Education and Modalities    [x]  Continue with current plan of care. 3 times per week for 4 weeks.   []  Modify plan of care as follows:    []  Hold pending physician visit  []  Discharge    Time In 3854   Time Out 0909   Timed Code Minutes: 32 min   Total Treatment Time: 32 min       Electronically Signed by: Imtiaz Sarmiento

## 2020-12-09 ENCOUNTER — HOSPITAL ENCOUNTER (OUTPATIENT)
Dept: PHYSICAL THERAPY | Age: 70
Setting detail: THERAPIES SERIES
Discharge: HOME | End: 2020-12-09
Payer: MEDICARE

## 2020-12-09 PROCEDURE — 97110 THERAPEUTIC EXERCISES: CPT

## 2020-12-09 NOTE — PROGRESS NOTES
[address] [facility]  PHYSICAL THERAPY  [x] DAILY NOTE (LAND) [] PROGRESS NOTE [] DISCHARGE NOTE    [] 615 [facility]   [x] [address]    [] [facility]     Date: 2020  Patient Name:  Ari Green  : 1950  MRN: [id number]    Referring Practitioner Thomas De La Rosa DO   Diagnosis Unilateral primary osteoarthritis, left knee [M17.12]    Treatment Diagnosis Post-op left knee pain, decreased left knee AROM, left hip and knee weakness, difficulty ambulating   Date of Evaluation 10/21/20    Additional Pertinent History HTN, irregular heart beat, 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 PN score 63/80      Insurance: Primary: Payor: Oumou Monsivais /  /  / ,   Secondary:    Authorization Information: Aquatics and modalities covered except ionto, telehealth covered   Visit # 21, 10/10 for progress note   Visits Allowed: No limit   Recertification Date:    Physician Follow-Up: 20   Physician Orders: TKA protocol   History of Present Illness: S/p left TKA 20, had home health PT     SUBJECTIVE: Patient reporting no pain but stating that she does feel a little tired following therapy sessions.      TREATMENT   Precautions: WBAT LLE   Pain: denies    X in shaded column indicates activity completed today   Modalities Parameters/  Location  Notes         Manual Therapy Time/Technique  Notes         Exercise/Intervention   Notes   Bike seat 1 5 minutes  x Able to make revolutions    Step stretches: left hamstring, knee flexion and calf  3 15 sec x    Heel Slides with strap  30  x AROM L knee 0 - 122 degrees   Quad set on bolster  1x15 5 x    SLR  1x15 5  x    Prone hang with 2# ankle weight 4 minutes      Leg Press 20# L LE 2x10  x    Hamstring curl 20# L LE 2x10  x    Rocker Board f/b and s/s  30 taps       6 inch step up  30  x Total gym squats  Heel raises   Single leg squat  30  30  15  X  X  x      Specific Interventions Next Treatment: Continued to progress as able working on knee range of motion. Progressed with weight machines as able. Activity/Treatment Tolerance:  [x]  Patient tolerated treatment well  []  Patient limited by fatigue   []  Patient limited by pain   []  Patient limited by medical complications  []  Other:     Assessment: Added reps to weight machines and added single leg squat with patient tolerating well but again have fatigue at end of session. GOALS:  Patient Goal: Return to normal mobility, gardening, going down to basement       REVISED GOALS:  STG'S - DEFERRED TO LTG'S  LTG'S- 4 WEEKS  1. Increase AROM L knee 0- 122 degrees to allow patient to walk in/out of clinic without cane with normal gait pattern  2. Increase strength L hip 4+/5, knee 5/5 to allow patient to go up and down steps reciprocally with 1 HR  3. I with HEP as prescribed to allow patient to report able to walk community distance without cane and no LOB      Patient Education:   []  HEP/Education Completed: reviewed HEP  [x]  No new Education completed  []  Reviewed Prior HEP      []  Patient verbalized and/or demonstrated understanding of education provided. []  Patient unable to verbalize and/or demonstrate understanding of education provided. Will continue education. []  Barriers to learning:     PLAN:  Treatment Recommendations: Strengthening, Range of Motion, Balance Training, Functional Mobility Training, Gait Training, Stair Training, Manual Therapy - Soft Tissue Mobilization, Home Exercise Program, Patient Education and Modalities    [x]  Continue with current plan of care. 3 times per week for 4 weeks.   []  Modify plan of care as follows:    []  Hold pending physician visit  []  Discharge    Time In 0933   Time Out 1005   Timed Code Minutes: 32 min   Total Treatment Time: 32 min       Electronically Signed by: Rich Mckeon

## 2020-12-11 ENCOUNTER — HOSPITAL ENCOUNTER (OUTPATIENT)
Dept: PHYSICAL THERAPY | Age: 70
Setting detail: THERAPIES SERIES
Discharge: HOME | End: 2020-12-11
Payer: MEDICARE

## 2020-12-11 PROCEDURE — 97110 THERAPEUTIC EXERCISES: CPT

## 2020-12-11 NOTE — PROGRESS NOTES
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 PN score 63/80      Insurance: Primary: Payor: Anibal Melo /  /  / ,   Secondary:    Authorization Information: Aquatics and modalities covered except ionto, telehealth covered   Visit # 22, 11/10 for progress note   Visits Allowed: No limit   Recertification Date:    Physician Follow-Up: 20   Physician Orders: TKA protocol   History of Present Illness: S/p left TKA 20, had home health PT     SUBJECTIVE: Patient reporting having a little more soreness following last session and reporting knee is still and little more sore today.     TREATMENT   Precautions: WBAT LLE   Pain: soreness    X in shaded column indicates activity completed today   Modalities Parameters/  Location  Notes         Manual Therapy Time/Technique  Notes         Exercise/Intervention   Notes   Bike seat 1 5 minutes  x Able to make revolutions    Step stretches: left hamstring, knee flexion and calf  3 15 sec x    Heel Slides with strap  30  x AROM L knee 0 - 120 degrees   Quad set on bolster  1x15 5 x    SLR  1x15 5  x    Prone hang with 2# ankle weight 4 minutes      Leg Press 20# L LE 2x10  x    Hamstring curl 20# L LE 2x10  x    Rocker Board f/b and s/s  30 taps 6 inch step up  30  x    Total gym squats  Heel raises   Single leg squat  30  30  15  X          Specific Interventions Next Treatment: Continued to progress as able working on knee range of motion. Progressed with weight machines as able. Activity/Treatment Tolerance:  [x]  Patient tolerated treatment well  []  Patient limited by fatigue   []  Patient limited by pain   []  Patient limited by medical complications  []  Other:     Assessment: No progressions made this date due to patient reporting having more soreness following last session. Patient with no complains at end of session. May try and add weighted knee extension next session. GOALS:  Patient Goal: Return to normal mobility, gardening, going down to basement       REVISED GOALS:  STG'S - DEFERRED TO LTG'S  LTG'S- 4 WEEKS  1. Increase AROM L knee 0- 122 degrees to allow patient to walk in/out of clinic without cane with normal gait pattern  2. Increase strength L hip 4+/5, knee 5/5 to allow patient to go up and down steps reciprocally with 1 HR  3. I with HEP as prescribed to allow patient to report able to walk community distance without cane and no LOB      Patient Education:   []  HEP/Education Completed: reviewed HEP  [x]  No new Education completed  []  Reviewed Prior HEP      []  Patient verbalized and/or demonstrated understanding of education provided. []  Patient unable to verbalize and/or demonstrate understanding of education provided. Will continue education. []  Barriers to learning:     PLAN:  Treatment Recommendations: Strengthening, Range of Motion, Balance Training, Functional Mobility Training, Gait Training, Stair Training, Manual Therapy - Soft Tissue Mobilization, Home Exercise Program, Patient Education and Modalities    [x]  Continue with current plan of care. 3 times per week for 4 weeks.   []  Modify plan of care as follows:    []  Hold pending physician visit  []  Discharge    Time In [phone]   Time Out 7368   Timed Code

## 2020-12-14 ENCOUNTER — HOSPITAL ENCOUNTER (OUTPATIENT)
Dept: PHYSICAL THERAPY | Age: 70
Setting detail: THERAPIES SERIES
Discharge: HOME | End: 2020-12-14
Payer: MEDICARE

## 2020-12-14 PROCEDURE — 97110 THERAPEUTIC EXERCISES: CPT

## 2020-12-14 NOTE — PROGRESS NOTES
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 PN score 63/80      Insurance: Primary: Payor: Bobby Bello /  /  / ,   Secondary:    Authorization Information: Aquatics and modalities covered except ionto, telehealth covered   Visit # 23, 12/10 for progress note   Visits Allowed: No limit   Recertification Date:    Physician Follow-Up: 20   Physician Orders: TKA protocol   History of Present Illness: S/p left TKA 20, had home health PT     SUBJECTIVE: Patient reporting knee feeling tight today but reporting no other complains a this time.      TREATMENT   Precautions: WBAT LLE   Pain: soreness    X in shaded column indicates activity completed today   Modalities Parameters/  Location  Notes         Manual Therapy Time/Technique  Notes         Exercise/Intervention   Notes   Bike seat 1 5 minutes  x Able to make revolutions    Step stretches: left hamstring, knee flexion and calf  3 15 sec x    Heel Slides with strap  30  x AROM L knee 0 - 120 degrees   Quad set on bolster  1x15 5 x    SLR  1x15 5  x    Leg Extension 10# 10  x    Leg Press 20# L LE 2x10  x    Hamstring curl 20# L LE 2x10  x    Rocker Board f/b and s/s  30 taps   x    6 inch step up  30  x    Total gym squats  Heel raises   Single leg squat  30  30  15  X  x  x      Specific Interventions Next Treatment: Continued to progress as able working on knee range of motion. Progressed with weight machines as able. Activity/Treatment Tolerance:  [x]  Patient tolerated treatment well  []  Patient limited by fatigue   []  Patient limited by pain   []  Patient limited by medical complications  []  Other:     Assessment: Added weighted knee extension with patient tolerating well. Patient with no complains at end of session. GOALS:  Patient Goal: Return to normal mobility, gardening, going down to basement       REVISED GOALS:  STG'S - DEFERRED TO LTG'S  LTG'S- 4 WEEKS  1. Increase AROM L knee 0- 122 degrees to allow patient to walk in/out of clinic without cane with normal gait pattern  2. Increase strength L hip 4+/5, knee 5/5 to allow patient to go up and down steps reciprocally with 1 HR  3. I with HEP as prescribed to allow patient to report able to walk community distance without cane and no LOB      Patient Education:   []  HEP/Education Completed: reviewed HEP  [x]  No new Education completed  []  Reviewed Prior HEP      []  Patient verbalized and/or demonstrated understanding of education provided. []  Patient unable to verbalize and/or demonstrate understanding of education provided. Will continue education. []  Barriers to learning:     PLAN:  Treatment Recommendations: Strengthening, Range of Motion, Balance Training, Functional Mobility Training, Gait Training, Stair Training, Manual Therapy - Soft Tissue Mobilization, Home Exercise Program, Patient Education and Modalities    [x]  Continue with current plan of care. 3 times per week for 4 weeks.   []  Modify plan of care as follows:    []  Hold pending physician visit  []  Discharge    Time In 0850   Time Out 0925   Timed Code Minutes: 35 min   Total Treatment Time: 35 min       Electronically Signed by: Alexa Topete

## 2020-12-15 ASSESSMENT — ENCOUNTER SYMPTOMS
RHINORRHEA: 0
EYE DISCHARGE: 0
VOMITING: 0
COUGH: 0
SORE THROAT: 0
SHORTNESS OF BREATH: 0
NAUSEA: 0
EYE REDNESS: 0
COLOR CHANGE: 0

## 2020-12-15 NOTE — PROGRESS NOTES
[address] [place] [address] [address], [address]  Phone:  [phone]  Fax:  [phone]    PREOPERATIVE CLEARANCE     Name: Floresita Hong  : 1950        Chief Complaint:     Chief Complaint   Patient presents with   Dorathy Killer     Dr Bryan Vargas in [place]  2021     seeing Dr Niels Young on 2020       History of Present Illness: The patient is presenting today for preoperative clearance for a left shoulder arthroplasty with Dr. Bryan Vargas in [place] on 2021.     Functional capacity:  > 4 mets (can vacuum/do housework, climb a flight of stairs without dyspnea)?:  Yes    Stress test or cardiac cath within last 5 years?:  Yes, stress test in 2020 negative for ischemia  Any cardiac symptoms?:  No  Revascularization in last 5 years?:  No    Prior adverse reaction to anesthesia?:  No  Neck pathology?:  No  Sleep apnea?:  No    Labs reviewed:  Labs from 2020 were unremarkable      Past Medical History:     Past Medical History:   Diagnosis Date    Hyperlipidemia     Hypertension     Osteopenia         Past Surgical History:     Past Surgical History:   Procedure Laterality Date   Mansi Lorenzana; Benign left breast lesion    COLONOSCOPY      Dr Etienne Gutierrez   last    [facility] 2018    Endoveous Ablation        Family History:     Family History   Problem Relation Age of Onset    Colon Cancer Mother     Heart Failure Father     Heart Disease Sister     Heart Attack Brother     Stomach Cancer Paternal Grandfather        Social History:     Social:    Social History     Socioeconomic History    Marital status:      Spouse name: Not on file    Number of children: Not on file    Years of education: Not on file    Highest education level: Not on file   Occupational History    Not on file   Social Needs    Financial resource strain: Not on file  Food insecurity     Worry: Not on file     Inability: Not on file    Transportation needs     Medical: Not on file     Non-medical: Not on file   Tobacco Use    Smoking status: Never Smoker    Smokeless tobacco: Never Used   Substance and Sexual Activity    Alcohol use: Yes     Comment: occasional    Drug use: No    Sexual activity: Not on file   Lifestyle    Physical activity     Days per week: Not on file     Minutes per session: Not on file    Stress: Not on file   Relationships    Social connections     Talks on phone: Not on file     Gets together: Not on file     Attends Oriental orthodox service: Not on file     Active member of club or organization: Not on file     Attends meetings of clubs or organizations: Not on file     Relationship status: Not on file    Intimate partner violence     Fear of current or ex partner: Not on file     Emotionally abused: Not on file     Physically abused: Not on file     Forced sexual activity: Not on file   Other Topics Concern    Not on file   Social History Narrative    Not on file       Allergies:        Demerol hcl [meperidine]      Medications:       Current Outpatient Medications   Medication Sig Dispense Refill    aspirin 81 MG EC tablet Take 81 mg by mouth daily      metoprolol succinate (TOPROL XL) 25 MG extended release tablet Take 1 tablet by mouth daily 90 tablet 1    alendronate (FOSAMAX) 70 MG tablet TAKE 1 TABLET EVERY 7 DAYS 12 tablet 3    losartan (COZAAR) 50 MG tablet TAKE 1 TABLET DAILY 90 tablet 3    ezetimibe (ZETIA) 10 MG tablet TAKE 1 TABLET DAILY (NEED TO SCHEDULE APPOINTMENT) 90 tablet 3    rosuvastatin (CRESTOR) 20 MG tablet TAKE 1 TABLET DAILY 90 tablet 3    Multiple Vitamins-Minerals (CENTRUM MULTI + OMEGA 3 PO) Take by mouth      Calcium Carb-Cholecalciferol (CALTRATE 600+D3 SOFT PO) Take by mouth       No current facility-administered medications for this visit.         Review of Systems:      Review of Systems Constitutional: Negative for chills and fever. HENT: Negative for congestion, rhinorrhea and sore throat. Eyes: Negative for discharge and redness. Respiratory: Negative for cough and shortness of breath. Cardiovascular: Negative for chest pain and palpitations. Gastrointestinal: Negative for nausea and vomiting. Genitourinary: Negative for dysuria and frequency. Musculoskeletal: Positive for arthralgias and myalgias. Skin: Negative for color change and rash. Neurological: Negative for weakness and headaches. Hematological: Negative for adenopathy. Does not bruise/bleed easily. Psychiatric/Behavioral: Negative for decreased concentration and dysphoric mood. Physical Exam:     Vitals:  Blood pressure 110/60, pulse 72, temperature 97 °F (36.1 °C), height 5' 2\" (1.575 m), weight 152 lb (68.9 kg), SpO2 99 %, not currently breastfeeding. Physical Exam  Vitals signs and nursing note reviewed. Constitutional:       General: She is not in acute distress. Appearance: She is well-developed. HENT:      Head: Normocephalic and atraumatic. Right Ear: Tympanic membrane, ear canal and external ear normal.      Left Ear: Tympanic membrane, ear canal and external ear normal.      Nose: Nose normal.   Eyes:      Conjunctiva/sclera: Conjunctivae normal.   Neck:      Musculoskeletal: Normal range of motion and neck supple. Cardiovascular:      Rate and Rhythm: Normal rate and regular rhythm. Heart sounds: Normal heart sounds. Pulmonary:      Effort: Pulmonary effort is normal. No respiratory distress. Breath sounds: Normal breath sounds. No wheezing. Abdominal:      General: Bowel sounds are normal. There is no distension. Palpations: Abdomen is soft. Tenderness: There is no abdominal tenderness. Skin:     General: Skin is warm and dry. Findings: No erythema or rash. Neurological:      Mental Status: She is alert and oriented to person, place, and time. Psychiatric:         Behavior: Behavior normal.       Assessment:      Diagnosis Orders   1. Preoperative clearance     2. Chronic left shoulder pain         Plan:     The patient is considered acceptable risk for the upcoming surgical procedure. Preoperative forms were completed and will be faxed over today. I would like to see Torsten Byrd back in office as needed.     Electronically signed Bennett Waggoner MD on 12/16/2020 at 10:59 AM

## 2020-12-16 ENCOUNTER — OFFICE VISIT (OUTPATIENT)
Dept: FAMILY MEDICINE CLINIC | Age: 70
End: 2020-12-16
Payer: MEDICARE

## 2020-12-16 ENCOUNTER — HOSPITAL ENCOUNTER (OUTPATIENT)
Dept: PHYSICAL THERAPY | Age: 70
Setting detail: THERAPIES SERIES
Discharge: HOME | End: 2020-12-16
Payer: MEDICARE

## 2020-12-16 VITALS
SYSTOLIC BLOOD PRESSURE: 110 MMHG | DIASTOLIC BLOOD PRESSURE: 60 MMHG | OXYGEN SATURATION: 99 % | WEIGHT: 152 LBS | HEART RATE: 72 BPM | TEMPERATURE: 97 F | BODY MASS INDEX: 27.97 KG/M2 | HEIGHT: 62 IN

## 2020-12-16 PROCEDURE — 99213 OFFICE O/P EST LOW 20 MIN: CPT | Performed by: FAMILY MEDICINE

## 2020-12-16 PROCEDURE — 97110 THERAPEUTIC EXERCISES: CPT

## 2020-12-16 NOTE — PROGRESS NOTES
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 PN score 65/80      Insurance: Primary: Payor: Georgi Trivedi /  /  / ,   Secondary:    Authorization Information: Aquatics and modalities covered except ionto, telehealth covered   Visit # 23, 12/10 for progress note   Visits Allowed: No limit   Recertification Date: 3/76/01   Physician Follow-Up: 20   Physician Orders: TKA protocol   History of Present Illness: S/p left TKA 20, had home health PT     SUBJECTIVE: Patient reporting knee feeling tight today but reporting no other complains a this time.      TREATMENT   Precautions: WBAT LLE   Pain: soreness    X in shaded column indicates activity completed today   Modalities Parameters/  Location  Notes         Manual Therapy Time/Technique  Notes         Exercise/Intervention   Notes   Bike seat 1 5 minutes  x Able to make revolutions    Step stretches: left hamstring, knee flexion and calf  3 15 sec x    Heel Slides with strap  30  x AROM L knee 0 - 121 degrees   Quad set on bolster  1x15 5 x    SLR  1x15 5  x    Leg Extension 10# 2 x 10  x    Leg Press 30# L LE 2x10  x    Hamstring curl 20# L LE 2x10  x    Rocker Board f/b and s/s  30 taps   x    6 inch step up  30  x    Total gym squats Heel raises   Single leg squat  30  30  15  X  x  x      Specific Interventions Next Treatment: Continued to progress as able working on knee range of motion. Progressed with weight machines as able. Activity/Treatment Tolerance:  [x]  Patient tolerated treatment well  []  Patient limited by fatigue   []  Patient limited by pain   []  Patient limited by medical complications  []  Other:     Assessment: increased rep on extension and weight on leg press with no increase in pain. GOALS:  Patient Goal: Return to normal mobility, gardening, going down to basement       REVISED GOALS:  STG'S - DEFERRED TO LTG'S  LTG'S- 4 WEEKS  1. Increase AROM L knee 0- 122 degrees to allow patient to walk in/out of clinic without cane with normal gait pattern. ONGOING  2. Increase strength L hip 4+/5, knee 5/5 to allow patient to go up and down steps reciprocally with 1 HR. MET FOR HIP 4+/5, KNEE 4+/5- ONGOING, ASCENDING RECIPROCALLY, DECENDING NON-RECIPROCALLY  3.  I with HEP as prescribed to allow patient to report able to walk community distance without cane and no LOB. ONGOING WITHOUT PAIN    REVISED GOALS:  STG'S - DEFERRED TO LTG'S  LTG'S- 4 WEEKS  1. Increase AROM L knee 0- 122 degrees to allow patient to walk in/out of clinic without cane with normal gait pattern. 2. Increase strength L , knee 5/5 to allow patient to go up and down steps reciprocally with 1 HR. 3.  I with HEP as prescribed to allow patient to report able to walk community distance without cane and no LOB. Patient Education:   []  HEP/Education Completed: reviewed HEP  [x]  No new Education completed  []  Reviewed Prior HEP      []  Patient verbalized and/or demonstrated understanding of education provided. []  Patient unable to verbalize and/or demonstrate understanding of education provided. Will continue education.   []  Barriers to learning:     PLAN: Treatment Recommendations: Strengthening, Range of Motion, Balance Training, Functional Mobility Training, Gait Training, Stair Training, Manual Therapy - Soft Tissue Mobilization, Home Exercise Program, Patient Education and Modalities    [x]  Continue with current plan of care. 3 times per week for 3 weeks.   []  Modify plan of care as follows:    []  Hold pending physician visit  []  Discharge    Time In 0930   Time Out 1010   Timed Code Minutes: 40 min   Total Treatment Time: 40 min       Electronically Signed by: Luna Clark

## 2020-12-18 ENCOUNTER — HOSPITAL ENCOUNTER (OUTPATIENT)
Dept: PHYSICAL THERAPY | Age: 70
Setting detail: THERAPIES SERIES
Discharge: HOME | End: 2020-12-18
Payer: MEDICARE

## 2020-12-18 PROCEDURE — 97110 THERAPEUTIC EXERCISES: CPT

## 2020-12-18 NOTE — PROGRESS NOTES
[address] [facility]  PHYSICAL THERAPY  [x] DAILY NOTE (LAND) [] PROGRESS NOTE [] DISCHARGE NOTE    [] [address] [facility]   [x] [address]    [] [facility]     Date: 2020  Patient Name:  Daphney Booth  : 1950  MRN: [id number]    Referring Practitioner Leesa Barboza DO   Diagnosis Unilateral primary osteoarthritis, left knee [M17.12]    Treatment Diagnosis Post-op left knee pain, decreased left knee AROM, left hip and knee weakness, difficulty ambulating   Date of Evaluation 10/21/20    Additional Pertinent History HTN, irregular heart beat, 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 PN score 65/80      Insurance: Primary: Payor: Kay Moreno /  /  / ,   Secondary:    Authorization Information: Aquatics and modalities covered except ionto, telehealth covered   Visit # 24, 1/10 for progress note   Visits Allowed: No limit   Recertification Date:    Physician Follow-Up: 20   Physician Orders: TKA protocol   History of Present Illness: S/p left TKA 20, had home health PT     SUBJECTIVE: Patient reporting knee feeling tight today but reporting no other complains a this time.      TREATMENT   Precautions: WBAT LLE   Pain: soreness    X in shaded column indicates activity completed today   Modalities Parameters/  Location  Notes         Manual Therapy Time/Technique  Notes         Exercise/Intervention   Notes   Bike seat 1 5 minutes  x Able to make revolutions    Step stretches: left hamstring, knee flexion and calf  3 15 sec x    Heel Slides with strap  30   AROM L knee 0 - 121 degrees   Quad set on bolster  1x15 5     SLR  1x15 5      Leg Extension 10# 2 x 10  x    Leg Press 30# L LE 2x10  x    Hamstring curl 20# L LE 2x10  x    Rocker Board f/b and s/s  30 taps   x    6 inch step up  30  x    Total gym squats Heel raises   Single leg squat  30  30  15  X        Specific Interventions Next Treatment: Continued to progress as able working on knee range of motion. Progressed with weight machines as able. Activity/Treatment Tolerance:  [x]  Patient tolerated treatment well  []  Patient limited by fatigue   []  Patient limited by pain   []  Patient limited by medical complications  []  Other:     Assessment: Continued with program as noted with patient having no complains at end of session. GOALS:  Patient Goal: Return to normal mobility, gardening, going down to basement       REVISED GOALS:  STG'S - DEFERRED TO LTG'S  LTG'S- 4 WEEKS  1. Increase AROM L knee 0- 122 degrees to allow patient to walk in/out of clinic without cane with normal gait pattern. ONGOING  2. Increase strength L hip 4+/5, knee 5/5 to allow patient to go up and down steps reciprocally with 1 HR. MET FOR HIP 4+/5, KNEE 4+/5- ONGOING, ASCENDING RECIPROCALLY, DECENDING NON-RECIPROCALLY  3.  I with HEP as prescribed to allow patient to report able to walk community distance without cane and no LOB. ONGOING WITHOUT PAIN    REVISED GOALS:  STG'S - DEFERRED TO LTG'S  LTG'S- 4 WEEKS  1. Increase AROM L knee 0- 122 degrees to allow patient to walk in/out of clinic without cane with normal gait pattern. 2. Increase strength L , knee 5/5 to allow patient to go up and down steps reciprocally with 1 HR. 3.  I with HEP as prescribed to allow patient to report able to walk community distance without cane and no LOB. Patient Education:   []  HEP/Education Completed:   [x]  No new Education completed  []  Reviewed Prior HEP      []  Patient verbalized and/or demonstrated understanding of education provided. []  Patient unable to verbalize and/or demonstrate understanding of education provided. Will continue education.   []  Barriers to learning:     PLAN: Treatment Recommendations: Strengthening, Range of Motion, Balance Training, Functional Mobility Training, Gait Training, Stair Training, Manual Therapy - Soft Tissue Mobilization, Home Exercise Program, Patient Education and Modalities    [x]  Continue with current plan of care. 3 times per week for 3 weeks.   []  Modify plan of care as follows:    []  Hold pending physician visit  []  Discharge    Time In 0935   Time Out 1007   Timed Code Minutes: 32 min   Total Treatment Time: 32 min       Electronically Signed by: Lori Wallace

## 2020-12-21 ENCOUNTER — HOSPITAL ENCOUNTER (OUTPATIENT)
Dept: PHYSICAL THERAPY | Age: 70
Setting detail: THERAPIES SERIES
Discharge: HOME | End: 2020-12-21
Payer: MEDICARE

## 2020-12-21 PROCEDURE — 97110 THERAPEUTIC EXERCISES: CPT

## 2020-12-21 NOTE — PROGRESS NOTES
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 PN score 65/80      Insurance: Primary: Payor: Kay Moreno /  /  / ,   Secondary:    Authorization Information: Aquatics and modalities covered except ionto, telehealth covered   Visit # 25, 2/10 for progress note   Visits Allowed: No limit   Recertification Date: 93   Physician Follow-Up: 20   Physician Orders: TKA protocol   History of Present Illness: S/p left TKA 20, had home health PT     SUBJECTIVE: patient reports tightness in knee 2/10.      TREATMENT   Precautions: WBAT LLE   Pain: soreness    X in shaded column indicates activity completed today   Modalities Parameters/  Location  Notes         Manual Therapy Time/Technique  Notes         Exercise/Intervention   Notes   Bike seat 1 5 minutes  x Able to make revolutions    Step stretches: left hamstring, knee flexion and calf  3 15 sec x    Heel Slides with strap  30   AROM L knee 0 - 122 degrees   Quad set on bolster  1x15 5     SLR  1x15 5      Leg Extension 10# 2 x 15  x    Leg Press 30# L LE 2x15  x    Hamstring curl 30# L LE 2x10  x    Rocker Board f/b and s/s  30 taps   x    6 inch step up  30  x    Total gym squats  Heel raises   Single leg squat  30  30  15  X Specific Interventions Next Treatment: Continued to progress as able working on knee range of motion. Progressed with weight machines as able. Activity/Treatment Tolerance:  [x]  Patient tolerated treatment well  []  Patient limited by fatigue   []  Patient limited by pain   []  Patient limited by medical complications  []  Other:     Assessment:  Progressing well with flexion , progressing well, continue per POC. GOALS:  Patient Goal: Return to normal mobility, gardening, going down to basement       REVISED GOALS:  STG'S - DEFERRED TO LTG'S  LTG'S- 4 WEEKS  1. Increase AROM L knee 0- 122 degrees to allow patient to walk in/out of clinic without cane with normal gait pattern. ONGOING  2. Increase strength L hip 4+/5, knee 5/5 to allow patient to go up and down steps reciprocally with 1 HR. MET FOR HIP 4+/5, KNEE 4+/5- ONGOING, ASCENDING RECIPROCALLY, DECENDING NON-RECIPROCALLY  3.  I with HEP as prescribed to allow patient to report able to walk community distance without cane and no LOB. ONGOING WITHOUT PAIN    REVISED GOALS:  STG'S - DEFERRED TO LTG'S  LTG'S- 4 WEEKS  1. Increase AROM L knee 0- 122 degrees to allow patient to walk in/out of clinic without cane with normal gait pattern. 2. Increase strength L , knee 5/5 to allow patient to go up and down steps reciprocally with 1 HR. 3.  I with HEP as prescribed to allow patient to report able to walk community distance without cane and no LOB. Patient Education:   []  HEP/Education Completed:   [x]  No new Education completed  []  Reviewed Prior HEP      []  Patient verbalized and/or demonstrated understanding of education provided. []  Patient unable to verbalize and/or demonstrate understanding of education provided. Will continue education.   []  Barriers to learning:     PLAN: Treatment Recommendations: Strengthening, Range of Motion, Balance Training, Functional Mobility Training, Gait Training, Stair Training, Manual Therapy - Soft Tissue Mobilization, Home Exercise Program, Patient Education and Modalities    [x]  Continue with current plan of care. 3 times per week for 3 weeks.   []  Modify plan of care as follows:    []  Hold pending physician visit  []  Discharge    Time In 1300   Time Out 1330   Timed Code Minutes: 30 min   Total Treatment Time: 30 min       Electronically Signed by: Philip Jaeger

## 2020-12-23 ENCOUNTER — HOSPITAL ENCOUNTER (OUTPATIENT)
Dept: PHYSICAL THERAPY | Age: 70
Setting detail: THERAPIES SERIES
Discharge: HOME | End: 2020-12-23
Payer: MEDICARE

## 2020-12-23 ENCOUNTER — APPOINTMENT (OUTPATIENT)
Dept: PHYSICAL THERAPY | Age: 70
End: 2020-12-23
Payer: MEDICARE

## 2020-12-23 ENCOUNTER — HOSPITAL ENCOUNTER (OUTPATIENT)
Age: 70
Discharge: HOME | End: 2020-12-23
Payer: MEDICARE

## 2020-12-23 LAB
BASOPHILS # BLD: 0.6 %
BASOPHILS ABSOLUTE: 0 THOU/MM3 (ref 0–0.1)
EKG ATRIAL RATE: 75 BPM
EKG P AXIS: 65 DEGREES
EKG P-R INTERVAL: 156 MS
EKG Q-T INTERVAL: 384 MS
EKG QRS DURATION: 72 MS
EKG QTC CALCULATION (BAZETT): 428 MS
EKG R AXIS: 46 DEGREES
EKG T AXIS: 36 DEGREES
EKG VENTRICULAR RATE: 75 BPM
EOSINOPHIL # BLD: 1.2 %
EOSINOPHILS ABSOLUTE: 0.1 THOU/MM3 (ref 0–0.4)
ERYTHROCYTE [DISTWIDTH] IN BLOOD BY AUTOMATED COUNT: 13.4 % (ref 11.5–14.5)
ERYTHROCYTE [DISTWIDTH] IN BLOOD BY AUTOMATED COUNT: 50.7 FL (ref 35–45)
HCT VFR BLD CALC: 41.8 % (ref 37–47)
HEMOGLOBIN: 13.5 GM/DL (ref 12–16)
IMMATURE GRANS (ABS): 0.01 THOU/MM3 (ref 0–0.07)
IMMATURE GRANULOCYTES: 0.2 %
LYMPHOCYTES # BLD: 22.7 %
LYMPHOCYTES ABSOLUTE: 1.1 THOU/MM3 (ref 1–4.8)
MCH RBC QN AUTO: 32.9 PG (ref 26–33)
MCHC RBC AUTO-ENTMCNC: 32.3 GM/DL (ref 32.2–35.5)
MCV RBC AUTO: 102 FL (ref 81–99)
MONOCYTES # BLD: 11 %
MONOCYTES ABSOLUTE: 0.5 THOU/MM3 (ref 0.4–1.3)
NUCLEATED RED BLOOD CELLS: 0 /100 WBC
PLATELET # BLD: 214 THOU/MM3 (ref 130–400)
PMV BLD AUTO: 13 FL (ref 9.4–12.4)
RBC # BLD: 4.1 MILL/MM3 (ref 4.2–5.4)
SEG NEUTROPHILS: 64.3 %
SEGMENTED NEUTROPHILS ABSOLUTE COUNT: 3.1 THOU/MM3 (ref 1.8–7.7)
WBC # BLD: 4.8 THOU/MM3 (ref 4.8–10.8)

## 2020-12-23 PROCEDURE — 93005 ELECTROCARDIOGRAM TRACING: CPT | Performed by: FAMILY MEDICINE

## 2020-12-23 PROCEDURE — 97110 THERAPEUTIC EXERCISES: CPT

## 2020-12-23 PROCEDURE — 93010 ELECTROCARDIOGRAM REPORT: CPT | Performed by: NUCLEAR MEDICINE

## 2020-12-23 PROCEDURE — 85025 COMPLETE CBC W/AUTO DIFF WBC: CPT

## 2020-12-23 PROCEDURE — 80048 BASIC METABOLIC PNL TOTAL CA: CPT

## 2020-12-23 PROCEDURE — 36415 COLL VENOUS BLD VENIPUNCTURE: CPT

## 2020-12-23 NOTE — PROGRESS NOTES
[address] [facility]  PHYSICAL THERAPY  [x] DAILY NOTE (LAND) [] PROGRESS NOTE [] DISCHARGE NOTE    [] [address] [facility]   [x] [address]    [] [facility]     Date: 2020  Patient Name:  Jesenia Jordan  : 1950  MRN: [id number]    Referring Practitioner Jeni Austin DO   Diagnosis Unilateral primary osteoarthritis, left knee [M17.12]    Treatment Diagnosis Post-op left knee pain, decreased left knee AROM, left hip and knee weakness, difficulty ambulating   Date of Evaluation 10/21/20    Additional Pertinent History HTN, irregular heart beat, 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 PN score 65/80      Insurance: Primary: Payor: Shelly Dumont /  /  / ,   Secondary:    Authorization Information: Aquatics and modalities covered except ionto, telehealth covered   Visit # 26, 3/10 for progress note   Visits Allowed: No limit   Recertification Date:    Physician Follow-Up: 20   Physician Orders: TKA protocol   History of Present Illness: S/p left TKA 20, had home health PT     SUBJECTIVE: Denies pain and reporting no other complains at this time.     TREATMENT   Precautions: WBAT LLE   Pain: soreness    X in shaded column indicates activity completed today   Modalities Parameters/  Location  Notes         Manual Therapy Time/Technique  Notes         Exercise/Intervention   Notes   Bike seat 1 5 minutes  x Able to make revolutions    Step stretches: left hamstring, knee flexion and calf  3 15 sec x    Heel Slides with strap  30   AROM L knee 0 - 122 degrees   Quad set on bolster  1x15 5     SLR  1x15 5      Leg Extension 10# 2 x 15  x    Leg Press 30# L LE 2x15  x    Hamstring curl 30# L LE 2x15  x    Rocker Board f/b and s/s  30 taps   x    6 inch step up  30  x    Airex: tandem stance and narrow stance  1 minute each  x SLS  15 seconds   x With UE support. Only able to go 2-3 seconds without UE support   Total gym squats  Heel raises  30  30  X  X      Specific Interventions Next Treatment: Continued to progress as able working on knee range of motion. Progressed with weight machines as able. Activity/Treatment Tolerance:  [x]  Patient tolerated treatment well  []  Patient limited by fatigue   []  Patient limited by pain   []  Patient limited by medical complications  []  Other:     Assessment:  Added balance exercises with patient tolerating well but having difficulty with single leg balance. GOALS:  Patient Goal: Return to normal mobility, gardening, going down to basement       REVISED GOALS:  STG'S - DEFERRED TO LTG'S  LTG'S- 4 WEEKS  1. Increase AROM L knee 0- 122 degrees to allow patient to walk in/out of clinic without cane with normal gait pattern. ONGOING  2. Increase strength L hip 4+/5, knee 5/5 to allow patient to go up and down steps reciprocally with 1 HR. MET FOR HIP 4+/5, KNEE 4+/5- ONGOING, ASCENDING RECIPROCALLY, DECENDING NON-RECIPROCALLY  3.  I with HEP as prescribed to allow patient to report able to walk community distance without cane and no LOB. ONGOING WITHOUT PAIN    REVISED GOALS:  STG'S - DEFERRED TO LTG'S  LTG'S- 4 WEEKS  1. Increase AROM L knee 0- 122 degrees to allow patient to walk in/out of clinic without cane with normal gait pattern. 2. Increase strength L , knee 5/5 to allow patient to go up and down steps reciprocally with 1 HR. 3.  I with HEP as prescribed to allow patient to report able to walk community distance without cane and no LOB. Patient Education:   []  HEP/Education Completed:   [x]  No new Education completed  []  Reviewed Prior HEP      []  Patient verbalized and/or demonstrated understanding of education provided. []  Patient unable to verbalize and/or demonstrate understanding of education provided. Will continue education.   []  Barriers to learning: PLAN:  Treatment Recommendations: Strengthening, Range of Motion, Balance Training, Functional Mobility Training, Gait Training, Stair Training, Manual Therapy - Soft Tissue Mobilization, Home Exercise Program, Patient Education and Modalities    [x]  Continue with current plan of care. 3 times per week for 3 weeks.   []  Modify plan of care as follows:    []  Hold pending physician visit  []  Discharge    Time In 1259   Time Out 1329   Timed Code Minutes: 30 min   Total Treatment Time: 30 min       Electronically Signed by: Kirill Chandra

## 2020-12-24 PROBLEM — I49.1 PREMATURE SUPRAVENTRICULAR BEAT: Status: ACTIVE | Noted: 2020-12-24

## 2020-12-24 LAB
ANION GAP SERPL CALCULATED.3IONS-SCNC: 10 MEQ/L (ref 8–16)
BUN BLDV-MCNC: 19 MG/DL (ref 7–22)
CALCIUM SERPL-MCNC: 10.3 MG/DL (ref 8.5–10.5)
CHLORIDE BLD-SCNC: 102 MEQ/L (ref 98–111)
CO2: 28 MEQ/L (ref 23–33)
CREAT SERPL-MCNC: 0.9 MG/DL (ref 0.4–1.2)
GFR SERPL CREATININE-BSD FRML MDRD: 61 ML/MIN/1.73M2
GLUCOSE BLD-MCNC: 122 MG/DL (ref 70–108)
POTASSIUM SERPL-SCNC: 4.1 MEQ/L (ref 3.5–5.2)
SODIUM BLD-SCNC: 140 MEQ/L (ref 135–145)

## 2020-12-28 ENCOUNTER — OFFICE VISIT (OUTPATIENT)
Dept: CARDIOLOGY CLINIC | Age: 70
End: 2020-12-28
Payer: MEDICARE

## 2020-12-28 ENCOUNTER — HOSPITAL ENCOUNTER (OUTPATIENT)
Dept: PHYSICAL THERAPY | Age: 70
Setting detail: THERAPIES SERIES
Discharge: HOME | End: 2020-12-28
Payer: MEDICARE

## 2020-12-28 VITALS
WEIGHT: 154 LBS | HEIGHT: 62 IN | BODY MASS INDEX: 28.34 KG/M2 | HEART RATE: 64 BPM | SYSTOLIC BLOOD PRESSURE: 124 MMHG | DIASTOLIC BLOOD PRESSURE: 70 MMHG

## 2020-12-28 PROCEDURE — 99213 OFFICE O/P EST LOW 20 MIN: CPT | Performed by: NUCLEAR MEDICINE

## 2020-12-28 PROCEDURE — 97110 THERAPEUTIC EXERCISES: CPT

## 2020-12-28 NOTE — PROGRESS NOTES
[facility],[address]  [address] [place], [place] [address]  Dept: [phone]  Dept Fax: [phone]  Loc: [phone]    Visit Date: 12/28/2020    Sharyle Mac is a 65 y.o. female who presents todayfor:  Chief Complaint   Patient presents with    Cardiac Clearance     Left Shoulder Replacement with Dr. Jefferson Littlejohn on 1-6-21    Hypertension    Hyperlipidemia   here again for shoulder pre op  Had knee clearance September  Did well  No chest pain   No changes in breathing  Active  Some risk for CAD  BP is stable  No dizziness  No syncope  On statins  No issues       HPI:  HPI  Past Medical History:   Diagnosis Date    Hyperlipidemia     Hypertension     Osteopenia       Past Surgical History:   Procedure Laterality Date   Edy Alcon Barahona; Benign left breast lesion    COLONOSCOPY      Dr Nkechi Lawson   last 8-2017   [facility] Right 08/08/2018    Endoveous Ablation     Family History   Problem Relation Age of Onset    Colon Cancer Mother     Heart Failure Father     Heart Disease Sister     Heart Attack Brother     Stomach Cancer Paternal Grandfather      Social History     Tobacco Use    Smoking status: Never Smoker    Smokeless tobacco: Never Used   Substance Use Topics    Alcohol use: Yes     Comment: occasional      Current Outpatient Medications   Medication Sig Dispense Refill    aspirin 81 MG EC tablet Take 81 mg by mouth daily      metoprolol succinate (TOPROL XL) 25 MG extended release tablet Take 1 tablet by mouth daily 90 tablet 1    alendronate (FOSAMAX) 70 MG tablet TAKE 1 TABLET EVERY 7 DAYS 12 tablet 3    losartan (COZAAR) 50 MG tablet TAKE 1 TABLET DAILY 90 tablet 3    ezetimibe (ZETIA) 10 MG tablet TAKE 1 TABLET DAILY (NEED TO SCHEDULE APPOINTMENT) 90 tablet 3    rosuvastatin (CRESTOR) 20 MG tablet TAKE 1 TABLET DAILY 90 tablet 3  Multiple Vitamins-Minerals (CENTRUM MULTI + OMEGA 3 PO) Take by mouth      Calcium Carb-Cholecalciferol (CALTRATE 600+D3 SOFT PO) Take by mouth       No current facility-administered medications for this visit. Allergies   Allergen Reactions    Demerol Hcl [Meperidine] Other (See Comments)     vomitting     Health Maintenance   Topic Date Due    Hepatitis C screen  1950    DTaP/Tdap/Td vaccine (1 - Tdap) 01/23/1967    Annual Wellness Visit (AWV)  06/20/2019    Lipid screen  02/22/2020    Potassium monitoring  12/23/2021    Creatinine monitoring  12/23/2021    Breast cancer screen  08/12/2022    Colon cancer screen colonoscopy  09/04/2025    DEXA (modify frequency per FRAX score)  Completed    Flu vaccine  Completed    Shingles Vaccine  Completed    Pneumococcal 65+ years Vaccine  Completed    Hepatitis A vaccine  Aged Out    Hepatitis B vaccine  Aged Out    Hib vaccine  Aged Out    Meningococcal (ACWY) vaccine  Aged Out       Subjective:  Review of Systems  General:   No fever, no chills, No fatigue or weight loss  Pulmonary:    No dyspnea, no wheezing  Cardiac:    Denies recent chest pain,   GI:     No nausea or vomiting, no abdominal pain  Neuro:    No dizziness or light headedness,   Musculoskeletal:  No recent active issues  Extremities:   No edema, no obvious claudication       Objective:  Physical Exam  /70   Pulse 64   Ht 5' 2\" (1.575 m)   Wt 154 lb (69.9 kg)   LMP  (LMP Unknown) Comment: menopausal   BMI 28.17 kg/m²   General:   Well developed, well nourished  Lungs:   Clear to auscultation  Heart:    Normal S1 S2, Slight murmur. no rubs, no gallops  Abdomen:   Soft, non tender, no organomegalies, positive bowel sounds  Extremities:   No edema, no cyanosis, good peripheral pulses  Neurological:   Awake, alert, oriented. No obvious focal deficits  Musculoskelatal:  No obvious deformities    Assessment:      Diagnosis Orders   1.  Essential hypertension 2. Familial hypercholesterolemia     as above  Cardiac fair for now       Plan:  No follow-ups on file. As above  Clear for surgery   Continue risk factor modification and medical management  Thank you for allowing me to participate in the care of your patient. Please don't hesitate to contact me regarding any further issues related to the patient care    Orders Placed:  No orders of the defined types were placed in this encounter. Medications Prescribed:  No orders of the defined types were placed in this encounter. Discussed use, benefit, and side effects of prescribed medications. All patient questions answered. Pt voicedunderstanding. Instructed to continue current medications, diet and exercise. Continue risk factor modification and medical management. Patient agreed with treatment plan. Follow up as directed.     Electronically signedby Kishor Mariscal MD on 12/28/2020 at 8:21 AM

## 2020-12-28 NOTE — PROGRESS NOTES
[facility]  PHYSICAL THERAPY  [x] DAILY NOTE (LAND) [] PROGRESS NOTE [] DISCHARGE NOTE    [] [facility]   [x] [address]    [] [facility][1]     Date: 2020  Patient Name:  Lilliana Lerma  : 1950  MRN: [id number]    Referring Practitioner Gregg Boswell DO   Diagnosis Unilateral primary osteoarthritis, left knee [M17.12]    Treatment Diagnosis Post-op left knee pain, decreased left knee AROM, left hip and knee weakness, difficulty ambulating   Date of Evaluation 10/21/20    Additional Pertinent History HTN, irregular heart beat, 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 PN score 65/80      Insurance: Primary: Payor: Chanda Palm /  /  / ,   Secondary:    Authorization Information: Aquatics and modalities covered except ionto, telehealth covered   Visit # 26, 3/10 for progress note   Visits Allowed: No limit   Recertification Date: 07   Physician Follow-Up: 20   Physician Orders: TKA protocol   History of Present Illness: S/p left TKA 20, had home health PT     SUBJECTIVE: Denies pain and reporting no other complains at this time.     TREATMENT   Precautions: WBAT LLE   Pain: soreness    X in shaded column indicates activity completed today   Modalities Parameters/  Location  Notes         Manual Therapy Time/Technique  Notes         Exercise/Intervention   Notes   Bike seat 1 5 minutes  x Able to make revolutions    Step stretches: left hamstring, knee flexion and calf  3 15 sec x    Heel Slides with strap  30   AROM L knee 0 - 122 degrees   Quad set on bolster  1x15 5     SLR  1x15 5      Leg Extension 10# 2 x15  x    Leg Press 30# L LE 2x15  x    Hamstring curl 30# L LE 2x15  x    Rocker Board f/b and s/s  30 taps   x    6 inch step up  30  x    Airex:heel/toe raises and marching  15  x Airex: tandem stance and narrow stance  1 minute each  x    SLS  15 seconds   x With UE support. Only able to go 2-3 seconds without UE support   Total gym squats  Heel raises  30  30  X  X      Specific Interventions Next Treatment: Continued to progress as able working on knee range of motion. Progressed with weight machines as able. Activity/Treatment Tolerance:  [x]  Patient tolerated treatment well  []  Patient limited by fatigue   []  Patient limited by pain   []  Patient limited by medical complications  []  Other:     Assessment:  Still having difficulty with balance but overall tolerating session well. GOALS:  Patient Goal: Return to normal mobility, gardening, going down to basement       REVISED GOALS:  STG'S - DEFERRED TO LTG'S  LTG'S- 4 WEEKS  1. Increase AROM L knee 0- 122 degrees to allow patient to walk in/out of clinic without cane with normal gait pattern. ONGOING  2. Increase strength L hip 4+/5, knee 5/5 to allow patient to go up and down steps reciprocally with 1 HR. MET FOR HIP 4+/5, KNEE 4+/5- ONGOING, ASCENDING RECIPROCALLY, DECENDING NON-RECIPROCALLY  3.  I with HEP as prescribed to allow patient to report able to walk community distance without cane and no LOB. ONGOING WITHOUT PAIN    REVISED GOALS:  STG'S - DEFERRED TO LTG'S  LTG'S- 4 WEEKS  1. Increase AROM L knee 0- 122 degrees to allow patient to walk in/out of clinic without cane with normal gait pattern. 2. Increase strength L , knee 5/5 to allow patient to go up and down steps reciprocally with 1 HR. 3.  I with HEP as prescribed to allow patient to report able to walk community distance without cane and no LOB. Patient Education:   []  HEP/Education Completed:   [x]  No new Education completed  []  Reviewed Prior HEP      []  Patient verbalized and/or demonstrated understanding of education provided. []  Patient unable to verbalize and/or demonstrate understanding of education provided. Will continue education. []  Barriers to learning:     PLAN:  Treatment Recommendations: Strengthening, Range of Motion, Balance Training, Functional Mobility Training, Gait Training, Stair Training, Manual Therapy - Soft Tissue Mobilization, Home Exercise Program, Patient Education and Modalities    [x]  Continue with current plan of care. 3 times per week for 3 weeks.   []  Modify plan of care as follows:    []  Hold pending physician visit  []  Discharge    Time In 931   Time Out 1005   Timed Code Minutes: 34 min   Total Treatment Time: 34 min       Electronically Signed by: Mayte Frye

## 2020-12-30 ENCOUNTER — HOSPITAL ENCOUNTER (OUTPATIENT)
Dept: PHYSICAL THERAPY | Age: 70
Setting detail: THERAPIES SERIES
Discharge: HOME | End: 2020-12-30
Payer: MEDICARE

## 2020-12-30 ENCOUNTER — TELEPHONE (OUTPATIENT)
Dept: FAMILY MEDICINE CLINIC | Age: 70
End: 2020-12-30

## 2020-12-30 PROCEDURE — 97110 THERAPEUTIC EXERCISES: CPT

## 2020-12-30 NOTE — PROGRESS NOTES
[facility]  PHYSICAL THERAPY  [x] DAILY NOTE (LAND) [] PROGRESS NOTE [] DISCHARGE NOTE    [] [address] [facility]   [x] [address]    [] [facility]     Date: 2020  Patient Name:  Lisbeth Storey  : 1950  MRN: [id number]    Referring Practitioner Keeley Sarkar DO   Diagnosis Unilateral primary osteoarthritis, left knee [M17.12]    Treatment Diagnosis Post-op left knee pain, decreased left knee AROM, left hip and knee weakness, difficulty ambulating   Date of Evaluation 10/21/20    Additional Pertinent History HTN, irregular heart beat, 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 PN score 65/80      Insurance: Primary: Payor: Banyan /  /  / ,   Secondary:    Authorization Information: Aquatics and modalities covered except ionto, telehealth covered   Visit # 26, 3/10 for progress note   Visits Allowed: No limit   Recertification Date:    Physician Follow-Up: 20   Physician Orders: TKA protocol   History of Present Illness: S/p left TKA 20, had home health PT     SUBJECTIVE: Denies pain and reporting no other complains at this time.     TREATMENT   Precautions: WBAT LLE   Pain: soreness    X in shaded column indicates activity completed today   Modalities Parameters/  Location  Notes         Manual Therapy Time/Technique  Notes         Exercise/Intervention   Notes   Bike seat 1 5 minutes  x Able to make revolutions    Step stretches: left hamstring, knee flexion and calf  3 15 sec x    Heel Slides with strap  30   AROM L knee 0 - 122 degrees   Quad set on bolster  1x15 5     SLR  1x15 5      Leg Extension 10# 2 x15  x    Leg Press 30# L LE 2x15  x    Hamstring curl 30# L LE 2x15  x    Rocker Board f/b and s/s  30 taps   x    6 inch step up  30  x    Airex:heel/toe raises and marching  15  x Airex: tandem stance and narrow stance  1 minute each  x    SLS  15 seconds   x With UE support. Only able to go 2-3 seconds without UE support   Total gym squats  Heel raises  30  30  X  X      Specific Interventions Next Treatment: Continued to progress as able working on knee range of motion. Progressed with weight machines as able. Activity/Treatment Tolerance:  [x]  Patient tolerated treatment well  []  Patient limited by fatigue   []  Patient limited by pain   []  Patient limited by medical complications  []  Other:     Assessment:  Patent last appointment with no complains. GOALS:  Patient Goal: Return to normal mobility, gardening, going down to basement       REVISED GOALS:  STG'S - DEFERRED TO LTG'S  LTG'S- 4 WEEKS  1. Increase AROM L knee 0- 122 degrees to allow patient to walk in/out of clinic without cane with normal gait pattern. ONGOING  2. Increase strength L hip 4+/5, knee 5/5 to allow patient to go up and down steps reciprocally with 1 HR. MET FOR HIP 4+/5, KNEE 4+/5- ONGOING, ASCENDING RECIPROCALLY, DECENDING NON-RECIPROCALLY  3.  I with HEP as prescribed to allow patient to report able to walk community distance without cane and no LOB. ONGOING WITHOUT PAIN    REVISED GOALS:  STG'S - DEFERRED TO LTG'S  LTG'S- 4 WEEKS  1. Increase AROM L knee 0- 122 degrees to allow patient to walk in/out of clinic without cane with normal gait pattern. 2. Increase strength L , knee 5/5 to allow patient to go up and down steps reciprocally with 1 HR. 3.  I with HEP as prescribed to allow patient to report able to walk community distance without cane and no LOB. Patient Education:   []  HEP/Education Completed:   [x]  No new Education completed  []  Reviewed Prior HEP      []  Patient verbalized and/or demonstrated understanding of education provided. []  Patient unable to verbalize and/or demonstrate understanding of education provided. Will continue education. []  Barriers to learning:     PLAN:  Treatment Recommendations: Strengthening, Range of Motion, Balance Training, Functional Mobility Training, Gait Training, Stair Training, Manual Therapy - Soft Tissue Mobilization, Home Exercise Program, Patient Education and Modalities    [x]  Continue with current plan of care. 3 times per week for 3 weeks.   []  Modify plan of care as follows:    []  Hold pending physician visit  []  Discharge    Time In 937   Time Out 1006   Timed Code Minutes: 29 min   Total Treatment Time: 29 min       Electronically Signed by: Rolando Spencer

## 2020-12-30 NOTE — TELEPHONE ENCOUNTER
[facility] received a call from:    Name of Caller: Kim Lazo     Relationship to patient: Medical Assistant     Organization name: [facility]     Best contact number:  [phone]    Reason for call: Patient is scheduled to have surgery 01/06/2020 and was required to get lab work completed prior. Kim Lazo is requesting the final EKG tracing and results of basic metabolic panel and CBC labs. Please fax results to [phone].

## 2021-01-04 ENCOUNTER — APPOINTMENT (OUTPATIENT)
Dept: PHYSICAL THERAPY | Age: 71
End: 2021-01-04
Payer: MEDICARE

## 2021-01-04 ENCOUNTER — TELEPHONE (OUTPATIENT)
Dept: FAMILY MEDICINE CLINIC | Age: 71
End: 2021-01-04

## 2021-01-06 NOTE — DISCHARGE SUMMARY
[facility] NOTE  OUTPATIENT  [address] [facility].    Patient Name: Valentín Conroy        CSN: [id number]   YOB: 1950  Gender: female  Vidhi Zamora DO,    Unilateral primary osteoarthritis, left knee [M17.12] ,      Patient is discharged from Physical Therapy services at this time. See last note for details related to results of therapy and goal achievement. Reason for discharge: patient discharged to [facility] per MD, is having shoulder surgery. Freya Moody # [id number]

## 2021-01-21 ENCOUNTER — HOSPITAL ENCOUNTER (OUTPATIENT)
Dept: OCCUPATIONAL THERAPY | Age: 71
Setting detail: THERAPIES SERIES
Discharge: HOME | End: 2021-01-21
Payer: MEDICARE

## 2021-01-21 PROCEDURE — 97165 OT EVAL LOW COMPLEX 30 MIN: CPT

## 2021-01-21 PROCEDURE — 97110 THERAPEUTIC EXERCISES: CPT

## 2021-01-21 ASSESSMENT — PATIENT HEALTH QUESTIONNAIRE - PHQ9
2. FEELING DOWN, DEPRESSED OR HOPELESS: 0
SUM OF ALL RESPONSES TO PHQ QUESTIONS 1-9: 0
SUM OF ALL RESPONSES TO PHQ QUESTIONS 1-9: 0
SUM OF ALL RESPONSES TO PHQ9 QUESTIONS 1 & 2: 0
1. LITTLE INTEREST OR PLEASURE IN DOING THINGS: 0
SUM OF ALL RESPONSES TO PHQ QUESTIONS 1-9: 0

## 2021-01-21 NOTE — FLOWSHEET NOTE
Pertinent History: Patient reports that she has had problems with left shoulder for a couple of years. Had right shoulder replacement 10/2018. Had left TKA on 9/2020. Reports that she had left TSA by Dr. Angi Bob on 1/6/21. Patient returned earlier this week and received order for therapy at that time. Comorbidities include HTN, irregular heart beat, and arthritis that could influence rehab process. SUBJECTIVE: Patient cooperative with evaluation. Social/Functional History:  Electronic Medical Record reviewed and up to date with the addition of percoset as needed for pain. States that she is taking them very seldom. Aris Guerra lives with spouse in a single story home with stairs and a handrail to enter. .    Task Prior Level of Function  (current level of function addressed below)   ADLs  Independent   Ambulation Independent   Transfers Independent   Hobbies Sewing, puzzles   Driving Active    Work Retired     OBJECTIVE:  Hand Dominance right handed   Palpation    Observation    Posture Good posture   Edema    Special Tests        ADL's Reports that she wakes up 3-4 x per night due to shoulder pain. States that she has moderate difficulty with hair care and dressing. Relates that she has moderate difficulty with bathing - uses shower seat, walker, and grab bar. States that she is has at least moderate difficulty with household activities. Bed Mobility     Transfers    Balance        Sensation Left Upper Extremity: Intact.    Coordination WNL           LEFT UPPER EXTREMITY  RANGE OF MOTION    AROM PROM COMMENTS         Shoulder Flexion  80    Shoulder External Rotation  25 at side            TREATMENT   Precautions: Dr. Angi Bob TSA protocol   Pain:  1/10 LOCATION: left shoulder    X in shaded column indicates Activity Completed Today   Modalities Parameters/  Location  Notes/Comments                     Manual Therapy Time/  Technique  Notes/Comments                     Exercises   Sets/ Sec Reps  Notes/Comments   Supine PROM to left shoulder for flexion and ER at side    x    Pendulums with left UE 1 10 each direction x    Scapular retraction 1 10 x    Small backward shoulder circles 1 10 x                         Activities Time    Notes/Comments                       Specific Interventions Next Treatment: advance per Dr. Emma Saeed protocol    Activity/Treatment Tolerance:  [x]  Patient tolerated treatment well  []  Patient limited by fatigue  []  Patient limited by pain   []  Patient limited by other medical complications  []  Other:     Assessment: Patient meets criteria for low complexity evaluation based on documented evaluation findings. Skilled therapy services are required to address pain management, ROM, eventual strength of left UE, and functional use of left UE to allow patient to be able to return to premorbid functional level. Areas for Improvement: impaired ROM, impaired strength, pain and impaired ADL  Prognosis: good    GOALS:  Patient Goal: Be able to do most everything with my left arm. Short Term Goals:  Time Frame: 4 weeks  1. Be independent with HEP as instructed to increase patient's ability to complete normal household activities. 2. Increase passive left shoulder flexion to 110 and ER at side to 45 to increase her flexibility for eventual use of left UE in dressing tasks. 3. Report no more than minimal difficulty with dressing. 4. Report pain going no higher than 2/10 at any time in left shoulder to assist in improving sleep routine. Long Term Goals:  Time Frame: 12 weeks  1. Be able to use left hand to turn steering wheel without pain or difficulty. 2. Be able to use left hand to retrieve item from drive-thru or use [employer] without difficulty. 3. Be able to complete hair care without pain in left UE. 4. Be able to retrieve casserole dish from oven using both UE without difficulty.     Patient Education: [x]  HEP/Education Completed: Plan of Care, Goals, HEP - scapular retraction, small backward shoulder circles, pendulums  ? []  No new Education completed  []  Reviewed Prior HEP      [x]  Patient verbalized and/or demonstrated understanding of education provided. []  Patient unable to verbalize and/or demonstrate understanding of education provided. Will continue education. [x]  Barriers to learning: none    PLAN:  Treatment Recommendations: Strengthening, Range of Motion, Manual Therapy - Soft Tissue Mobilization, Pain Management, Home Exercise Program, Patient Education and Self-Care Education and Training    [x]  Plan of care initiated. Plan to see patient 3 times per week for 12 weeks to address the treatment planned outlined above. []  Continue with current plan of care  []  Modify plan of care as follows:    []  Hold pending physician visit  []  Discharge    Time In 1120   Time Out 1200       Timed Code Minutes: Minutes Units   ADL (88969)     Aquatics (44500)     Manual Therapy (75503)     Neuro (52582)     Th. Activities (08738)     Th. Exercise (93744) 23 2   Wheelchair Mgmt (28180)     Cognitive Function (1st 15 min  07567)     Cognitive Function (per sub.  15 min  89005)     Ultrasound (39990)     Ionto (29280)     Manual E-Stim (33785)          TOTAL TREATMENT TIME: 40 minutes       Christiano Reese OTR/L #[id number]

## 2021-01-25 ENCOUNTER — HOSPITAL ENCOUNTER (OUTPATIENT)
Dept: OCCUPATIONAL THERAPY | Age: 71
Setting detail: THERAPIES SERIES
Discharge: HOME | End: 2021-01-25
Payer: MEDICARE

## 2021-01-25 PROCEDURE — 97110 THERAPEUTIC EXERCISES: CPT

## 2021-01-25 RX ORDER — METOPROLOL SUCCINATE 25 MG/1
25 TABLET, EXTENDED RELEASE ORAL DAILY
Qty: 90 TABLET | Refills: 1 | Status: SHIPPED | OUTPATIENT
Start: 2021-01-25 | End: 2021-08-19

## 2021-01-25 NOTE — PROGRESS NOTES
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 Had right shoulder replacement 10/2018. Had left TKA on 2020. Reports that she had left TSA by Dr. Ratna Mckeon on 21. Patient returned earlier this week and received order for therapy at that time. Comorbidities include HTN, irregular heart beat, and arthritis that could influence rehab process. SUBJECTIVE: Patient reports she tolerated evaluation well, HEP is going fine.          TREATMENT   Precautions: Dr. Ratna Mckeon TSA protocol   Pain:  1/10 LOCATION: left shoulder    X in shaded column indicates Activity Completed Today   Modalities Parameters/  Location  Notes/Comments                     Manual Therapy Time/  Technique  Notes/Comments   Very gentle STM to L upper trap to decrease tightness  x                Exercises   Sets/  Sec Reps  Notes/Comments Supine PROM to left shoulder for flexion and ER at side    x    Pendulums with left UE 1 10 each direction x    Scapular retraction 1 10 x    Small backward shoulder circles 1 10 x                         Activities Time    Notes/Comments                       Specific Interventions Next Treatment: advance per Dr. Joel Gamble protocol    Activity/Treatment Tolerance:  [x]  Patient tolerated treatment well  []  Patient limited by fatigue  []  Patient limited by pain   []  Patient limited by other medical complications  []  Other:     Assessment: Patient is progressing towards goals. Areas for Improvement: impaired ROM, impaired strength, pain and impaired ADL  Prognosis: good    GOALS:  Patient Goal: Be able to do most everything with my left arm. Short Term Goals:  Time Frame: 4 weeks  1. Be independent with HEP as instructed to increase patient's ability to complete normal household activities. 2. Increase passive left shoulder flexion to 110 and ER at side to 45 to increase her flexibility for eventual use of left UE in dressing tasks. 3. Report no more than minimal difficulty with dressing. 4. Report pain going no higher than 2/10 at any time in left shoulder to assist in improving sleep routine. Long Term Goals:  Time Frame: 12 weeks  1. Be able to use left hand to turn steering wheel without pain or difficulty. 2. Be able to use left hand to retrieve item from drive-thru or use SUKHI without difficulty. 3. Be able to complete hair care without pain in left UE. 4. Be able to retrieve casserole dish from oven using both UE without difficulty. Patient Education:   []  HEP/Education Completed: Plan of Care, Goals, HEP - scapular retraction, small backward shoulder circles, pendulums  ? []  No new Education completed  [x]  Reviewed Prior HEP      [x]  Patient verbalized and/or demonstrated understanding of education provided. []  Patient unable to verbalize and/or demonstrate understanding of education provided. Will continue education. [x]  Barriers to learning: none    PLAN:  Treatment Recommendations: Strengthening, Range of Motion, Manual Therapy - Soft Tissue Mobilization, Pain Management, Home Exercise Program, Patient Education and Self-Care Education and Training    []  Plan of care initiated. Plan to see patient 3 times per week for 12 weeks to address the treatment planned outlined above. [x]  Continue with current plan of care  []  Modify plan of care as follows:    []  Hold pending physician visit  []  Discharge    Time In 0845   Time Out 0913       Timed Code Minutes: Minutes Units   ADL (89383)     Aquatics (32572)     Manual Therapy (39478)     Neuro (64662)     Th. Activities (68570)     Th. Exercise (02183) 28 2   Wheelchair Mgmt (28890)     Cognitive Function (1st 15 min  25630)     Cognitive Function (per sub.  15 min  64230)     Ultrasound (13456)     Ionto (82851)     Manual E-Stim (73413)          TOTAL TREATMENT TIME: 28 minutes       ISABELLE COON/DELROY #[id number]

## 2021-01-26 ENCOUNTER — HOSPITAL ENCOUNTER (OUTPATIENT)
Dept: OCCUPATIONAL THERAPY | Age: 71
Setting detail: THERAPIES SERIES
Discharge: HOME | End: 2021-01-26
Payer: MEDICARE

## 2021-01-26 PROCEDURE — 97110 THERAPEUTIC EXERCISES: CPT

## 2021-01-26 NOTE — PROGRESS NOTES
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.512]  Bicipital tendinitis, left shoulder [M75.22]    Treatment Diagnosis Left shoulder pain   Date of Evaluation 21      Functional Outcome Measure Used UEFS   Functional Outcome Score  (21)       Insurance: Primary: Payor: Sheldon Lemus /  /  / ,   Secondary:    Authorization Information: Unlimited, no massage   Visit # 3, 3/10 for progress note   Visits Allowed: unlimited   Recertification Date: 05   Physician Follow-Up: 2021   Physician Orders: eval and treat per total shoulder protocol   Pertinent History: Patient reports that she has had problems with left shoulder for a couple of years. Had right shoulder replacement 10/2018. Had left TKA on 2020. Reports that she had left TSA by Dr. Gela Townsend on 21. Patient returned earlier this week and received order for therapy at that time. Comorbidities include HTN, irregular heart beat, and arthritis that could influence rehab process. SUBJECTIVE: Patient states that she hasn't had to take anything stronger than advil for the past few days.          TREATMENT   Precautions: Dr. Gela Townsend TSA protocol   Pain:  1/10 LOCATION: left shoulder    X in shaded column indicates Activity Completed Today   Modalities Parameters/  Location  Notes/Comments                     Manual Therapy Time/  Technique  Notes/Comments   STM to left upper trapezius   x                Exercises   Sets/  Sec Reps  Notes/Comments Supine PROM to left shoulder for flexion and ER at side    x    Pendulums with left UE 1 10 each direction x    Scapular retraction 1 10 x    Small backward shoulder circles 1 10 x                         Activities Time    Notes/Comments                       Specific Interventions Next Treatment: advance per Dr. Floresita Hines protocol    Activity/Treatment Tolerance:  [x]  Patient tolerated treatment well  []  Patient limited by fatigue  []  Patient limited by pain   []  Patient limited by other medical complications  []  Other:     Assessment: Patient is progressing towards goals appropriately. GOALS:  Patient Goal: Be able to do most everything with my left arm. Short Term Goals:  Time Frame: 4 weeks  1. Be independent with HEP as instructed to increase patient's ability to complete normal household activities. 2. Increase passive left shoulder flexion to 110 and ER at side to 45 to increase her flexibility for eventual use of left UE in dressing tasks. 3. Report no more than minimal difficulty with dressing. 4. Report pain going no higher than 2/10 at any time in left shoulder to assist in improving sleep routine. Long Term Goals:  Time Frame: 12 weeks  1. Be able to use left hand to turn steering wheel without pain or difficulty. 2. Be able to use left hand to retrieve item from drive-thru or use SUKHI without difficulty. 3. Be able to complete hair care without pain in left UE. 4. Be able to retrieve casserole dish from oven using both UE without difficulty. Patient Education:   []  HEP/Education Completed:   ?   []  No new Education completed  [x]  Reviewed Prior HEP      [x]  Patient verbalized and/or demonstrated understanding of education provided. []  Patient unable to verbalize and/or demonstrate understanding of education provided. Will continue education.   [x]  Barriers to learning: none    PLAN: []  Plan of care initiated. Plan to see patient 3 times per week for 12 weeks to address the treatment planned outlined above. [x]  Continue with current plan of care  []  Modify plan of care as follows:    []  Hold pending physician visit  []  Discharge    Time In 1150   Time Out 1215       Timed Code Minutes: Minutes Units   ADL (64565)     Aquatics (62885)     Manual Therapy (28020)     Neuro (41529)     Th. Activities (47000)     Th. Exercise (41268) 25 2   Wheelchair Mgmt (43170)     Cognitive Function (1st 15 min  27349)     Cognitive Function (per sub.  15 min  19239)     Ultrasound (21059)     Ionto (29651)     Manual E-Stim (04949)          TOTAL TREATMENT TIME: 25 minutes       Sigifredo Madrigal OTR/DELROY #[id number]

## 2021-01-28 ENCOUNTER — HOSPITAL ENCOUNTER (OUTPATIENT)
Dept: OCCUPATIONAL THERAPY | Age: 71
Setting detail: THERAPIES SERIES
Discharge: HOME | End: 2021-01-28
Payer: MEDICARE

## 2021-01-28 PROCEDURE — 97110 THERAPEUTIC EXERCISES: CPT

## 2021-01-28 NOTE — PROGRESS NOTES
[address]  [] EVALUATION  [x] DAILY NOTE (LAND) [] DAILY NOTE (AQUATIC ) [] PROGRESS NOTE [] DISCHARGE NOTE    [] [address] 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 Had right shoulder replacement 10/2018. Had left TKA on 2020. Reports that she had left TSA by Dr. Pepper Jones on 21. Patient returned earlier this week and received order for therapy at that time. Comorbidities include HTN, irregular heart beat, and arthritis that could influence rehab process. SUBJECTIVE: States that her shoulder is doing really well. States that she took some advil this morning.       TREATMENT   Precautions: Dr. Pepper Jones TSA protocol   Pain:  1/10 LOCATION: left shoulder    X in shaded column indicates Activity Completed Today   Modalities Parameters/  Location  Notes/Comments                     Manual Therapy Time/  Technique  Notes/Comments   STM to left upper trapezius   x                Exercises   Sets/  Sec Reps  Notes/Comments Supine PROM to left shoulder for flexion and ER at side    x    Pendulums with left UE 1 10 each direction x    Scapular retraction 1 10 x    Small backward shoulder circles 1 10 x                         Activities Time    Notes/Comments                       Specific Interventions Next Treatment: advance per Dr. Shanel Villasenor protocol    Activity/Treatment Tolerance:  [x]  Patient tolerated treatment well  []  Patient limited by fatigue  []  Patient limited by pain   []  Patient limited by other medical complications  []  Other:     Assessment: Patient is progressing towards goals appropriately with very few pain complaints. GOALS:  Patient Goal: Be able to do most everything with my left arm. Short Term Goals:  Time Frame: 4 weeks  1. Be independent with HEP as instructed to increase patient's ability to complete normal household activities. 2. Increase passive left shoulder flexion to 110 and ER at side to 45 to increase her flexibility for eventual use of left UE in dressing tasks. 3. Report no more than minimal difficulty with dressing. 4. Report pain going no higher than 2/10 at any time in left shoulder to assist in improving sleep routine. Long Term Goals:  Time Frame: 12 weeks  1. Be able to use left hand to turn steering wheel without pain or difficulty. 2. Be able to use left hand to retrieve item from drive-thru or use SUKHI without difficulty. 3. Be able to complete hair care without pain in left UE. 4. Be able to retrieve casserole dish from oven using both UE without difficulty. Patient Education:   []  HEP/Education Completed:   ?   []  No new Education completed  [x]  Reviewed Prior HEP      [x]  Patient verbalized and/or demonstrated understanding of education provided. []  Patient unable to verbalize and/or demonstrate understanding of education provided. Will continue education.   [x]  Barriers to learning: none    PLAN: []  Plan of care initiated. Plan to see patient 3 times per week for 12 weeks to address the treatment planned outlined above. [x]  Continue with current plan of care  []  Modify plan of care as follows:    []  Hold pending physician visit  []  Discharge    Time In 1150   Time Out 1215       Timed Code Minutes: Minutes Units   ADL (01180)     Aquatics (38904)     Manual Therapy (38934)     Neuro (54901)     Th. Activities (15337)     Th. Exercise (71574) 25 2   Wheelchair Mgmt (07179)     Cognitive Function (1st 15 min  23574)     Cognitive Function (per sub.  15 min  54726)     Ultrasound (08180)     Ionto (71751)     Manual E-Stim (42367)          TOTAL TREATMENT TIME: 25 minutes       Get Maldonado, OTR/L #[id number]

## 2021-02-01 ENCOUNTER — HOSPITAL ENCOUNTER (OUTPATIENT)
Dept: OCCUPATIONAL THERAPY | Age: 71
Setting detail: THERAPIES SERIES
End: 2021-02-01
Payer: MEDICARE

## 2021-02-01 ENCOUNTER — HOSPITAL ENCOUNTER (OUTPATIENT)
Dept: OCCUPATIONAL THERAPY | Age: 71
Setting detail: THERAPIES SERIES
Discharge: HOME | End: 2021-02-01
Payer: MEDICARE

## 2021-02-01 PROCEDURE — 97110 THERAPEUTIC EXERCISES: CPT

## 2021-02-01 NOTE — PROGRESS NOTES
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.512]  Bicipital tendinitis, left shoulder [M75.22]    Treatment Diagnosis Left shoulder pain   Date of Evaluation 21      Functional Outcome Measure Used UEFS   Functional Outcome Score  (21)       Insurance: Primary: Payor:  [employer] /  /  / ,   Secondary:    Authorization Information: Unlimited, no massage   Visit # 5, 5/10 for progress note   Visits Allowed: unlimited   Recertification Date: 3/07/27   Physician Follow-Up: 2021   Physician Orders: eval and treat per total shoulder protocol   Pertinent History: Patient reports that she has had problems with left shoulder for a couple of years. Had right shoulder replacement 10/2018. Had left TKA on 2020. Reports that she had left TSA by Dr. Ana Tapia on 21. Patient returned earlier this week and received order for therapy at that time. Comorbidities include HTN, irregular heart beat, and arthritis that could influence rehab process. SUBJECTIVE: States that things are going well. States that she is starting to go without her sling for short periods of time at home, but is not using her left arm actively in tasks.       TREATMENT   Precautions: Dr. Ana Tapia TSA protocol   Pain:  1/10 LOCATION: left shoulder    X in shaded column indicates Activity Completed Today   Modalities Parameters/  Location  Notes/Comments                     Manual Therapy Time/  Technique  Notes/Comments STM to left upper trapezius and left bicep tendon region  x                Exercises   Sets/  Sec Reps  Notes/Comments   Supine PROM to left shoulder for flexion and ER at side    x    Pendulums with left UE 1 10 each direction x    Scapular retraction 1 10 x    Small backward shoulder circles 1 10 x                         Activities Time    Notes/Comments                       Specific Interventions Next Treatment: advance per Dr. Vaishali Vigil protocol    Activity/Treatment Tolerance:  [x]  Patient tolerated treatment well  []  Patient limited by fatigue  []  Patient limited by pain   []  Patient limited by other medical complications  []  Other:     Assessment: Patient is progressing towards goals appropriately with very few pain complaints. GOALS:  Patient Goal: Be able to do most everything with my left arm. Short Term Goals:  Time Frame: 4 weeks  1. Be independent with HEP as instructed to increase patient's ability to complete normal household activities. 2. Increase passive left shoulder flexion to 110 and ER at side to 45 to increase her flexibility for eventual use of left UE in dressing tasks. 3. Report no more than minimal difficulty with dressing. 4. Report pain going no higher than 2/10 at any time in left shoulder to assist in improving sleep routine. Long Term Goals:  Time Frame: 12 weeks  1. Be able to use left hand to turn steering wheel without pain or difficulty. 2. Be able to use left hand to retrieve item from drive-thru or use SUKHI without difficulty. 3. Be able to complete hair care without pain in left UE. 4. Be able to retrieve casserole dish from oven using both UE without difficulty. Patient Education:   []  HEP/Education Completed:   ?   []  No new Education completed  [x]  Reviewed Prior HEP      [x]  Patient verbalized and/or demonstrated understanding of education provided. []  Patient unable to verbalize and/or demonstrate understanding of education provided. Will continue education. [x]  Barriers to learning: none    PLAN:      []  Plan of care initiated. Plan to see patient 3 times per week for 12 weeks to address the treatment planned outlined above. [x]  Continue with current plan of care  []  Modify plan of care as follows:    []  Hold pending physician visit  []  Discharge    Time In 1500   Time Out 1525       Timed Code Minutes: Minutes Units   ADL (59708)     Aquatics (82640)     Manual Therapy (95221)     Neuro (03188)     Th. Activities (14594)     Th. Exercise (56268) 25 2   Wheelchair Mgmt (55345)     Cognitive Function (1st 15 min  00500)     Cognitive Function (per sub.  15 min  25050)     Ultrasound (75184)     Ionto (94059)     Manual E-Stim (19304)          TOTAL TREATMENT TIME: 25 minutes       RAVI Lanier/DELROY #[id number]

## 2021-02-02 ENCOUNTER — HOSPITAL ENCOUNTER (OUTPATIENT)
Dept: OCCUPATIONAL THERAPY | Age: 71
Setting detail: THERAPIES SERIES
End: 2021-02-02
Payer: MEDICARE

## 2021-02-04 ENCOUNTER — HOSPITAL ENCOUNTER (OUTPATIENT)
Dept: OCCUPATIONAL THERAPY | Age: 71
Setting detail: THERAPIES SERIES
Discharge: HOME | End: 2021-02-04
Payer: MEDICARE

## 2021-02-04 PROCEDURE — 97110 THERAPEUTIC EXERCISES: CPT

## 2021-02-04 NOTE — PROGRESS NOTES
[address]  [] EVALUATION  [x] DAILY NOTE (LAND) [] DAILY NOTE (AQUATIC ) [] PROGRESS NOTE [] DISCHARGE NOTE    [] [address] [facility]   [x] [address]    [] 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.512]  Bicipital tendinitis, left shoulder [M75.22]    Treatment Diagnosis Left shoulder pain   Date of Evaluation 21      Functional Outcome Measure Used UEFS   Functional Outcome Score  (21)       Insurance: Primary: Payor: [employer] /  /  / ,   Secondary:    Authorization Information: Unlimited, no massage   Visit # 6, 6/10 for progress note   Visits Allowed: unlimited   Recertification Date:    Physician Follow-Up: 2021   Physician Orders: eval and treat per total shoulder protocol   Pertinent History: Patient reports that she has had problems with left shoulder for a couple of years. Had right shoulder replacement 10/2018. Had left TKA on 2020. Reports that she had left TSA by Dr. Ratna Mckeon on 21. Patient returned earlier this week and received order for therapy at that time. Comorbidities include HTN, irregular heart beat, and arthritis that could influence rehab process. SUBJECTIVE: States that her arm is doing \"pretty good. \"     TREATMENT   Precautions: Dr. Ratna Mckeon TSA protocol   Pain:  1/10 LOCATION: left shoulder    X in shaded column indicates Activity Completed Today   Modalities Parameters/  Location  Notes/Comments                     Manual Therapy Time/  Technique  Notes/Comments   STM to left upper trapezius and left bicep tendon region                  Exercises   Sets/  Sec Reps  Notes/Comments Supine PROM to left shoulder for flexion, abduction <90, IR/ER at approximately 30 abduction   x    Pendulums with left UE 1 10 each direction x    Scapular retraction 1 10 x    Small backward shoulder circles 1 10 x    Pulleys for shoulder flexion 1 15 x                  Activities Time    Notes/Comments                       Specific Interventions Next Treatment: advance per Dr. Alanna Rojas protocol    Activity/Treatment Tolerance:  [x]  Patient tolerated treatment well  []  Patient limited by fatigue  []  Patient limited by pain   []  Patient limited by other medical complications  []  Other:     Assessment: Patient is progressing towards goals appropriately. GOALS:  Patient Goal: Be able to do most everything with my left arm. Short Term Goals:  Time Frame: 4 weeks  1. Be independent with HEP as instructed to increase patient's ability to complete normal household activities. 2. Increase passive left shoulder flexion to 110 and ER at side to 45 to increase her flexibility for eventual use of left UE in dressing tasks. 3. Report no more than minimal difficulty with dressing. 4. Report pain going no higher than 2/10 at any time in left shoulder to assist in improving sleep routine. Long Term Goals:  Time Frame: 12 weeks  1. Be able to use left hand to turn steering wheel without pain or difficulty. 2. Be able to use left hand to retrieve item from drive-thru or use SUKHI without difficulty. 3. Be able to complete hair care without pain in left UE. 4. Be able to retrieve casserole dish from oven using both UE without difficulty. Patient Education:   []  HEP/Education Completed:   ?   []  No new Education completed  [x]  Reviewed Prior HEP      [x]  Patient verbalized and/or demonstrated understanding of education provided. []  Patient unable to verbalize and/or demonstrate understanding of education provided. Will continue education.   [x]  Barriers to learning: none    PLAN: []  Plan of care initiated. Plan to see patient 3 times per week for 12 weeks to address the treatment planned outlined above. [x]  Continue with current plan of care  []  Modify plan of care as follows:    []  Hold pending physician visit  []  Discharge    Time In 1120   Time Out 1150       Timed Code Minutes: Minutes Units   ADL (11462)     Aquatics (69755)     Manual Therapy (92831)     Neuro (00005)     Th. Activities (71793)     Th. Exercise (44535) 30 2   Wheelchair Mgmt (37199)     Cognitive Function (1st 15 min  86041)     Cognitive Function (per sub.  15 min  26574)     Ultrasound (86621)     Ionto (04442)     Manual E-Stim (87491)          TOTAL TREATMENT TIME: 30 minutes       Federica Fisher OTR/L #[id number]

## 2021-02-05 ENCOUNTER — HOSPITAL ENCOUNTER (OUTPATIENT)
Dept: OCCUPATIONAL THERAPY | Age: 71
Setting detail: THERAPIES SERIES
Discharge: HOME | End: 2021-02-05
Payer: MEDICARE

## 2021-02-05 PROCEDURE — 97110 THERAPEUTIC EXERCISES: CPT

## 2021-02-05 NOTE — PROGRESS NOTES
[address]  [] EVALUATION  [x] DAILY NOTE (LAND) [] DAILY NOTE (AQUATIC ) [] PROGRESS NOTE [] DISCHARGE NOTE    [] [facility]   [x] Leonid [address]    [] 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 Had right shoulder replacement 10/2018. Had left TKA on 2020. Reports that she had left TSA by Dr. Ratna Mckeon on 21. Patient returned earlier this week and received order for therapy at that time. Comorbidities include HTN, irregular heart beat, and arthritis that could influence rehab process. SUBJECTIVE: States that she takes her sling off periodically during the day.       TREATMENT   Precautions: Dr. Ratna Mckeon TSA protocol   Pain:  1/10 LOCATION: left shoulder    X in shaded column indicates Activity Completed Today   Modalities Parameters/  Location  Notes/Comments                     Manual Therapy Time/  Technique  Notes/Comments   STM to left upper trapezius and left bicep tendon region                  Exercises   Sets/  Sec Reps  Notes/Comments Supine PROM to left shoulder for flexion, abduction <90, IR/ER at approximately 30 abduction   x    Pendulums with left UE 1 10 each direction x    Scapular retraction 1 10 x    Small backward shoulder circles 1 10 x    Pulleys for shoulder flexion 1 15 x    Supine self assist bench press  1 18 x    Supine self assist flexion to tolerance 1 10 x                                Activities Time    Notes/Comments                       Specific Interventions Next Treatment: advance per Dr. Gagnon Shoulder protocol    Activity/Treatment Tolerance:  [x]  Patient tolerated treatment well  []  Patient limited by fatigue  []  Patient limited by pain   []  Patient limited by other medical complications  []  Other:     Assessment: Patient is progressing towards goals appropriately. Patient is 4 weeks post op. Progressed with assisted flexion as indicated in protocol for late phase I.        GOALS:  Patient Goal: Be able to do most everything with my left arm. Short Term Goals:  Time Frame: 4 weeks  1. Be independent with HEP as instructed to increase patient's ability to complete normal household activities. 2. Increase passive left shoulder flexion to 110 and ER at side to 45 to increase her flexibility for eventual use of left UE in dressing tasks. 3. Report no more than minimal difficulty with dressing. 4. Report pain going no higher than 2/10 at any time in left shoulder to assist in improving sleep routine. Long Term Goals:  Time Frame: 12 weeks  1. Be able to use left hand to turn steering wheel without pain or difficulty. 2. Be able to use left hand to retrieve item from drive-thru or use SUKHI without difficulty. 3. Be able to complete hair care without pain in left UE. 4. Be able to retrieve casserole dish from oven using both UE without difficulty. Patient Education:   [x]  HEP/Education Completed: added supine self assist to HEP  ?    []  No new Education completed  [x]  Reviewed Prior HEP [x]  Patient verbalized and/or demonstrated understanding of education provided. []  Patient unable to verbalize and/or demonstrate understanding of education provided. Will continue education. [x]  Barriers to learning: none    PLAN:      []  Plan of care initiated. Plan to see patient 3 times per week for 12 weeks to address the treatment planned outlined above. [x]  Continue with current plan of care  []  Modify plan of care as follows:    []  Hold pending physician visit  []  Discharge    Time In 0815   Time Out 0840       Timed Code Minutes: Minutes Units   ADL (05886)     Aquatics (34668)     Manual Therapy (96484)     Neuro (34870)     Th. Activities (75289)     Th. Exercise (84356) 25 2   Wheelchair Mgmt (82912)     Cognitive Function (1st 15 min  21391)     Cognitive Function (per sub.  15 min  38241)     Ultrasound (14919)     Ionto (18378)     Manual E-Stim (49205)          TOTAL TREATMENT TIME: 25 minutes       Corlis Scheuermann, [facility], OTR/L [id number]

## 2021-02-08 ENCOUNTER — HOSPITAL ENCOUNTER (OUTPATIENT)
Dept: OCCUPATIONAL THERAPY | Age: 71
Setting detail: THERAPIES SERIES
Discharge: HOME | End: 2021-02-08
Payer: MEDICARE

## 2021-02-08 PROCEDURE — 97110 THERAPEUTIC EXERCISES: CPT

## 2021-02-08 NOTE — PROGRESS NOTES
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.512]  Bicipital tendinitis, left shoulder [M75.22]    Treatment Diagnosis Left shoulder pain   Date of Evaluation 21      Functional Outcome Measure Used UEFS   Functional Outcome Score  (21)       Insurance: Primary: Payor: [employer] /  /  / ,   Secondary:    Authorization Information: Unlimited, no massage   Visit # 8, 8/10 for progress note   Visits Allowed: unlimited   Recertification Date: 3/96/52   Physician Follow-Up: 2021   Physician Orders: eval and treat per total shoulder protocol   Pertinent History: Patient reports that she has had problems with left shoulder for a couple of years. Had right shoulder replacement 10/2018. Had left TKA on 2020. Reports that she had left TSA by Dr. Ratna Mckeon on 21. Patient returned earlier this week and received order for therapy at that time. Comorbidities include HTN, irregular heart beat, and arthritis that could influence rehab process. SUBJECTIVE: Patient is pleasant and cooperative, nothing new to report.       TREATMENT   Precautions: Dr. Ratna Mckeon TSA protocol   Pain:  0/10 LOCATION: left shoulder    X in shaded column indicates Activity Completed Today   Modalities Parameters/  Location  Notes/Comments                     Manual Therapy Time/  Technique  Notes/Comments   STM to left upper trapezius  x                Exercises   Sets/  Sec Reps  Notes/Comments Supine PROM to left shoulder for flexion, abduction <90, IR/ER at approximately 30 abduction   x    Pendulums with left UE 1 10 each direction x    Scapular retraction 1 10 x    Small backward shoulder circles 1 10 x    Pulleys for shoulder flexion 1 15 x    Supine self assist bench press  1 18 x    Supine self assist flexion to tolerance 1 10 x                                Activities Time    Notes/Comments                       Specific Interventions Next Treatment: advance per Dr. Alanna Rojas protocol    Activity/Treatment Tolerance:  [x]  Patient tolerated treatment well  []  Patient limited by fatigue  []  Patient limited by pain   []  Patient limited by other medical complications  []  Other:     Assessment: Patient is progressing towards goals appropriately. GOALS:  Patient Goal: Be able to do most everything with my left arm. Short Term Goals:  Time Frame: 4 weeks  1. Be independent with HEP as instructed to increase patient's ability to complete normal household activities. 2. Increase passive left shoulder flexion to 110 and ER at side to 45 to increase her flexibility for eventual use of left UE in dressing tasks. 3. Report no more than minimal difficulty with dressing. 4. Report pain going no higher than 2/10 at any time in left shoulder to assist in improving sleep routine. Long Term Goals:  Time Frame: 12 weeks  1. Be able to use left hand to turn steering wheel without pain or difficulty. 2. Be able to use left hand to retrieve item from drive-thru or use SUKHI without difficulty. 3. Be able to complete hair care without pain in left UE. 4. Be able to retrieve casserole dish from oven using both UE without difficulty. Patient Education:   []  HEP/Education Completed: added supine self assist to HEP  ? []  No new Education completed  [x]  Reviewed Prior HEP; reports AAROM is going well. [x]  Patient verbalized and/or demonstrated understanding of education provided.

## 2021-02-09 ENCOUNTER — HOSPITAL ENCOUNTER (OUTPATIENT)
Dept: OCCUPATIONAL THERAPY | Age: 71
Setting detail: THERAPIES SERIES
Discharge: HOME | End: 2021-02-09
Payer: MEDICARE

## 2021-02-09 PROCEDURE — 97110 THERAPEUTIC EXERCISES: CPT

## 2021-02-09 NOTE — PROGRESS NOTES
[address]  [] EVALUATION  [x] DAILY NOTE (LAND) [] DAILY NOTE (AQUATIC ) [] PROGRESS NOTE [] DISCHARGE NOTE    [] [address] [facility]   [x] Leonid [address]    [] 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.512]  Bicipital tendinitis, left shoulder [M75.22]    Treatment Diagnosis Left shoulder pain   Date of Evaluation 21      Functional Outcome Measure Used UEFS   Functional Outcome Score  (21)       Insurance: Primary: Payor: Belvin Serve /  /  / ,   Secondary:    Authorization Information: Unlimited, no massage   Visit # 9, 9/10 for progress note   Visits Allowed: unlimited   Recertification Date: 24   Physician Follow-Up: 3/2/21   Physician Orders: eval and treat per total shoulder protocol   Pertinent History: Patient reports that she has had problems with left shoulder for a couple of years. Had right shoulder replacement 10/2018. Had left TKA on 2020. Reports that she had left TSA by Dr. Mohsen Caraballo on 21. Patient returned earlier this week and received order for therapy at that time. Comorbidities include HTN, irregular heart beat, and arthritis that could influence rehab process. SUBJECTIVE: Patient states that her shoulder is \"pretty good. \"    TREATMENT   Precautions: Dr. Mohsen Caraballo TSA protocol   Pain:  0/10 LOCATION: left shoulder    X in shaded column indicates Activity Completed Today   Modalities Parameters/  Location  Notes/Comments                     Manual Therapy Time/  Technique  Notes/Comments   STM to left upper trapezius                  Exercises   Sets/  Sec Reps  Notes/Comments Supine PROM to left shoulder for flexion, abduction <90, IR/ER at approximately 30 abduction   x    Pendulums with left UE 1 10 each direction x    Scapular retraction 1 10 x    Small backward shoulder circles 1 10 x    Pulleys for shoulder flexion 1 15 x    Supine self assist bench press  1 15 x    Supine self assist flexion to tolerance 1 15 x    Bilateral table slides for shoulder flexion 1 10 x                         Activities Time    Notes/Comments                       Specific Interventions Next Treatment: advance per Dr. Aiem Loera protocol    Activity/Treatment Tolerance:  [x]  Patient tolerated treatment well  []  Patient limited by fatigue  []  Patient limited by pain   []  Patient limited by other medical complications  []  Other:     Assessment: Patient is progressing towards goals appropriately. GOALS:  Patient Goal: Be able to do most everything with my left arm. Short Term Goals:  Time Frame: 4 weeks  1. Be independent with HEP as instructed to increase patient's ability to complete normal household activities. 2. Increase passive left shoulder flexion to 110 and ER at side to 45 to increase her flexibility for eventual use of left UE in dressing tasks. 3. Report no more than minimal difficulty with dressing. 4. Report pain going no higher than 2/10 at any time in left shoulder to assist in improving sleep routine. Long Term Goals:  Time Frame: 12 weeks  1. Be able to use left hand to turn steering wheel without pain or difficulty. 2. Be able to use left hand to retrieve item from drive-thru or use SUKHI without difficulty. 3. Be able to complete hair care without pain in left UE. 4. Be able to retrieve casserole dish from oven using both UE without difficulty. Patient Education:   []  HEP/Education Completed:   ?   []  No new Education completed  [x]  Reviewed Prior HEP; reports AAROM is going well. [x]  Patient verbalized and/or demonstrated understanding of education provided. []  Patient unable to verbalize and/or demonstrate understanding of education provided. Will continue education. [x]  Barriers to learning: none    PLAN:      []  Plan of care initiated. Plan to see patient 3 times per week for 12 weeks to address the treatment planned outlined above. [x]  Continue with current plan of care  []  Modify plan of care as follows:    []  Hold pending physician visit  []  Discharge    Time In 1345   Time Out 1415       Timed Code Minutes: Minutes Units   ADL (78567)     Aquatics (48951)     Manual Therapy (90050)     Neuro (23324)     Th. Activities (00468)     Th. Exercise (17077) 30 2   Wheelchair Mgmt (39200)     Cognitive Function (1st 15 min  95108)     Cognitive Function (per sub.  15 min  26065)     Ultrasound (77589)     Ionto (26119)     Manual E-Stim (64050)          TOTAL TREATMENT TIME: 30 minutes       RAVI Chang/DELROY #[id number]

## 2021-02-11 ENCOUNTER — HOSPITAL ENCOUNTER (OUTPATIENT)
Dept: OCCUPATIONAL THERAPY | Age: 71
Setting detail: THERAPIES SERIES
Discharge: HOME | End: 2021-02-11
Payer: MEDICARE

## 2021-02-11 PROCEDURE — 97110 THERAPEUTIC EXERCISES: CPT

## 2021-02-11 NOTE — PROGRESS NOTES
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 Had right shoulder replacement 10/2018. Had left TKA on 2020. Reports that she had left TSA by Dr. Radha Broussard on 21. Patient returned earlier this week and received order for therapy at that time. Comorbidities include HTN, irregular heart beat, and arthritis that could influence rehab process. SUBJECTIVE: Patient states that overall she is doing \"very good. \" States that she feels as though she is doing better with this shoulder compared to her last shoulder.      TREATMENT   Precautions: Dr. Radha Broussard TSA protocol   Pain:  0/10 LOCATION: left shoulder    X in shaded column indicates Activity Completed Today   Modalities Parameters/  Location  Notes/Comments                     Manual Therapy Time/  Technique  Notes/Comments   STM to left upper trapezius 2. Increase passive left shoulder flexion to 110 and ER at side to 45 to increase her flexibility for eventual use of left UE in dressing tasks. GOAL MET - SEE ABOVE FOR MEASUREMENTS - REVISED GOAL: Increase passive left shoulder flexion to 155, abduction to 110, and ER at side to 60 to increase her flexibility to complete normal daily tasks. 3. Report no more than minimal difficulty with dressing. GOAL MET - REVISED GOAL; Be able to use left hand to assist in washing hair without pain. 4. Report pain going no higher than 2/10 at any time in left shoulder to assist in improving sleep routine. GOAL MET - REVISED GOAL: Be able to use her left arm to load dishes into . 5. NEW GOAL: Increase active left shoulder flexion to 125, abduction to 95, and ER at side to 50 to increase her ability to use left hand for hair care. Long Term Goals:  Time Frame: 9 weeks  1. Be able to use left hand to turn steering wheel without pain or difficulty. GOAL NOT MET - CONTINUE GOAL  2. Be able to use left hand to retrieve item from drive-thru or use SUKHI without difficulty. GOAL NOT MET - CONTINUE GOAL  3. Be able to complete hair care without pain in left UE. GOAL NOT MET - CONTINUE GOAL  4. Be able to retrieve casserole dish from oven using both UE without difficulty. GOAL NOT MET - CONTINUE GOAL    Patient Education:   [x]  HEP/Education Completed: goal status  ? []  No new Education completed  [x]  Reviewed Prior HEP; reports AAROM is going well. [x]  Patient verbalized and/or demonstrated understanding of education provided. []  Patient unable to verbalize and/or demonstrate understanding of education provided. Will continue education. [x]  Barriers to learning: none    PLAN:      []  Plan of care initiated.     []  Continue with current plan of care  [x]  Modify plan of care as follows: 2 x week x 9 weeks from 2/11/21   []  Hold pending physician visit  []  Discharge    Time In 1100   Time Out 1130 Timed Code Minutes: Minutes Units   ADL (45513)     Aquatics (09207)     Manual Therapy (39135)     Neuro (13628)     Th. Activities (96666)     Th. Exercise (46299) 30 2   Wheelchair Mgmt (63462)     Cognitive Function (1st 15 min  17139)     Cognitive Function (per sub.  15 min  41083)     Ultrasound (39648)     Ionto (15408)     Manual E-Stim (95897)          TOTAL TREATMENT TIME: 30 minutes       Martha Nicole OTR/L #[id number]

## 2021-02-15 ENCOUNTER — HOSPITAL ENCOUNTER (OUTPATIENT)
Dept: OCCUPATIONAL THERAPY | Age: 71
Setting detail: THERAPIES SERIES
Discharge: HOME | End: 2021-02-15
Payer: MEDICARE

## 2021-02-15 PROCEDURE — 97110 THERAPEUTIC EXERCISES: CPT

## 2021-02-15 NOTE — PROGRESS NOTES
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.512]  Bicipital tendinitis, left shoulder [M75.22]    Treatment Diagnosis Left shoulder pain   Date of Evaluation 21      Functional Outcome Measure Used UEFS   Functional Outcome Score  (21)       Insurance: Primary: Payor: Tony Ferris /  /  / ,   Secondary:    Authorization Information: Unlimited, no massage   Visit # 11; 1/10 for PN; PN completed on 21   Visits Allowed: unlimited   Recertification Date:    Physician Follow-Up: 3/2/21   Physician Orders: eval and treat per total shoulder protocol   Pertinent History: Patient reports that she has had problems with left shoulder for a couple of years. Had right shoulder replacement 10/2018. Had left TKA on 2020. Reports that she had left TSA by Dr. Denisha Ortega on 21. Patient returned earlier this week and received order for therapy at that time. Comorbidities include HTN, irregular heart beat, and arthritis that could influence rehab process. SUBJECTIVE: States that she is doing \"real good. \"     TREATMENT   Precautions: Dr. Denisha Ortega TSA protocol   Pain:  1/10 LOCATION: left shoulder    X in shaded column indicates Activity Completed Today   Modalities Parameters/  Location  Notes/Comments                     Manual Therapy Time/  Technique  Notes/Comments   STM to left upper trapezius                  Exercises   Sets/  Sec Reps  Notes/Comments Supine PROM to left shoulder in all planes per physician protcol   x    Pendulums with left UE 1 10 each direction     Scapular retraction 1 10     Small backward shoulder circles 1 10     Pulleys for shoulder flexion 1 15 x    Supine self assist bench press  1 15     Supine self assist flexion to tolerance 1 15     Bilateral table slides for shoulder flexion 1 10     Supine dowel for shoulder flexion 1 15 x    Supine dowel for chest press 1 15 x           Activities Time    Notes/Comments                         Specific Interventions Next Treatment: advance per Dr. Crista Godinez protocol    Activity/Treatment Tolerance:  [x]  Patient tolerated treatment well  []  Patient limited by fatigue  []  Patient limited by pain   []  Patient limited by other medical complications  []  Other:     Assessment: Progressing towards goals nicely. Tolerated AAROM with dowel ervin well. GOALS:  Patient Goal: Be able to do most everything with my left arm. Short Term Goals:  Time Frame: 4 weeks  1. Be independent with HEP as instructed to increase patient's ability to complete normal household activities. 2.  Increase passive left shoulder flexion to 155, abduction to 110, and ER at side to 60 to increase her flexibility to complete normal daily tasks. 3.  Be able to use left hand to assist in washing hair without pain. 4.  Be able to use her left arm to load dishes into . 5.  Increase active left shoulder flexion to 125, abduction to 95, and ER at side to 50 to increase her ability to use left hand for hair care. Long Term Goals:  Time Frame: 9 weeks  1. Be able to use left hand to turn steering wheel without pain or difficulty. 2. Be able to use left hand to retrieve item from drive-thru or use SUKHI without difficulty. 3. Be able to complete hair care without pain in left UE. 4. Be able to retrieve casserole dish from oven using both UE without difficulty.      Patient Education: [x]  HEP/Education Completed: supine dowel for shoulder flexion and chest press  ? []  No new Education completed  [x]  Reviewed Prior HEP; reports AAROM is going well. [x]  Patient verbalized and/or demonstrated understanding of education provided. []  Patient unable to verbalize and/or demonstrate understanding of education provided. Will continue education. [x]  Barriers to learning: none    PLAN:      []  Plan of care initiated. [x]  Continue with current plan of care  []  Modify plan of care as follows: 2 x week x 9 weeks from 2/11/21   []  Hold pending physician visit  []  Discharge    Time In 0830   Time Out 0900       Timed Code Minutes: Minutes Units   ADL (41863)     Aquatics (16565)     Manual Therapy (06522)     Neuro (01012)     Th. Activities (36202)     Th. Exercise (04865) 30 2   Wheelchair Mgmt (48389)     Cognitive Function (1st 15 min  97674)     Cognitive Function (per sub.  15 min  35744)     Ultrasound (09226)     Ionto (79061)     Manual E-Stim (53386)          TOTAL TREATMENT TIME: 30 minutes       Viola Gorssman OTR/L #[id number]

## 2021-02-16 ENCOUNTER — APPOINTMENT (OUTPATIENT)
Dept: OCCUPATIONAL THERAPY | Age: 71
End: 2021-02-16
Payer: MEDICARE

## 2021-02-18 ENCOUNTER — HOSPITAL ENCOUNTER (OUTPATIENT)
Dept: OCCUPATIONAL THERAPY | Age: 71
Setting detail: THERAPIES SERIES
Discharge: HOME | End: 2021-02-18
Payer: MEDICARE

## 2021-02-18 PROCEDURE — 97110 THERAPEUTIC EXERCISES: CPT

## 2021-02-18 NOTE — PROGRESS NOTES
[address]  [] EVALUATION  [x] DAILY NOTE (LAND) [] DAILY NOTE (AQUATIC ) [] PROGRESS NOTE [] DISCHARGE NOTE    [] [address] 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]  Bicipital tendinitis, left shoulder [M75.22]    Treatment Diagnosis Left shoulder pain   Date of Evaluation 21      Functional Outcome Measure Used UEFS   Functional Outcome Score  (21)       Insurance: Primary: Payor: Elroy Henry /  /  / ,   Secondary:    Authorization Information: Unlimited, no massage   Visit # 12; 2/10 for PN; PN completed on 21   Visits Allowed: unlimited   Recertification Date:    Physician Follow-Up: 3/2/21   Physician Orders: eval and treat per total shoulder protocol   Pertinent History: Patient reports that she has had problems with left shoulder for a couple of years. Had right shoulder replacement 10/2018. Had left TKA on 2020. Reports that she had left TSA by Dr. Erwin Harris on 21. Patient returned earlier this week and received order for therapy at that time. Comorbidities include HTN, irregular heart beat, and arthritis that could influence rehab process. SUBJECTIVE: States that she is doing \"real good. \"     TREATMENT   Precautions: Dr. Erwin Harris TSA protocol   Pain:  1/10 LOCATION: left shoulder    X in shaded column indicates Activity Completed Today   Modalities Parameters/  Location  Notes/Comments                     Manual Therapy Time/  Technique  Notes/Comments   STM to left upper trapezius                  Exercises   Sets/  Sec Reps  Notes/Comments Supine PROM to left shoulder in all planes per physician protcol   x    Pendulums with left UE 1 10 each direction     Scapular retraction 1 10 x    Small backward shoulder circles 1 10 x    Pulleys for shoulder flexion 1 15 x    Supine self assist bench press  1 15     Supine self assist flexion to tolerance 1 15     Bilateral table slides for shoulder flexion 1 10     Supine dowel for shoulder flexion 1 15     Supine dowel for chest press 1 15     Submaximal isometrics with left UE - flexion, extension, IR, ER, abduction 1 10 each x    Activities Time    Notes/Comments                         Specific Interventions Next Treatment: advance per Dr. Crista Godinez protocol    Activity/Treatment Tolerance:  [x]  Patient tolerated treatment well  []  Patient limited by fatigue  []  Patient limited by pain   []  Patient limited by other medical complications  []  Other:     Assessment: Progressing towards goals nicely. Advancing on physician protocol as appropriate. GOALS:  Patient Goal: Be able to do most everything with my left arm. Short Term Goals:  Time Frame: 4 weeks  1. Be independent with HEP as instructed to increase patient's ability to complete normal household activities. 2.  Increase passive left shoulder flexion to 155, abduction to 110, and ER at side to 60 to increase her flexibility to complete normal daily tasks. 3.  Be able to use left hand to assist in washing hair without pain. 4.  Be able to use her left arm to load dishes into . 5.  Increase active left shoulder flexion to 125, abduction to 95, and ER at side to 50 to increase her ability to use left hand for hair care. Long Term Goals:  Time Frame: 9 weeks  1. Be able to use left hand to turn steering wheel without pain or difficulty. 2. Be able to use left hand to retrieve item from drive-thru or use SUKHI without difficulty. 3. Be able to complete hair care without pain in left UE. 4. Be able to retrieve casserole dish from oven using both UE without difficulty. Patient Education:   [x]  HEP/Education Completed: submaximal isometrics for left shoulder - flexion, extension, abduction, IR, and ER  ? []  No new Education completed  [x]  Reviewed Prior HEP; reports AAROM is going well. [x]  Patient verbalized and/or demonstrated understanding of education provided. []  Patient unable to verbalize and/or demonstrate understanding of education provided. Will continue education. [x]  Barriers to learning: none    PLAN:      []  Plan of care initiated. [x]  Continue with current plan of care  []  Modify plan of care as follows: 2 x week x 9 weeks from 2/11/21   []  Hold pending physician visit  []  Discharge    Time In 1115   Time Out 1145       Timed Code Minutes: Minutes Units   ADL (82772)     Aquatics (27382)     Manual Therapy (27784)     Neuro (08359)     Th. Activities (99096)     Th. Exercise (53477) 30 2   Wheelchair Mgmt (42900)     Cognitive Function (1st 15 min  69421)     Cognitive Function (per sub.  15 min  55385)     Ultrasound (05076)     Ionto (16326)     Manual E-Stim (61889)          TOTAL TREATMENT TIME: 30 minutes       RAVI John/DELROY #[id number]

## 2021-02-22 ENCOUNTER — HOSPITAL ENCOUNTER (OUTPATIENT)
Dept: OCCUPATIONAL THERAPY | Age: 71
Setting detail: THERAPIES SERIES
Discharge: HOME | End: 2021-02-22
Payer: MEDICARE

## 2021-02-22 PROCEDURE — 97110 THERAPEUTIC EXERCISES: CPT

## 2021-02-22 NOTE — PROGRESS NOTES
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]  Bicipital tendinitis, left shoulder [M75.22]    Treatment Diagnosis Left shoulder pain   Date of Evaluation 21      Functional Outcome Measure Used UEFS   Functional Outcome Score  (21)       Insurance: Primary: Payor: Elroy Henry /  /  / ,   Secondary:    Authorization Information: Unlimited, no massage   Visit # 13; 3/10 for PN; PN completed on 21   Visits Allowed: unlimited   Recertification Date: 14   Physician Follow-Up: 3/2/21   Physician Orders: eval and treat per total shoulder protocol   Pertinent History: Patient reports that she has had problems with left shoulder for a couple of years. Had right shoulder replacement 10/2018. Had left TKA on 2020. Reports that she had left TSA by Dr. Erwin Harris on 21. Patient returned earlier this week and received order for therapy at that time. Comorbidities include HTN, irregular heart beat, and arthritis that could influence rehab process. SUBJECTIVE: Patient is pleasant and cooperative, no complaints today.     TREATMENT   Precautions: Dr. Erwin Harris TSA protocol   Pain:  0/10 LOCATION: left shoulder    X in shaded column indicates Activity Completed Today   Modalities Parameters/  Location  Notes/Comments                     Manual Therapy Time/  Technique  Notes/Comments   STM to left upper trapezius                  Exercises   Sets/  Sec Reps  Notes/Comments Supine PROM to left shoulder in all planes per physician protcol   x    Pendulums with left UE 1 10 each direction     Scapular retraction 1 10 x    Small backward shoulder circles 1 10 x    Pulleys for shoulder flexion 1 15 x    Supine active chest press 1 10 x    Supine active flexion full arc of motion 1 10 x    Sidelying ER with elbow at side 1 10 x    Supine dowel adduction 1 10 x    Standing 1# bicep curl elbow at side 1 10 x    Submaximal isometrics with left UE - flexion, extension, IR, ER, abduction 1 10 each x    Activities Time    Notes/Comments                         Specific Interventions Next Treatment: advance per Dr. Manish Duran protocol    Activity/Treatment Tolerance:  [x]  Patient tolerated treatment well  []  Patient limited by fatigue  []  Patient limited by pain   []  Patient limited by other medical complications  []  Other:     Assessment: Progressing towards goals nicely. Advancing on physician protocol as appropriate. No complaints at end of session. GOALS:  Patient Goal: Be able to do most everything with my left arm. Short Term Goals:  Time Frame: 4 weeks  1. Be independent with HEP as instructed to increase patient's ability to complete normal household activities. 2.  Increase passive left shoulder flexion to 155, abduction to 110, and ER at side to 60 to increase her flexibility to complete normal daily tasks. 3.  Be able to use left hand to assist in washing hair without pain. 4.  Be able to use her left arm to load dishes into . 5.  Increase active left shoulder flexion to 125, abduction to 95, and ER at side to 50 to increase her ability to use left hand for hair care. Long Term Goals:  Time Frame: 9 weeks  1. Be able to use left hand to turn steering wheel without pain or difficulty. 2. Be able to use left hand to retrieve item from drive-thru or use SUKHI without difficulty. 3. Be able to complete hair care without pain in left UE. 4. Be able to retrieve casserole dish from oven using both UE without difficulty. Patient Education:   []  HEP/Education Completed: submaximal isometrics for left shoulder - flexion, extension, abduction, IR, and ER  ? []  No new Education completed  [x]  Reviewed Prior HEP; no additions to be completed at home. [x]  Patient verbalized and/or demonstrated understanding of education provided. []  Patient unable to verbalize and/or demonstrate understanding of education provided. Will continue education. [x]  Barriers to learning: none    PLAN:      []  Plan of care initiated. [x]  Continue with current plan of care  []  Modify plan of care as follows: 2 x week x 9 weeks from 2/11/21   []  Hold pending physician visit  []  Discharge    Time In 0800   Time Out 0830       Timed Code Minutes: Minutes Units   ADL (81293)     Aquatics (47202)     Manual Therapy (59405)     Neuro (53934)     Th. Activities (49127)     Th. Exercise (96949) 30 2   Wheelchair Mgmt (66578)     Cognitive Function (1st 15 min  76866)     Cognitive Function (per sub.  15 min  83574)     Ultrasound (49363)     Ionto (59512)     Manual E-Stim (78630)          TOTAL TREATMENT TIME: 30 minutes       ISABELLE COON/L #[id number]

## 2021-02-23 ENCOUNTER — HOSPITAL ENCOUNTER (OUTPATIENT)
Dept: OCCUPATIONAL THERAPY | Age: 71
Setting detail: THERAPIES SERIES
Discharge: HOME | End: 2021-02-23
Payer: MEDICARE

## 2021-02-23 PROCEDURE — 97110 THERAPEUTIC EXERCISES: CPT

## 2021-02-23 NOTE — PROGRESS NOTES
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]  Bicipital tendinitis, left shoulder [M75.22]    Treatment Diagnosis Left shoulder pain   Date of Evaluation 21      Functional Outcome Measure Used UEFS   Functional Outcome Score  (21)       Insurance: Primary: Payor: [address] [place] [address] /  /  / ,   Secondary:    Authorization Information: Unlimited, no massage   Visit # 14; 4/10 for PN; PN completed on 21   Visits Allowed: unlimited   Recertification Date: 02   Physician Follow-Up: 3/2/21   Physician Orders: eval and treat per total shoulder protocol   Pertinent History: Patient reports that she has had problems with left shoulder for a couple of years. Had right shoulder replacement 10/2018. Had left TKA on 2020. Reports that she had left TSA by Dr. Maura Suresh on 21. Patient returned earlier this week and received order for therapy at that time. Comorbidities include HTN, irregular heart beat, and arthritis that could influence rehab process. SUBJECTIVE: States that things are going well. States that she doesn't wear the sling much around the house.      TREATMENT   Precautions: Dr. Maura Suresh TSA protocol   Pain:  1/10 LOCATION: left shoulder    X in shaded column indicates Activity Completed Today   Modalities Parameters/  Location  Notes/Comments                     Manual Therapy Time/  Technique  Notes/Comments   STM to left upper trapezius                  Exercises   Sets/  Sec Reps  Notes/Comments Supine PROM to left shoulder in all planes per physician protcol       Pendulums with left UE 1 10 each direction     Bilateral table slides for shoulder flexion with patient feeling minimal stretch 1 10 x 5 second hold   Table slide for left shoulder abduction with thumb up and patient feeling minimal stretch 1 10 x 5 second hold   Scapular retraction 1 10 x    Small backward shoulder circles 1 10 x    Pulleys for shoulder flexion 1 15 x    Supine active chest press 1 10 x    Supine active flexion full arc of motion 1 10 x    Sidelying ER with elbow at side 1 10 x    Supine ceiling circles with left UE 1 10 in each direction x    Supine dowel adduction 1 10     Standing 1# bicep curl elbow at side 1 10     Submaximal isometrics with left UE - flexion, extension, IR, ER, abduction 1 10 each     Activities Time    Notes/Comments                         Specific Interventions Next Treatment: advance per Dr. Manav Mercedes protocol    Activity/Treatment Tolerance:  [x]  Patient tolerated treatment well  []  Patient limited by fatigue  []  Patient limited by pain   []  Patient limited by other medical complications  []  Other:     Assessment: Progressing towards goals nicely. Continues to tolerate protocol advancement well. GOALS:  Patient Goal: Be able to do most everything with my left arm. Short Term Goals:  Time Frame: 4 weeks  1. Be independent with HEP as instructed to increase patient's ability to complete normal household activities. 2.  Increase passive left shoulder flexion to 155, abduction to 110, and ER at side to 60 to increase her flexibility to complete normal daily tasks. 3.  Be able to use left hand to assist in washing hair without pain. 4.  Be able to use her left arm to load dishes into . 5.  Increase active left shoulder flexion to 125, abduction to 95, and ER at side to 50 to increase her ability to use left hand for hair care.   Long Term Goals:  Time Frame: 9 weeks 1. Be able to use left hand to turn steering wheel without pain or difficulty. 2. Be able to use left hand to retrieve item from drive-thru or use SUKHI without difficulty. 3. Be able to complete hair care without pain in left UE. 4. Be able to retrieve casserole dish from oven using both UE without difficulty. Patient Education:   [x]  HEP/Education Completed: sidelying ER, supine shoulder flexion, ceiling punches, and ceiling circles  []  No new Education completed  []  Reviewed Prior HEP; no additions to be completed at home. [x]  Patient verbalized and/or demonstrated understanding of education provided. []  Patient unable to verbalize and/or demonstrate understanding of education provided. Will continue education. [x]  Barriers to learning: none    PLAN:      []  Plan of care initiated. [x]  Continue with current plan of care  []  Modify plan of care as follows: 2 x week x 9 weeks from 2/11/21   []  Hold pending physician visit  []  Discharge    Time In 1005   Time Out 1030       Timed Code Minutes: Minutes Units   ADL (83654)     Aquatics (30158)     Manual Therapy (70472)     Neuro (40190)     Th. Activities (56985)     Th. Exercise (12309) 25 2   Wheelchair Mgmt (86578)     Cognitive Function (1st 15 min  09620)     Cognitive Function (per sub.  15 min  47210)     Ultrasound (61287)     Ionto (63266)     Manual E-Stim (15288)          TOTAL TREATMENT TIME: 25 minutes       Yashira Jeong OTR/DELROY #[id number]

## 2021-02-25 ENCOUNTER — HOSPITAL ENCOUNTER (OUTPATIENT)
Dept: OCCUPATIONAL THERAPY | Age: 71
Setting detail: THERAPIES SERIES
Discharge: HOME | End: 2021-02-25
Payer: MEDICARE

## 2021-02-25 PROCEDURE — 97110 THERAPEUTIC EXERCISES: CPT

## 2021-02-25 NOTE — PROGRESS NOTES
[address]  [] EVALUATION  [x] DAILY NOTE (LAND) [] DAILY NOTE (AQUATIC ) [] PROGRESS NOTE [] DISCHARGE NOTE    [] [address] [facility]   [x] Leonid [address]    [] 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]  Bicipital tendinitis, left shoulder [M75.22]    Treatment Diagnosis Left shoulder pain   Date of Evaluation 21      Functional Outcome Measure Used UEFS   Functional Outcome Score  (21)       Insurance: Primary: Payor: Raisa Prathers /  /  / ,   Secondary:    Authorization Information: Unlimited, no massage   Visit # 15; 5/10 for PN; PN completed on 21   Visits Allowed: unlimited   Recertification Date: 3/73/30   Physician Follow-Up: 3/2/21   Physician Orders: eval and treat per total shoulder protocol   Pertinent History: Patient reports that she has had problems with left shoulder for a couple of years. Had right shoulder replacement 10/2018. Had left TKA on 2020. Reports that she had left TSA by Dr. Berhane Hameed on 21. Patient returned earlier this week and received order for therapy at that time. Comorbidities include HTN, irregular heart beat, and arthritis that could influence rehab process. SUBJECTIVE: States that things are going well. Does relates that she does have some tenderness in the distal end of her incision and a stitch did surface yesterday.       TREATMENT   Precautions: Dr. Berhane Hameed TSA protocol   Pain:  1/10 LOCATION: left shoulder    X in shaded column indicates Activity Completed Today   Modalities Parameters/  Location  Notes/Comments                     Manual Therapy Time/  Technique  Notes/Comments   STM to left upper trapezius  x Exercises   Sets/  Sec Reps  Notes/Comments   Supine PROM to left shoulder in all planes per physician protcol   x    Pendulums with left UE 1 10 each direction     Bilateral table slides for shoulder flexion with patient feeling minimal stretch 1 10 x 5 second hold   Table slide for left shoulder abduction with thumb up and patient feeling minimal stretch 1 10 x 5 second hold   Scapular retraction 1 10     Small backward shoulder circles 1 10     Pulleys for shoulder flexion 1 15 x    Supine active chest press 1 10     Supine active flexion full arc of motion 1 10     Sidelying ER with elbow at side 1 10     Supine ceiling circles with left UE 1 10 in each direction     Supine dowel adduction 1 10     Standing 1# bicep curl elbow at side 1 10     Submaximal isometrics with left UE - flexion, extension, IR, ER, abduction 1 10 each     Activities Time    Notes/Comments                         Specific Interventions Next Treatment: advance per Dr. Mathews Doctor protocol    Activity/Treatment Tolerance:  [x]  Patient tolerated treatment well  []  Patient limited by fatigue  []  Patient limited by pain   []  Patient limited by other medical complications  []  Other:     Assessment: Progressing toward goals nicely. Will continue to advance per protocol as appropriate. GOALS:  Patient Goal: Be able to do most everything with my left arm. Short Term Goals:  Time Frame: 4 weeks  1. Be independent with HEP as instructed to increase patient's ability to complete normal household activities. 2.  Increase passive left shoulder flexion to 155, abduction to 110, and ER at side to 60 to increase her flexibility to complete normal daily tasks. 3.  Be able to use left hand to assist in washing hair without pain. 4.  Be able to use her left arm to load dishes into . 5.  Increase active left shoulder flexion to 125, abduction to 95, and ER at side to 50 to increase her ability to use left hand for hair care. Long Term Goals:  Time Frame: 9 weeks  1. Be able to use left hand to turn steering wheel without pain or difficulty. 2. Be able to use left hand to retrieve item from drive-thru or use SUKHI without difficulty. 3. Be able to complete hair care without pain in left UE. 4. Be able to retrieve casserole dish from oven using both UE without difficulty. Patient Education:   []  HEP/Education Completed:   []  No new Education completed  []  Reviewed Prior HEP; no additions to be completed at home. [x]  Patient verbalized and/or demonstrated understanding of education provided. []  Patient unable to verbalize and/or demonstrate understanding of education provided. Will continue education. [x]  Barriers to learning: none    PLAN:      []  Plan of care initiated. [x]  Continue with current plan of care  []  Modify plan of care as follows: 2 x week x 9 weeks from 2/11/21   []  Hold pending physician visit  []  Discharge    Time In 1005   Time Out 1030       Timed Code Minutes: Minutes Units   ADL (44410)     Aquatics (20089)     Manual Therapy (87667)     Neuro (98176)     Th. Activities (77608)     Th. Exercise (18845) 25 2   Wheelchair Mgmt (25356)     Cognitive Function (1st 15 min  72227)     Cognitive Function (per sub.  15 min  38443)     Ultrasound (31647)     Ionto (48539)     Manual E-Stim (16959)          TOTAL TREATMENT TIME: 25 minutes       RAVI [place]/DELROY #[id number]

## 2021-03-01 ENCOUNTER — HOSPITAL ENCOUNTER (OUTPATIENT)
Dept: OCCUPATIONAL THERAPY | Age: 71
Setting detail: THERAPIES SERIES
Discharge: HOME | End: 2021-03-01
Payer: MEDICARE

## 2021-03-01 PROCEDURE — 97110 THERAPEUTIC EXERCISES: CPT

## 2021-03-01 NOTE — PROGRESS NOTES
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]  Bicipital tendinitis, left shoulder [M75.22]    Treatment Diagnosis Left shoulder pain   Date of Evaluation 21      Functional Outcome Measure Used UEFS   Functional Outcome Score  (21)       Insurance: Primary: Payor: Destinee Macias /  /  / ,   Secondary:    Authorization Information: Unlimited, no massage   Visit # 16; 6/10 for PN; PN completed on 21   Visits Allowed: unlimited   Recertification Date: 3/25/41   Physician Follow-Up: 3/2/21   Physician Orders: eval and treat per total shoulder protocol   Pertinent History: Patient reports that she has had problems with left shoulder for a couple of years. Had right shoulder replacement 10/2018. Had left TKA on 2020. Reports that she had left TSA by Dr. Ana Tapia on 21. Patient returned earlier this week and received order for therapy at that time. Comorbidities include HTN, irregular heart beat, and arthritis that could influence rehab process. SUBJECTIVE: States that her arm is doing really good.      TREATMENT   Precautions: Dr. Ana Tapia TSA protocol   Pain:  1/10 LOCATION: left shoulder    X in shaded column indicates Activity Completed Today   Modalities Parameters/  Location  Notes/Comments                     xManual Therapy Time/  Technique  Notes/Comments   STM to left upper trapezius  x                Exercises   Sets/  Sec Reps  Notes/Comments Supine PROM to left shoulder in all planes per physician protcol   x    Pendulums with left UE 1 10 each direction     Bilateral table slides for shoulder flexion with patient feeling minimal stretch 1 10 x    Table slide for left shoulder abduction with thumb up and patient feeling minimal stretch 1 10 x    Bilateral shoulder flexion in supine 1 10 x    Scapular retraction 1 10     Small backward shoulder circles 1 10     Pulleys for shoulder flexion 1 15 x    Supine active chest press 1 10     Supine active flexion full arc of motion 1 10     Sidelying ER with elbow at side 1 10     Supine ceiling circles with left UE 1 10 in each direction     Supine dowel adduction 1 10     Standing 1# bicep curl elbow at side 1 10     Submaximal isometrics with left UE - flexion, extension, IR, ER, abduction 1 10 each     Activities Time    Notes/Comments                         Specific Interventions Next Treatment: advance per Dr. Toy Booker protocol    Activity/Treatment Tolerance:  [x]  Patient tolerated treatment well  []  Patient limited by fatigue  []  Patient limited by pain   []  Patient limited by other medical complications  []  Other:     Assessment: Progressing toward goals nicely. GOALS:  Patient Goal: Be able to do most everything with my left arm. Short Term Goals:  Time Frame: 4 weeks  1. Be independent with HEP as instructed to increase patient's ability to complete normal household activities. 2.  Increase passive left shoulder flexion to 155, abduction to 110, and ER at side to 60 to increase her flexibility to complete normal daily tasks. 3.  Be able to use left hand to assist in washing hair without pain. 4.  Be able to use her left arm to load dishes into . 5.  Increase active left shoulder flexion to 125, abduction to 95, and ER at side to 50 to increase her ability to use left hand for hair care.   Long Term Goals:  Time Frame: 9 weeks 1. Be able to use left hand to turn steering wheel without pain or difficulty. 2. Be able to use left hand to retrieve item from drive-thru or use SUKHI without difficulty. 3. Be able to complete hair care without pain in left UE. 4. Be able to retrieve casserole dish from oven using both UE without difficulty. Patient Education:   []  HEP/Education Completed:   []  No new Education completed  []  Reviewed Prior HEP; no additions to be completed at home. [x]  Patient verbalized and/or demonstrated understanding of education provided. []  Patient unable to verbalize and/or demonstrate understanding of education provided. Will continue education. [x]  Barriers to learning: none    PLAN:      []  Plan of care initiated. [x]  Continue with current plan of care  []  Modify plan of care as follows: 2 x week x 9 weeks from 2/11/21   []  Hold pending physician visit  []  Discharge    Time In 0830   Time Out 0900       Timed Code Minutes: Minutes Units   ADL (25692)     Aquatics (43324)     Manual Therapy (82054)     Neuro (40459)     Th. Activities (52103)     Th. Exercise (60335) 30 2   Wheelchair Mgmt (94910)     Cognitive Function (1st 15 min  36391)     Cognitive Function (per sub.  15 min  14017)     Ultrasound (60887)     Ionto (47798)     Manual E-Stim (70474)          TOTAL TREATMENT TIME: 30 minutes       Erick Retana OTR/DELROY #[id number]

## 2021-03-02 ENCOUNTER — APPOINTMENT (OUTPATIENT)
Dept: OCCUPATIONAL THERAPY | Age: 71
End: 2021-03-02
Payer: MEDICARE

## 2021-03-04 ENCOUNTER — HOSPITAL ENCOUNTER (OUTPATIENT)
Dept: OCCUPATIONAL THERAPY | Age: 71
Setting detail: THERAPIES SERIES
Discharge: HOME | End: 2021-03-04
Payer: MEDICARE

## 2021-03-04 PROCEDURE — 97110 THERAPEUTIC EXERCISES: CPT

## 2021-03-04 NOTE — PROGRESS NOTES
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]  Bicipital tendinitis, left shoulder [M75.22]    Treatment Diagnosis Left shoulder pain   Date of Evaluation 21      Functional Outcome Measure Used UEFS   Functional Outcome Score  (21)       Insurance: Primary: Payor: [employer] /  /  / ,   Secondary:    Authorization Information: Unlimited, no massage   Visit # 16; 6/10 for PN; PN completed on 21   Visits Allowed: unlimited   Recertification Date: 60   Physician Follow-Up: 21   Physician Orders: eval and treat per total shoulder protocol; script of 3/2 for strengthening, scapular stabilizers   Pertinent History: Patient reports that she has had problems with left shoulder for a couple of years. Had right shoulder replacement 10/2018. Had left TKA on 2020. Reports that she had left TSA by Dr. Anju Yoo on 21. Patient returned earlier this week and received order for therapy at that time. Comorbidities include HTN, irregular heart beat, and arthritis that could influence rehab process. SUBJECTIVE: States that her physician told her that she is Hominy of schedule. \" States that he was very pleased with her progress.       TREATMENT   Precautions: Dr. Anju Yoo TSA protocol   Pain:  1/10 LOCATION: left shoulder    X in shaded column indicates Activity Completed Today   Modalities Parameters/  Location  Notes/Comments                     xManual Therapy Time/  Technique  Notes/Comments STM to left upper trapezius                  Exercises   Sets/  Sec Reps  Notes/Comments   Supine PROM to left shoulder in all planes per physician protcol       Seated saeboglide for left shoulder flexion 1 15 x    Seated saeboglide for left shoulder scaption 1 15 x    Table slide for left shoulder abduction with thumb up and patient feeling minimal stretch 1 10     Supine PREs with 1# in left UE - shoulder flexion, ceiling punches, ceiling circles 1 10 each x    Scapular retraction 1 10     Small backward shoulder circles 1 10     Pulleys for shoulder flexion 1 15 x    Supine active chest press 1 10     Supine active left shoulder horizontal abduction/adduction 1 10 x    Sidelying ER with elbow at side 1 10     biodex at 90 speed x 3 minutes forward and 3 minutes backward   x                  Submaximal isometrics with left UE - flexion, extension, IR, ER, abduction 1 10 each     Activities Time    Notes/Comments                         Specific Interventions Next Treatment: advance per Dr. Mathews Doctor protocol; strengthening, scapular stabilizers    Activity/Treatment Tolerance:  [x]  Patient tolerated treatment well  []  Patient limited by fatigue  []  Patient limited by pain   []  Patient limited by other medical complications  []  Other:     Assessment: Progressing toward goals nicely and tolerated initiation of strengthening well. GOALS:  Patient Goal: Be able to do most everything with my left arm. Short Term Goals:  Time Frame: 4 weeks  1. Be independent with HEP as instructed to increase patient's ability to complete normal household activities. 2.  Increase passive left shoulder flexion to 155, abduction to 110, and ER at side to 60 to increase her flexibility to complete normal daily tasks. 3.  Be able to use left hand to assist in washing hair without pain. 4.  Be able to use her left arm to load dishes into . 5.  Increase active left shoulder flexion to 125, abduction to 95, and ER at side to 50 to increase her ability to use left hand for hair care. Long Term Goals:  Time Frame: 9 weeks  1. Be able to use left hand to turn steering wheel without pain or difficulty. 2. Be able to use left hand to retrieve item from drive-thru or use SUKHI without difficulty. 3. Be able to complete hair care without pain in left UE. 4. Be able to retrieve casserole dish from oven using both UE without difficulty. Patient Education:   [x]  HEP/Education Completed: to add 1# for supine shoulder flexion, ceiling punches, and ceiling circles  []  No new Education completed  []  Reviewed Prior HEP; no additions to be completed at home. [x]  Patient verbalized and/or demonstrated understanding of education provided. []  Patient unable to verbalize and/or demonstrate understanding of education provided. Will continue education. [x]  Barriers to learning: none    PLAN:      []  Plan of care initiated. [x]  Continue with current plan of care  []  Modify plan of care as follows: 2 x week x 9 weeks from 2/11/21   []  Hold pending physician visit  []  Discharge    Time In 0930   Time Out 1000       Timed Code Minutes: Minutes Units   ADL (83575)     Aquatics (96797)     Manual Therapy (04018)     Neuro (19743)     Th. Activities (81958)     Th. Exercise (99304) 30 2   Wheelchair Mgmt (82549)     Cognitive Function (1st 15 min  67986)     Cognitive Function (per sub.  15 min  26267)     Ultrasound (57812)     Ionto (47410)     Manual E-Stim (85220)          TOTAL TREATMENT TIME: 30 minutes       Martha Nicole OTR/DELROY #[id number]

## 2021-03-09 ENCOUNTER — HOSPITAL ENCOUNTER (OUTPATIENT)
Dept: OCCUPATIONAL THERAPY | Age: 71
Setting detail: THERAPIES SERIES
Discharge: HOME | End: 2021-03-09
Payer: MEDICARE

## 2021-03-09 PROCEDURE — 97110 THERAPEUTIC EXERCISES: CPT

## 2021-03-09 NOTE — PROGRESS NOTES
PROM to left shoulder in all planes per physician protcol       Seated saeboglide for left shoulder flexion 1 15 x    Seated saeboglide for left shoulder scaption 1 15 x    Table slide for left shoulder abduction with thumb up and patient feeling minimal stretch 1 10     Supine PREs with 1# in left UE - shoulder flexion, ceiling punches, ceiling circles 1 10 each x                  Pulleys for shoulder flexion 1 15 x           Supine active left shoulder horizontal abduction/adduction with 1# 1 10 x    Sidelying ER with elbow at side 1 10     biodex at120 speed x 3 minutes forward and 3 minutes backward   x    Reaching activity of placing and removing clothespins from vertical post using left UE   x           Submaximal isometrics with left UE - flexion, extension, IR, ER, abduction 1 10 each     Activities Time    Notes/Comments                         Specific Interventions Next Treatment: advance per Dr. Barbara Delgadillo protocol; strengthening, scapular stabilizers    Activity/Treatment Tolerance:  [x]  Patient tolerated treatment well  []  Patient limited by fatigue  []  Patient limited by pain   []  Patient limited by other medical complications  []  Other:     Assessment: Patient is progressing toward goals nicely and is tolerating upgrades well. GOALS:  Patient Goal: Be able to do most everything with my left arm. Short Term Goals:  Time Frame: 4 weeks  1. Be independent with HEP as instructed to increase patient's ability to complete normal household activities. 2.  Increase passive left shoulder flexion to 155, abduction to 110, and ER at side to 60 to increase her flexibility to complete normal daily tasks. 3.  Be able to use left hand to assist in washing hair without pain. 4.  Be able to use her left arm to load dishes into . 5.  Increase active left shoulder flexion to 125, abduction to 95, and ER at side to 50 to increase her ability to use left hand for hair care.   Long Term Goals: Time Frame: 9 weeks  1. Be able to use left hand to turn steering wheel without pain or difficulty. 2. Be able to use left hand to retrieve item from drive-thru or use SUKHI without difficulty. 3. Be able to complete hair care without pain in left UE. 4. Be able to retrieve casserole dish from oven using both UE without difficulty. Patient Education:   [x]  HEP/Education Completed: to add 1# for supine shoulder horizontal abduction/adduction as able  []  No new Education completed  []  Reviewed Prior HEP; no additions to be completed at home. [x]  Patient verbalized and/or demonstrated understanding of education provided. []  Patient unable to verbalize and/or demonstrate understanding of education provided. Will continue education. [x]  Barriers to learning: none    PLAN:      []  Plan of care initiated. [x]  Continue with current plan of care  []  Modify plan of care as follows: 2 x week x 9 weeks from 2/11/21   []  Hold pending physician visit  []  Discharge    Time In 0905   Time Out 0930       Timed Code Minutes: Minutes Units   ADL (92756)     Aquatics (90837)     Manual Therapy (52871)     Neuro (35266)     Th. Activities (46251)     Th. Exercise (11470) 25 2   Wheelchair Mgmt (39172)     Cognitive Function (1st 15 min  58081)     Cognitive Function (per sub.  15 min  59402)     Ultrasound (99082)     Ionto (07586)     Manual E-Stim (89767)          TOTAL TREATMENT TIME: 25 minutes       RAVI Mcbride/DELROY #[id number]

## 2021-03-11 ENCOUNTER — HOSPITAL ENCOUNTER (OUTPATIENT)
Dept: OCCUPATIONAL THERAPY | Age: 71
Setting detail: THERAPIES SERIES
Discharge: HOME | End: 2021-03-11
Payer: MEDICARE

## 2021-03-11 PROCEDURE — 97110 THERAPEUTIC EXERCISES: CPT

## 2021-03-11 NOTE — PROGRESS NOTES
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]  Bicipital tendinitis, left shoulder [M75.22]    Treatment Diagnosis Left shoulder pain   Date of Evaluation 21      Functional Outcome Measure Used UEFS   Functional Outcome Score  (21)       Insurance: Primary: Payor: Jaylene Doe /  /  / ,   Secondary:    Authorization Information: Unlimited, no massage   Visit # 18; 8/10 for PN; PN completed on 21   Visits Allowed: unlimited   Recertification Date:    Physician Follow-Up: 21   Physician Orders: eval and treat per total shoulder protocol; script of 3/2 for strengthening, scapular stabilizers   Pertinent History: Patient reports that she has had problems with left shoulder for a couple of years. Had right shoulder replacement 10/2018. Had left TKA on 2020. Reports that she had left TSA by Dr. Dalrene Calderon on 21. Patient returned earlier this week and received order for therapy at that time. Comorbidities include HTN, irregular heart beat, and arthritis that could influence rehab process. SUBJECTIVE: States that she is able to do 7-8 reps of the horizontal abduction/adduction with 1# in left UE.     TREATMENT   Precautions: Dr. Darlene Calderon TSA protocol   Pain:  No pain at time of therapy    X in shaded column indicates Activity Completed Today   Modalities Parameters/  Location  Notes/Comments                     xManual Therapy Time/  Technique  Notes/Comments   STM to left upper trapezius 50 to increase her ability to use left hand for hair care. Long Term Goals:  Time Frame: 9 weeks  1. Be able to use left hand to turn steering wheel without pain or difficulty. 2. Be able to use left hand to retrieve item from drive-thru or use SUKHI without difficulty. 3. Be able to complete hair care without pain in left UE. 4. Be able to retrieve casserole dish from oven using both UE without difficulty. Patient Education:   [x]  HEP/Education Completed: supine peach theraband chest pull aparts  []  No new Education completed  []  Reviewed Prior HEP;   [x]  Patient verbalized and/or demonstrated understanding of education provided. []  Patient unable to verbalize and/or demonstrate understanding of education provided. Will continue education. [x]  Barriers to learning: none    PLAN:      []  Plan of care initiated. [x]  Continue with current plan of care  []  Modify plan of care as follows: 2 x week x 9 weeks from 2/11/21   []  Hold pending physician visit  []  Discharge    Time In 0830   Time Out 0900       Timed Code Minutes: Minutes Units   ADL (58426)     Aquatics (30956)     Manual Therapy (90634)     Neuro (44890)     Th. Activities (06012)     Th. Exercise (77459) 30 2   Wheelchair Mgmt (71080)     Cognitive Function (1st 15 min  36126)     Cognitive Function (per sub.  15 min  38073)     Ultrasound (97478)     Ionto (58782)     Manual E-Stim (69275)          TOTAL TREATMENT TIME: 30 minutes       RAVI Lopez/DELROY #[id number]

## 2021-03-16 ENCOUNTER — HOSPITAL ENCOUNTER (OUTPATIENT)
Dept: OCCUPATIONAL THERAPY | Age: 71
Setting detail: THERAPIES SERIES
Discharge: HOME | End: 2021-03-16
Payer: MEDICARE

## 2021-03-16 PROCEDURE — 97110 THERAPEUTIC EXERCISES: CPT
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
## 2021-03-18 ENCOUNTER — HOSPITAL ENCOUNTER (OUTPATIENT)
Dept: OCCUPATIONAL THERAPY | Age: 71
Setting detail: THERAPIES SERIES
Discharge: HOME | End: 2021-03-18
Payer: MEDICARE

## 2021-03-18 PROCEDURE — 97110 THERAPEUTIC EXERCISES: CPT

## 2021-03-18 NOTE — PROGRESS NOTES
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]  Bicipital tendinitis, left shoulder [M75.22]    Treatment Diagnosis Left shoulder pain   Date of Evaluation 21      Functional Outcome Measure Used UEFS   Functional Outcome Score  (21)       Insurance: Primary: Payor: Belvin Serve /  /  / ,   Secondary:    Authorization Information: Unlimited, no massage   Visit # 20; ; PN completed on 3/18/21   Visits Allowed: unlimited   Recertification Date:    Physician Follow-Up: 21   Physician Orders: eval and treat per total shoulder protocol; script of 3/2 for strengthening, scapular stabilizers   Pertinent History: Patient reports that she has had problems with left shoulder for a couple of years. Had right shoulder replacement 10/2018. Had left TKA on 2020. Reports that she had left TSA by Dr. Mohsen Caraballo on 21. Patient returned earlier this week and received order for therapy at that time. Comorbidities include HTN, irregular heart beat, and arthritis that could influence rehab process. SUBJECTIVE: States that she feels as though she is doing well. States that she was able to do some work in the garden yesterday and didn't have any issues.     TREATMENT   Precautions: Dr. Mohsen Caraballo TSA protocol   Pain:  No pain at time of therapy    X in shaded column indicates Activity Completed Today   Modalities Parameters/  Location  Notes/Comments                     xManual Therapy Time/  Technique  Notes/Comments   STM to house and in yard. Patient does continue to demonstrate compensatory motion of shoulder elevation with active shoulder abduction and ER. Skilled therapy services are required to address AROM and strengthening of left shoulder to allow patient to complete her normal daily tasks. GOALS:  Patient Goal: Be able to do most everything with my left arm. Short Term Goals:  Time Frame: STG NOT FORMULATED DUE TO SHORT LOS - SEE LTG  1. Be independent with HEP as instructed to increase patient's ability to complete normal household activities. GOAL MET - GOAL DISCONTINUED - SEE DALTONOS  2. Increase passive left shoulder flexion to 155, abduction to 110, and ER at side to 60 to increase her flexibility to complete normal daily tasks. GOAL MET - SEE ABOVE FOR DETAILS - GOAL DISCONTINUED   3. Be able to use left hand to assist in washing hair without pain. GOAL MET - GOAL DISCONTINUED  4. Be able to use her left arm to load dishes into . GOAL MET - GOAL DISCONTINUED  5. Increase active left shoulder flexion to 125, abduction to 95, and ER at side to 50 to increase her ability to use left hand for hair care. GOAL MET - SEE ABOVE FOR DETAILS - GOAL DISCONTINUED - SEE BELOW FOR LTG  Long Term Goals:  Time Frame: 4 weeks  1. Be able to use left hand to turn steering wheel without pain or difficulty. GOAL NOT MET - CONTINUE GOAL  2. Be able to use left hand to retrieve item from drive-thru or use SUKHI without difficulty. GOAL NOT MET - CONTINUE GOAL   3. Be able to complete hair care without pain in left UE. GOAL NOT MET - RELATES SOME DIFFICULTY WITH STYLING HAIR - CONTINUE GOAL  4. Be able to retrieve casserole dish from oven using both UE without difficulty. GOAL MET - REVISED GOAL: Be independent with HEP as instructed to increase her ability to complete normal tasks. 5.  GOAL INITIATED: Increase active left shoulder flexion to 160, abduction to 160, ER to 80, and get left thumb 5\" above waistband behind back to increase her ability to complete overhead kitchen task. Patient Education:   [x]  HEP/Education Completed: goal status  []  No new Education completed  []  Reviewed Prior HEP;   [x]  Patient verbalized and/or demonstrated understanding of education provided. []  Patient unable to verbalize and/or demonstrate understanding of education provided. Will continue education. [x]  Barriers to learning: none    PLAN:      []  Plan of care initiated. []  Continue with current plan of care  [x]  Modify plan of care as follows: 2 x week x 4 weeks from 3/18/21   []  Hold pending physician visit  []  Discharge    Time In 0830   Time Out 0900       Timed Code Minutes: Minutes Units   ADL (61072)     Aquatics (48919)     Manual Therapy (06649)     Neuro (55137)     Th. Activities (76244)     Th. Exercise (98808) 30 2   Wheelchair Mgmt (09259)     Cognitive Function (1st 15 min  37445)     Cognitive Function (per sub.  15 min  14375)     Ultrasound (69005)     Ionto (58641)     Manual E-Stim (18936)          TOTAL TREATMENT TIME: 30 minutes       Martha Nicole OTR/DELROY #[id number]

## 2021-03-23 ENCOUNTER — HOSPITAL ENCOUNTER (OUTPATIENT)
Dept: OCCUPATIONAL THERAPY | Age: 71
Setting detail: THERAPIES SERIES
Discharge: HOME | End: 2021-03-23
Payer: MEDICARE

## 2021-03-23 PROCEDURE — 97110 THERAPEUTIC EXERCISES: CPT

## 2021-03-23 NOTE — PROGRESS NOTES
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]  Bicipital tendinitis, left shoulder [M75.22]    Treatment Diagnosis Left shoulder pain   Date of Evaluation 21      Functional Outcome Measure Used UEFS   Functional Outcome Score  (21)       Insurance: Primary: Payor: ShopSpotjg Hernandez /  /  / ,   Secondary:    Authorization Information: Unlimited, no massage   Visit # 21; 1/0 for PN; PN completed on 3/18/21   Visits Allowed: unlimited   Recertification Date: 67   Physician Follow-Up: 21   Physician Orders: eval and treat per total shoulder protocol; script of 3/2 for strengthening, scapular stabilizers   Pertinent History: Patient reports that she has had problems with left shoulder for a couple of years. Had right shoulder replacement 10/2018. Had left TKA on 2020. Reports that she had left TSA by Dr. Anju Yoo on 21. Patient returned earlier this week and received order for therapy at that time. Comorbidities include HTN, irregular heart beat, and arthritis that could influence rehab process. SUBJECTIVE: Patient relates that she is doing well. States that she was gardening a lot the last few days and only had to rest a few times.     TREATMENT   Precautions: Dr. Anju Yoo TSA protocol   Pain:  No pain at time of therapy    X in shaded column indicates Activity Completed Today   Modalities Parameters/  Location  Notes/Comments                     xManual Therapy Time/  Technique  Notes/Comments   STM to left upper trapezius                  Exercises   Sets/  Sec Reps  Notes/Comments   Supine PROM to left shoulder in all planes        Seated saeboglide for left shoulder flexion 1 15     Seated saeboglide for left shoulder scaption 1 15     Table slide for left shoulder abduction with thumb up and patient feeling minimal stretch 1 10     Supine PREs with 1# in left UE - shoulder flexion, ceiling punches, ceiling circles 1 10 each x    Supine orange theraband - chest pull aparts 1 10            Pulleys for shoulder flexion 1 15 x           Supine active left shoulder horizontal abduction/adduction with 1# 1 10 x    Sidelying ER with elbow at side 1 10     biodex at 90 speed x 3 minutes forward and 3 minutes backward   x    Reaching activity of placing and removing clothespins from vertical post using left UE   x    Orange theraband - marcello with left UE 1 15 each direction x    Orange theraband - supine riivalid  1 15 in each direction x    Submaximal isometrics with left UE - flexion, extension, IR, ER, abduction 1 10 each     Activities Time    Notes/Comments                         Specific Interventions Next Treatment: advance per Dr. Joel Gamble protocol; strengthening, scapular stabilizers    Activity/Treatment Tolerance:  [x]  Patient tolerated treatment well  []  Patient limited by fatigue  []  Patient limited by pain   []  Patient limited by other medical complications  []  Other:     Assessment: Patient progressing toward goals nicely. GOALS:  Patient Goal: Be able to do most everything with my left arm. Short Term Goals:  Time Frame: STG NOT FORMULATED DUE TO SHORT LOS - SEE LTG    Long Term Goals:  Time Frame: 4 weeks  1. Be able to use left hand to turn steering wheel without pain or difficulty. 2. Be able to use left hand to retrieve item from drive-thru or use SUKHI without difficulty. 3. Be able to complete hair care without pain in left UE.    4.  Be independent with HEP as instructed to increase her ability to complete normal tasks. 5.  Increase active left shoulder flexion to 160, abduction to 160, ER to 80, and get left thumb 5\" above waistband behind back to increase her ability to complete overhead kitchen task. Patient Education:   [x]  HEP/Education Completed: supine riivalid with orange theraband  []  No new Education completed  []  Reviewed Prior HEP;   [x]  Patient verbalized and/or demonstrated understanding of education provided. []  Patient unable to verbalize and/or demonstrate understanding of education provided. Will continue education. [x]  Barriers to learning: none    PLAN:      []  Plan of care initiated. [x]  Continue with current plan of care  []  Modify plan of care as follows: 2 x week x 4 weeks from 3/18/21   []  Hold pending physician visit  []  Discharge    Time In 1000   Time Out 1030       Timed Code Minutes: Minutes Units   ADL (74309)     Aquatics (63094)     Manual Therapy (29733)     Neuro (55527)     Th. Activities (36113)     Th. Exercise (54791) 30 2   Wheelchair Mgmt (29626)     Cognitive Function (1st 15 min  15341)     Cognitive Function (per sub.  15 min  90232)     Ultrasound (91720)     Ionto (93023)     Manual E-Stim (94003)          TOTAL TREATMENT TIME: 30 minutes       Deuce Brooks OTR/L #[id number]

## 2021-03-25 ENCOUNTER — HOSPITAL ENCOUNTER (OUTPATIENT)
Dept: OCCUPATIONAL THERAPY | Age: 71
Setting detail: THERAPIES SERIES
Discharge: HOME | End: 2021-03-25
Payer: MEDICARE

## 2021-03-25 PROCEDURE — 97110 THERAPEUTIC EXERCISES: CPT

## 2021-03-25 NOTE — PROGRESS NOTES
[address]  [] EVALUATION  [x] DAILY NOTE (LAND) [] DAILY NOTE (AQUATIC ) [] PROGRESS NOTE [] DISCHARGE NOTE    [] [address] [facility]   [x] [address]    [] 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]  Bicipital tendinitis, left shoulder [M75.22]    Treatment Diagnosis Left shoulder pain   Date of Evaluation 21      Functional Outcome Measure Used UEFS   Functional Outcome Score  (21)       Insurance: Primary: Payor: Chapin Sky /  /  / ,   Secondary:    Authorization Information: Unlimited, no massage   Visit # 22; 2/0 for PN; PN completed on 3/18/21   Visits Allowed: unlimited   Recertification Date:    Physician Follow-Up: 21   Physician Orders: eval and treat per total shoulder protocol; script of 32 for strengthening, scapular stabilizers   Pertinent History: Patient reports that she has had problems with left shoulder for a couple of years. Had right shoulder replacement 10/2018. Had left TKA on 2020. Reports that she had left TSA by Dr. Tye Nettles on 21. Patient returned earlier this week and received order for therapy at that time. Comorbidities include HTN, irregular heart beat, and arthritis that could influence rehab process. SUBJECTIVE: States that she had some aching yesterday, but doesn't have anything today.      TREATMENT   Precautions: Dr. Tye Nettles TSA protocol   Pain:  No pain at time of therapy    X in shaded column indicates Activity Completed Today   Modalities Parameters/  Location  Notes/Comments                     xManual Therapy Time/  Technique  Notes/Comments   STM to left upper trapezius                  Exercises   Sets/ Sec Reps  Notes/Comments   Supine PROM to left shoulder in all planes        Seated saeboglide for left shoulder flexion 1 15     Seated saeboglide for left shoulder scaption 1 15     Table slide for left shoulder abduction with thumb up and patient feeling minimal stretch 1 10     Supine PREs with 2# in left UE - shoulder flexion, ceiling punches, ceiling circles 1 10 each x    Supine orange theraband - chest pull aparts 1 10            Pulleys for shoulder flexion 1 15 x    Wall slide with left UE and removal of hand from wall 1 10 x 5 second hold   Supine active left shoulder horizontal abduction/adduction with 1# 1 10 x    Sidelying ER with elbow at side 1 10     biodex at 80 speed x 3 minutes forward and 3 minutes backward   x    Reaching activity of placing and removing clothespins from vertical post using left UE   x    Orange theraband - marcello with left UE 1 15 each direction x    Orange theraband - waist pull aparts 1 15 x    Orange theraband - supine riivalid  1 15 in each direction     Submaximal isometrics with left UE - flexion, extension, IR, ER, abduction 1 10 each     Activities Time    Notes/Comments                         Specific Interventions Next Treatment: advance per Dr. Gilma Morales protocol; strengthening, scapular stabilizers    Activity/Treatment Tolerance:  [x]  Patient tolerated treatment well  []  Patient limited by fatigue  []  Patient limited by pain   []  Patient limited by other medical complications  []  Other:     Assessment: Patient progressing toward goals nicely. Tolerating increased strengthening this date without increase in pain. GOALS:  Patient Goal: Be able to do most everything with my left arm. Short Term Goals:  Time Frame: STG NOT FORMULATED DUE TO SHORT LOS - SEE LTG    Long Term Goals:  Time Frame: 4 weeks  1. Be able to use left hand to turn steering wheel without pain or difficulty.    2. Be able to use left hand to retrieve item from drive-thru or use SUKHI without difficulty. 3. Be able to complete hair care without pain in left UE. 4.  Be independent with HEP as instructed to increase her ability to complete normal tasks. 5.  Increase active left shoulder flexion to 160, abduction to 160, ER to 80, and get left thumb 5\" above waistband behind back to increase her ability to complete overhead kitchen task. Patient Education:   [x]  HEP/Education Completed: wall slide with removal of hand from wall; standing waist pull aparts with orange theraband  []  No new Education completed  []  Reviewed Prior HEP;   [x]  Patient verbalized and/or demonstrated understanding of education provided. []  Patient unable to verbalize and/or demonstrate understanding of education provided. Will continue education. [x]  Barriers to learning: none    PLAN:      []  Plan of care initiated. [x]  Continue with current plan of care  []  Modify plan of care as follows: 2 x week x 4 weeks from 3/18/21   []  Hold pending physician visit  []  Discharge    Time In 1000   Time Out 1030       Timed Code Minutes: Minutes Units   ADL (45759)     Aquatics (61810)     Manual Therapy (53989)     Neuro (64399)     Th. Activities (60221)     Th. Exercise (76566) 30 2   Wheelchair Mgmt (64640)     Cognitive Function (1st 15 min  36882)     Cognitive Function (per sub.  15 min  55119)     Ultrasound (82313)     Ionto (57801)     Manual E-Stim (08152)          TOTAL TREATMENT TIME: 30 minutes       Deuce Brooks, OTR/L #[id number]

## 2021-03-30 ENCOUNTER — HOSPITAL ENCOUNTER (OUTPATIENT)
Dept: OCCUPATIONAL THERAPY | Age: 71
Setting detail: THERAPIES SERIES
Discharge: HOME | End: 2021-03-30
Payer: MEDICARE

## 2021-03-30 PROCEDURE — 97110 THERAPEUTIC EXERCISES: CPT

## 2021-03-30 NOTE — PROGRESS NOTES
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]  Bicipital tendinitis, left shoulder [M75.22]    Treatment Diagnosis Left shoulder pain   Date of Evaluation 21      Functional Outcome Measure Used UEFS   Functional Outcome Score  (21)       Insurance: Primary: Payor: Iftikhar Valdez /  /  / ,   Secondary:    Authorization Information: Unlimited, no massage   Visit # 23; 3/0 for PN; PN completed on 3/18/21   Visits Allowed: unlimited   Recertification Date:    Physician Follow-Up: 21   Physician Orders: eval and treat per total shoulder protocol; script of 3/2 for strengthening, scapular stabilizers   Pertinent History: Patient reports that she has had problems with left shoulder for a couple of years. Had right shoulder replacement 10/2018. Had left TKA on 2020. Reports that she had left TSA by Dr. Ester Soria on 21. Patient returned earlier this week and received order for therapy at that time. Comorbidities include HTN, irregular heart beat, and arthritis that could influence rehab process. SUBJECTIVE: States that she has had some pain at the end of her scar.     TREATMENT   Precautions: Dr. Ester Soria TSA protocol   Pain:  1/10 pain  LOCATION: distal end of incisional scar on left shoulder    X in shaded column indicates Activity Completed Today   Modalities Parameters/  Location  Notes/Comments                     xManual Therapy Time/  Technique  Notes/Comments   STM to left upper trapezius

## 2021-04-01 ENCOUNTER — APPOINTMENT (OUTPATIENT)
Dept: OCCUPATIONAL THERAPY | Age: 71
End: 2021-04-01
Payer: MEDICARE

## 2021-04-02 ENCOUNTER — HOSPITAL ENCOUNTER (OUTPATIENT)
Dept: OCCUPATIONAL THERAPY | Age: 71
Setting detail: THERAPIES SERIES
Discharge: HOME | End: 2021-04-02
Payer: MEDICARE

## 2021-04-02 PROCEDURE — 97110 THERAPEUTIC EXERCISES: CPT

## 2021-04-02 NOTE — PROGRESS NOTES
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]  Bicipital tendinitis, left shoulder [M75.22]    Treatment Diagnosis Left shoulder pain   Date of Evaluation 21      Functional Outcome Measure Used UEFS   Functional Outcome Score  (21)       Insurance: Primary: Payor: Lieutenant Conroy /  /  / ,   Secondary:    Authorization Information: Unlimited, no massage   Visit # 24;4/10 for PN; PN completed on 3/18/21   Visits Allowed: unlimited   Recertification Date:    Physician Follow-Up: 21   Physician Orders: eval and treat per total shoulder protocol; script of 3/ for strengthening, scapular stabilizers   Pertinent History: Patient reports that she has had problems with left shoulder for a couple of years. Had right shoulder replacement 10/2018. Had left TKA on 2020. Reports that she had left TSA by Dr. Crista Louis on 21. Patient returned earlier this week and received order for therapy at that time. Comorbidities include HTN, irregular heart beat, and arthritis that could influence rehab process. SUBJECTIVE: States that her scar is feeling better since last therapy session.     TREATMENT   Precautions: Dr. Crista Louis TSA protocol   Pain:  No pain       X in shaded column indicates Activity Completed Today   Modalities Parameters/  Location  Notes/Comments                     xManual Therapy Time/  Technique  Notes/Comments   STM to left upper trapezius                  Exercises   Sets/  Sec Reps  Notes/Comments Supine PROM to left shoulder in all planes    x    Seated saeboglide for left shoulder flexion 1 15     Seated saeboglide for left shoulder scaption 1 15     Table slide for left shoulder abduction with thumb up and patient feeling minimal stretch 1 10     Supine PREs with 2# in left UE - shoulder flexion, ceiling punches, ceiling circles 1 10 each x    Supine orange theraband - chest pull aparts 1 10            Pulleys for shoulder flexion 1 15 x    Wall slide with left UE and removal of hand from wall 1 10 x 5 second hold   Supine active left shoulder horizontal abduction/adduction with 1# 1 10     Sidelying ER with elbow at side with 2# 1 10 x    biodex at 60 speed x 3 minutes forward and 3 minutes backward   x    Reaching activity of placing and removing clothespins from vertical post using left UE   x    Orange theraband - marcello with left UE 1 15 each direction     Orange theraband - waist pull aparts 1 15     Orange theraband - standing riivalid  1 10 in each direction     Submaximal isometrics with left UE - flexion, extension, IR, ER, abduction 1 10 each     Activities Time    Notes/Comments   Scar extractor to distal end of scar on left shoulder                      Specific Interventions Next Treatment: advance per Dr. Nasir Ingram protocol; strengthening, scapular stabilizers    Activity/Treatment Tolerance:  [x]  Patient tolerated treatment well  []  Patient limited by fatigue  []  Patient limited by pain   []  Patient limited by other medical complications  []  Other:     Assessment: Patient progressing toward goals nicely. Was able to tolerate slight increase in resistance today. GOALS:  Patient Goal: Be able to do most everything with my left arm. Short Term Goals:  Time Frame: STG NOT FORMULATED DUE TO SHORT LOS - SEE LTG    Long Term Goals:  Time Frame: 4 weeks  1. Be able to use left hand to turn steering wheel without pain or difficulty.    2. Be able to use left hand to retrieve item from drive-thru or use SUKHI without difficulty. 3. Be able to complete hair care without pain in left UE. 4.  Be independent with HEP as instructed to increase her ability to complete normal tasks. 5.  Increase active left shoulder flexion to 160, abduction to 160, ER to 80, and get left thumb 5\" above waistband behind back to increase her ability to complete overhead kitchen task. Patient Education:   [x]  HEP/Education Completed: wall slide with removal of hand from wall; standing waist pull aparts with orange theraband  []  No new Education completed  []  Reviewed Prior HEP;   [x]  Patient verbalized and/or demonstrated understanding of education provided. []  Patient unable to verbalize and/or demonstrate understanding of education provided. Will continue education. [x]  Barriers to learning: none    PLAN:      []  Plan of care initiated. [x]  Continue with current plan of care  []  Modify plan of care as follows: 2 x week x 4 weeks from 3/18/21   []  Hold pending physician visit  []  Discharge    Time In 1350   Time Out 1420       Timed Code Minutes: Minutes Units   ADL (53838)     Aquatics (14277)     Manual Therapy (18867)     Neuro (74154)     Th. Activities (99527)     Th. Exercise (66417) 30 2   Wheelchair Mgmt (19821)     Cognitive Function (1st 15 min - 04679)     Cognitive Function (per sub.  15 min - 56001)     Ultrasound (41802)     Ionto (56041)     Manual E-Stim (59753)          TOTAL TREATMENT TIME: 30 minutes       RAVI Iraheta/DELROY #[id number]

## 2021-04-06 ENCOUNTER — HOSPITAL ENCOUNTER (OUTPATIENT)
Dept: OCCUPATIONAL THERAPY | Age: 71
Setting detail: THERAPIES SERIES
Discharge: HOME | End: 2021-04-06
Payer: MEDICARE

## 2021-04-06 PROCEDURE — 97110 THERAPEUTIC EXERCISES: CPT

## 2021-04-06 NOTE — PROGRESS NOTES
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]  Bicipital tendinitis, left shoulder [M75.22]    Treatment Diagnosis Left shoulder pain   Date of Evaluation 21      Functional Outcome Measure Used UEFS   Functional Outcome Score  (21)       Insurance: Primary: Payor: Matthew Fernández /  /  / ,   Secondary:    Authorization Information: Unlimited, no massage   Visit # 25; /10 for PN; PN completed on 3/18/21   Visits Allowed: unlimited   Recertification Date:    Physician Follow-Up: 21   Physician Orders: eval and treat per total shoulder protocol; script of 3/ for strengthening, scapular stabilizers   Pertinent History: Patient reports that she has had problems with left shoulder for a couple of years. Had right shoulder replacement 10/2018. Had left TKA on 2020. Reports that she had left TSA by Dr. Danielle Angeles on 21. Patient returned earlier this week and received order for therapy at that time. Comorbidities include HTN, irregular heart beat, and arthritis that could influence rehab process. SUBJECTIVE: Patient reports she does not have pain with any motions. Has difficulty holding her arm over her head for any period of time.     TREATMENT   Precautions: Dr. Danielle Angeles TSA protocol   Pain:  No pain       X in shaded column indicates Activity Completed Today   Modalities Parameters/  Location  Notes/Comments                     xManual Therapy Time/  Technique  Notes/Comments   STM to left upper trapezius  x Exercises   Sets/  Sec Reps  Notes/Comments   Supine PROM to left shoulder in all planes    x All within functional limits - Improved flexion with STM to L upper trap. Seated saeboglide for left shoulder flexion 1 15     Seated saeboglide for left shoulder scaption 1 15     Supine 2# scapular punch 2 10 x    Supine 2# circles Cw and Ccw 1 10 each x    Supine orange theraband - chest pull aparts 1 10     Standing bilateral ITY  3 10 x 1# for flexion and scaption, no weight for abduction. Pulleys for shoulder flexion 1 15 x    Wall slide with left UE and removal of hand from wall 1 10 x 5 second hold   Supine active left shoulder horizontal abduction/adduction with 1# 1 10     Sidelying ER with elbow at side 2# 2 10 x    Biodex at 60 speed x 3 minutes forward and 3 minutes backward   x    Reaching activity of placing and removing clothespins from vertical post using left UE       [place]  4 15  x Orange band - Needs cues to avoid compensation. Orange theraband - waist pull aparts 1 15     Orange theraband - standing riivalid  1 10 in each direction     Submaximal isometrics with left UE - flexion, extension, IR, ER, abduction 1 10 each     Activities Time    Notes/Comments   Scar extractor to distal end of scar on left shoulder                      Specific Interventions Next Treatment: advance per Dr. Lynn Ng protocol; strengthening, scapular stabilizers    Activity/Treatment Tolerance:  [x]  Patient tolerated treatment well  []  Patient limited by fatigue  []  Patient limited by pain   []  Patient limited by other medical complications  []  Other:     Assessment: Patient progressing toward goals nicely. Strength and endurance continues to be limited. GOALS:  Patient Goal: Be able to do most everything with my left arm. Short Term Goals:  Time Frame: STG NOT FORMULATED DUE TO SHORT LOS - SEE LTG    Long Term Goals:  Time Frame: 4 weeks  1.  Be able to use left hand to turn steering wheel without pain or difficulty. 2. Be able to use left hand to retrieve item from drive-thru or use SUKHI without difficulty. 3. Be able to complete hair care without pain in left UE. 4.  Be independent with HEP as instructed to increase her ability to complete normal tasks. 5.  Increase active left shoulder flexion to 160, abduction to 160, ER to 80, and get left thumb 5\" above waistband behind back to increase her ability to complete overhead kitchen task. Patient Education:   [x]  HEP/Education Completed: wall slide with removal of hand from wall; standing waist pull aparts with orange theraband  []  No new Education completed  []  Reviewed Prior HEP;   [x]  Patient verbalized and/or demonstrated understanding of education provided. []  Patient unable to verbalize and/or demonstrate understanding of education provided. Will continue education. [x]  Barriers to learning: none    PLAN:      []  Plan of care initiated. [x]  Continue with current plan of care  []  Modify plan of care as follows: 2 x week x 4 weeks from 3/18/21   []  Hold pending physician visit  []  Discharge    Time In 0830   Time Out 0900       Timed Code Minutes: Minutes Units   ADL (76225)     Aquatics (07447)     Manual Therapy (08847)     Neuro (61897)     Th. Activities (14715)     Th. Exercise (82378) 30 2   Wheelchair Mgmt (19325)     Cognitive Function (1st 15 min - 90577)     Cognitive Function (per sub.  15 min - 20224)     Ultrasound (19791)     Ionto (20079)     Manual E-Stim (10504)          TOTAL TREATMENT TIME: 30 minutes       ISABELLE COON/DELROY #[id number]

## 2021-04-08 ENCOUNTER — HOSPITAL ENCOUNTER (OUTPATIENT)
Dept: OCCUPATIONAL THERAPY | Age: 71
Setting detail: THERAPIES SERIES
Discharge: HOME | End: 2021-04-08
Payer: MEDICARE

## 2021-04-08 PROCEDURE — 97110 THERAPEUTIC EXERCISES: CPT

## 2021-04-08 NOTE — PROGRESS NOTES
[address]  [] EVALUATION  [x] DAILY NOTE (LAND) [] DAILY NOTE (AQUATIC )   [] PROGRESS NOTE [] DISCHARGE NOTE    [] 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]  Bicipital tendinitis, left shoulder [M75.22]    Treatment Diagnosis Left shoulder pain   Date of Evaluation 21      Functional Outcome Measure Used UEFS   Functional Outcome Score  (21)       Insurance: Primary: Payor: Kiera Dial /  /  / ,   Secondary:    Authorization Information: Unlimited, no massage   Visit # 26; 6/10 for PN; PN completed on 3/18/21   Visits Allowed: unlimited   Recertification Date: 25   Physician Follow-Up: 21   Physician Orders: eval and treat per total shoulder protocol; script of 3/ for strengthening, scapular stabilizers   Pertinent History: Patient reports that she has had problems with left shoulder for a couple of years. Had right shoulder replacement 10/2018. Had left TKA on 2020. Reports that she had left TSA by Dr. Flo Arias on 21. Patient returned earlier this week and received order for therapy at that time. Comorbidities include HTN, irregular heart beat, and arthritis that could influence rehab process. SUBJECTIVE: States that she push mowed the back yard yesterday and did use left hand to help turn the mower, but had fatigue with activity.      TREATMENT   Precautions: Dr. Flo Arias TSA protocol   Pain:  No pain       X in shaded column indicates Activity Completed Today   Modalities Parameters/  Location  Notes/Comments                     xManual Therapy Time/  Technique  Notes/Comments   STM to left upper trapezius Exercises   Sets/  Sec Reps  Notes/Comments   Supine PROM to left shoulder in all planes     All within functional limits - Improved flexion with STM to L upper trap. Seated saeboglide for left shoulder flexion 1 15     Seated saeboglide for left shoulder scaption 1 15     Supine 2# scapular punch 1 15 x    Supine 2# circles Cw and Ccw 1 15 each x    Supine orange theraband - chest pull aparts 1 10     Standing bilateral ITY  2 10 x 1# for flexion and scaption, no weight for abduction. Pulleys for shoulder flexion 1 15 x    Wall slide with left UE and removal of hand from wall 1 10  5 second hold   Supine active left shoulder horizontal abduction/adduction with 1# 1 10     Sidelying ER with elbow at side 2# 1 15 x    Biodex at 60 speed x 3 minutes forward and 3 minutes backward   x    Reaching activity of placing and removing clothespins from vertical post using left UE       [place] with orange theraband in left UE 1 15 each x    Orange theraband - waist pull aparts 1 15 x    Orange theraband - standing riivalid  1 10 in each direction     Submaximal isometrics with left UE - flexion, extension, IR, ER, abduction 1 10 each     Activities Time    Notes/Comments   Scar extractor to distal end of scar on left shoulder                      Specific Interventions Next Treatment: advance per Dr. Nasir Ingram protocol; strengthening, scapular stabilizers    Activity/Treatment Tolerance:  [x]  Patient tolerated treatment well  []  Patient limited by fatigue  []  Patient limited by pain   []  Patient limited by other medical complications  []  Other:     Assessment: Patient progressing toward goals nicely. Strength continues to be limited, but improvements are being made. GOALS:  Patient Goal: Be able to do most everything with my left arm. Short Term Goals:  Time Frame: STG NOT FORMULATED DUE TO SHORT LOS - SEE LTG    Long Term Goals:  Time Frame: 4 weeks  1.  Be able to use left hand to turn steering wheel without pain or difficulty. 2. Be able to use left hand to retrieve item from drive-thru or use SUKHI without difficulty. 3. Be able to complete hair care without pain in left UE. 4.  Be independent with HEP as instructed to increase her ability to complete normal tasks. 5.  Increase active left shoulder flexion to 160, abduction to 160, ER to 80, and get left thumb 5\" above waistband behind back to increase her ability to complete overhead kitchen task. Patient Education:   [x]  HEP/Education Completed: bilateral shoulder flexion and scaption with 1#, bilateral shoulder abduction without weight  []  No new Education completed  []  Reviewed Prior HEP;   [x]  Patient verbalized and/or demonstrated understanding of education provided. []  Patient unable to verbalize and/or demonstrate understanding of education provided. Will continue education. [x]  Barriers to learning: none    PLAN:      []  Plan of care initiated. [x]  Continue with current plan of care  []  Modify plan of care as follows: 2 x week x 4 weeks from 3/18/21   []  Hold pending physician visit  []  Discharge    Time In 0845   Time Out 0915       Timed Code Minutes: Minutes Units   ADL (88 [id number])     Aquatics (24811)     Manual Therapy (23753)     Neuro (93022)     Th. Activities (09950)     Th. Exercise (25025) 30 2   Wheelchair Mgmt (08019)     Cognitive Function (1st 15 min - 37806)     Cognitive Function (per sub.  15 min - 72292)     Ultrasound (72327)     Ionto (33017)     Manual E-Stim (78013)          TOTAL TREATMENT TIME: 30 minutes       Hosea Brittle, OTR/DELROY #[id number]

## 2021-04-12 ENCOUNTER — HOSPITAL ENCOUNTER (OUTPATIENT)
Dept: OCCUPATIONAL THERAPY | Age: 71
Setting detail: THERAPIES SERIES
Discharge: HOME | End: 2021-04-12
Payer: MEDICARE

## 2021-04-12 PROCEDURE — 97110 THERAPEUTIC EXERCISES: CPT

## 2021-04-15 ENCOUNTER — HOSPITAL ENCOUNTER (OUTPATIENT)
Dept: OCCUPATIONAL THERAPY | Age: 71
Setting detail: THERAPIES SERIES
Discharge: HOME | End: 2021-04-15
Payer: MEDICARE

## 2021-04-15 NOTE — DISCHARGE SUMMARY
[address] NOTE  [address] [facility].        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 See last note for details related to results of therapy and goal achievement. Reason for discharge: Patient called clinic and stated that physician told her that she didn't need to have any  more therapy. Patient requested to cancel today's appointment and be discharged from therapy. OTR called patient and did FOM questionnaire over phone - score =75/80 on UEFS,.        Lia Naidu, OTR/L #[id number]

## 2021-04-20 ENCOUNTER — HOSPITAL ENCOUNTER (INPATIENT)
Age: 71
LOS: 3 days | Discharge: HOME | DRG: 482 | End: 2021-04-23
Attending: EMERGENCY MEDICINE | Admitting: ORTHOPAEDIC SURGERY
Payer: MEDICARE

## 2021-04-20 ENCOUNTER — APPOINTMENT (OUTPATIENT)
Dept: GENERAL RADIOLOGY | Age: 71
DRG: 482 | End: 2021-04-20
Payer: MEDICARE

## 2021-04-20 DIAGNOSIS — M79.604 RIGHT LEG PAIN: ICD-10-CM
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
## 2021-04-20 DIAGNOSIS — S72.331A CLOSED DISPLACED OBLIQUE FRACTURE OF SHAFT OF RIGHT FEMUR, INITIAL ENCOUNTER (HCC): ICD-10-CM
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
## 2021-04-20 DIAGNOSIS — D64.9 ACUTE ON CHRONIC ANEMIA: ICD-10-CM

## 2021-04-20 LAB
ANION GAP SERPL CALCULATED.3IONS-SCNC: 8 MEQ/L (ref 8–16)
BASOPHILS # BLD: 0.3 %
BASOPHILS ABSOLUTE: 0 THOU/MM3 (ref 0–0.1)
BUN BLDV-MCNC: 21 MG/DL (ref 7–22)
CALCIUM SERPL-MCNC: 10 MG/DL (ref 8.5–10.5)
CHLORIDE BLD-SCNC: 102 MEQ/L (ref 98–111)
CO2: 27 MEQ/L (ref 23–33)
CREAT SERPL-MCNC: 0.6 MG/DL (ref 0.4–1.2)
EKG ATRIAL RATE: 78 BPM
EKG P AXIS: 60 DEGREES
EKG P-R INTERVAL: 174 MS
EKG Q-T INTERVAL: 388 MS
EKG QRS DURATION: 76 MS
EKG QTC CALCULATION (BAZETT): 442 MS
EKG R AXIS: 27 DEGREES
EKG T AXIS: 37 DEGREES
EKG VENTRICULAR RATE: 78 BPM
EOSINOPHIL # BLD: 1.1 %
EOSINOPHILS ABSOLUTE: 0.1 THOU/MM3 (ref 0–0.4)
ERYTHROCYTE [DISTWIDTH] IN BLOOD BY AUTOMATED COUNT: 13.4 % (ref 11.5–14.5)
ERYTHROCYTE [DISTWIDTH] IN BLOOD BY AUTOMATED COUNT: 50.3 FL (ref 35–45)
GFR SERPL CREATININE-BSD FRML MDRD: > 90 ML/MIN/1.73M2
GLUCOSE BLD-MCNC: 125 MG/DL (ref 70–108)
HCT VFR BLD CALC: 42.4 % (ref 37–47)
HEMOGLOBIN: 13.6 GM/DL (ref 12–16)
IMMATURE GRANS (ABS): 0.03 THOU/MM3 (ref 0–0.07)
IMMATURE GRANULOCYTES: 0.4 %
LYMPHOCYTES # BLD: 13.1 %
LYMPHOCYTES ABSOLUTE: 0.9 THOU/MM3 (ref 1–4.8)
MCH RBC QN AUTO: 32.6 PG (ref 26–33)
MCHC RBC AUTO-ENTMCNC: 32.1 GM/DL (ref 32.2–35.5)
MCV RBC AUTO: 101.7 FL (ref 81–99)
MONOCYTES # BLD: 6.8 %
MONOCYTES ABSOLUTE: 0.5 THOU/MM3 (ref 0.4–1.3)
NUCLEATED RED BLOOD CELLS: 0 /100 WBC
OSMOLALITY CALCULATION: 278.3 MOSMOL/KG (ref 275–300)
PLATELET # BLD: 185 THOU/MM3 (ref 130–400)
PMV BLD AUTO: 11.9 FL (ref 9.4–12.4)
POTASSIUM SERPL-SCNC: 4.5 MEQ/L (ref 3.5–5.2)
RBC # BLD: 4.17 MILL/MM3 (ref 4.2–5.4)
SARS-COV-2, NAAT: NOT DETECTED
SEG NEUTROPHILS: 78.3 %
SEGMENTED NEUTROPHILS ABSOLUTE COUNT: 5.6 THOU/MM3 (ref 1.8–7.7)
SODIUM BLD-SCNC: 137 MEQ/L (ref 135–145)
WBC # BLD: 7.1 THOU/MM3 (ref 4.8–10.8)

## 2021-04-20 PROCEDURE — 93005 ELECTROCARDIOGRAM TRACING: CPT | Performed by: PHYSICIAN ASSISTANT

## 2021-04-20 PROCEDURE — 87635 SARS-COV-2 COVID-19 AMP PRB: CPT

## 2021-04-20 PROCEDURE — 1200000000 HC SEMI PRIVATE

## 2021-04-20 PROCEDURE — 96375 TX/PRO/DX INJ NEW DRUG ADDON: CPT

## 2021-04-20 PROCEDURE — 93010 ELECTROCARDIOGRAM REPORT: CPT | Performed by: INTERNAL MEDICINE

## 2021-04-20 PROCEDURE — 80048 BASIC METABOLIC PNL TOTAL CA: CPT

## 2021-04-20 PROCEDURE — 6360000002 HC RX W HCPCS: Performed by: STUDENT IN AN ORGANIZED HEALTH CARE EDUCATION/TRAINING PROGRAM

## 2021-04-20 PROCEDURE — 85025 COMPLETE CBC W/AUTO DIFF WBC: CPT

## 2021-04-20 PROCEDURE — 99283 EMERGENCY DEPT VISIT LOW MDM: CPT

## 2021-04-20 PROCEDURE — 6360000002 HC RX W HCPCS

## 2021-04-20 PROCEDURE — 73552 X-RAY EXAM OF FEMUR 2/>: CPT

## 2021-04-20 PROCEDURE — 2580000003 HC RX 258: Performed by: PHYSICIAN ASSISTANT

## 2021-04-20 PROCEDURE — 6360000002 HC RX W HCPCS: Performed by: PHYSICIAN ASSISTANT

## 2021-04-20 PROCEDURE — 36415 COLL VENOUS BLD VENIPUNCTURE: CPT

## 2021-04-20 PROCEDURE — 6820000001 HC L2 TRAUMA SURGERY EVALUATION: Performed by: ORTHOPAEDIC SURGERY

## 2021-04-20 PROCEDURE — 96374 THER/PROPH/DIAG INJ IV PUSH: CPT

## 2021-04-20 PROCEDURE — 1200000003 HC TELEMETRY R&B

## 2021-04-20 PROCEDURE — 6370000000 HC RX 637 (ALT 250 FOR IP): Performed by: PHYSICIAN ASSISTANT

## 2021-04-20 PROCEDURE — 71045 X-RAY EXAM CHEST 1 VIEW: CPT

## 2021-04-20 RX ORDER — MORPHINE SULFATE 4 MG/ML
4 INJECTION, SOLUTION INTRAMUSCULAR; INTRAVENOUS ONCE
Status: COMPLETED | OUTPATIENT
Start: 2021-04-20 | End: 2021-04-20

## 2021-04-20 RX ORDER — HYDROCODONE BITARTRATE AND ACETAMINOPHEN 5; 325 MG/1; MG/1
1 TABLET ORAL EVERY 4 HOURS PRN
Status: DISCONTINUED | OUTPATIENT
Start: 2021-04-20 | End: 2021-04-23 | Stop reason: HOSPADM

## 2021-04-20 RX ORDER — HYDROCODONE BITARTRATE AND ACETAMINOPHEN 5; 325 MG/1; MG/1
2 TABLET ORAL EVERY 4 HOURS PRN
Status: DISCONTINUED | OUTPATIENT
Start: 2021-04-20 | End: 2021-04-23 | Stop reason: HOSPADM

## 2021-04-20 RX ORDER — IBUPROFEN 200 MG
200 TABLET ORAL EVERY 6 HOURS PRN
COMMUNITY

## 2021-04-20 RX ORDER — CYCLOBENZAPRINE HCL 10 MG
10 TABLET ORAL 3 TIMES DAILY PRN
Status: DISCONTINUED | OUTPATIENT
Start: 2021-04-20 | End: 2021-04-23 | Stop reason: HOSPADM

## 2021-04-20 RX ORDER — MORPHINE SULFATE 2 MG/ML
2 INJECTION, SOLUTION INTRAMUSCULAR; INTRAVENOUS
Status: DISCONTINUED | OUTPATIENT
Start: 2021-04-20 | End: 2021-04-23 | Stop reason: HOSPADM

## 2021-04-20 RX ORDER — SODIUM CHLORIDE 9 MG/ML
INJECTION, SOLUTION INTRAVENOUS CONTINUOUS
Status: DISCONTINUED | OUTPATIENT
Start: 2021-04-20 | End: 2021-04-22

## 2021-04-20 RX ORDER — FENTANYL CITRATE 50 UG/ML
50 INJECTION, SOLUTION INTRAMUSCULAR; INTRAVENOUS ONCE
Status: COMPLETED | OUTPATIENT
Start: 2021-04-20 | End: 2021-04-20

## 2021-04-20 RX ORDER — PENICILLIN V POTASSIUM 500 MG/1
500 TABLET ORAL 4 TIMES DAILY
Status: ON HOLD | COMMUNITY
End: 2021-04-23 | Stop reason: HOSPADM

## 2021-04-20 RX ORDER — ONDANSETRON 2 MG/ML
4 INJECTION INTRAMUSCULAR; INTRAVENOUS ONCE
Status: COMPLETED | OUTPATIENT
Start: 2021-04-20 | End: 2021-04-20

## 2021-04-20 RX ORDER — MORPHINE SULFATE 2 MG/ML
INJECTION, SOLUTION INTRAMUSCULAR; INTRAVENOUS
Status: COMPLETED
Start: 2021-04-20 | End: 2021-04-20

## 2021-04-20 RX ADMIN — MORPHINE SULFATE 4 MG: 4 INJECTION, SOLUTION INTRAMUSCULAR; INTRAVENOUS at 16:25

## 2021-04-20 RX ADMIN — MORPHINE SULFATE 2 MG: 2 INJECTION, SOLUTION INTRAMUSCULAR; INTRAVENOUS at 20:29

## 2021-04-20 RX ADMIN — SODIUM CHLORIDE: 9 INJECTION, SOLUTION INTRAVENOUS at 22:28

## 2021-04-20 RX ADMIN — MORPHINE SULFATE 2 MG: 2 INJECTION, SOLUTION INTRAMUSCULAR; INTRAVENOUS at 23:58

## 2021-04-20 RX ADMIN — FENTANYL CITRATE 50 MCG: 50 INJECTION, SOLUTION INTRAMUSCULAR; INTRAVENOUS at 18:21

## 2021-04-20 RX ADMIN — ONDANSETRON 4 MG: 2 INJECTION INTRAMUSCULAR; INTRAVENOUS at 16:25

## 2021-04-20 RX ADMIN — HYDROCODONE BITARTRATE AND ACETAMINOPHEN 2 TABLET: 5; 325 TABLET ORAL at 22:29

## 2021-04-20 ASSESSMENT — ENCOUNTER SYMPTOMS
VOMITING: 0
COUGH: 0
CONSTIPATION: 0
SHORTNESS OF BREATH: 0
ABDOMINAL PAIN: 0
NAUSEA: 0
BACK PAIN: 0
DIARRHEA: 0

## 2021-04-20 ASSESSMENT — PAIN SCALES - GENERAL
PAINLEVEL_OUTOF10: 7
PAINLEVEL_OUTOF10: 4
PAINLEVEL_OUTOF10: 3
PAINLEVEL_OUTOF10: 7

## 2021-04-20 ASSESSMENT — PAIN DESCRIPTION - FREQUENCY
FREQUENCY: CONTINUOUS
FREQUENCY: CONTINUOUS

## 2021-04-20 ASSESSMENT — PAIN DESCRIPTION - PAIN TYPE
TYPE: ACUTE PAIN
TYPE: ACUTE PAIN

## 2021-04-20 ASSESSMENT — PAIN DESCRIPTION - PROGRESSION
CLINICAL_PROGRESSION: NOT CHANGED
CLINICAL_PROGRESSION: GRADUALLY WORSENING

## 2021-04-20 ASSESSMENT — PAIN DESCRIPTION - DESCRIPTORS: DESCRIPTORS: ACHING

## 2021-04-20 ASSESSMENT — PAIN DESCRIPTION - LOCATION
LOCATION: HIP;LEG
LOCATION: LEG

## 2021-04-20 ASSESSMENT — PAIN - FUNCTIONAL ASSESSMENT: PAIN_FUNCTIONAL_ASSESSMENT: PREVENTS OR INTERFERES SOME ACTIVE ACTIVITIES AND ADLS

## 2021-04-20 NOTE — ED NOTES
Patient states pain upon arrival was 3/10 in severity, patient states as long as she does not move her leg it is tolerable. Patient's pain increasing to 4/10 in severity and she is requesting pain medication. Discussed with provider.       Diana Campa RN  04/20/21 5270

## 2021-04-20 NOTE — ED NOTES
Patient to the ED via ems after a fall. Patient states that she was gardening today by the [facility] when she accidentally tripped and fell twisted her right lower extremity and falling to the ground. Since then she was unable to get up or move without intense pain. Patient states that she then had squad called. Patient arrives on backboard. She denies any other injuries. Patient is resting in bed with easy and unlabored respirations. Call light in reach. Side rails up x2. Patient denies further complaints or concerns. Will monitor.         Saeid Lewis RN  04/20/21 4923

## 2021-04-20 NOTE — ED PROVIDER NOTES
[facility]  EMERGENCY MEDICINE ATTENDING ATTESTATION      Evaluation of Linden Castillo. Case discussed and care plan developed with physician assistant. I agree with the physician assistant documentation and plan as documented by her, except if my documentation differs. Patient seen under indirect supervision. Please see the physician assistant completed note for final disposition except as documented on this attestation. I have reviewed and interpreted all available lab, radiology and ekg results available at the moment. Diagnosis, treatment and disposition plans were discussed and agreed upon by patient. This transcription was electronically signed. It was dictated by use of voice recognition software and electronically transcribed. The transcription may contain errors not detected in proofreading.      I performed direct supervision and was present for the critical portion following procedures: None  Critical care time on this case: None    Electronically signed by Alexx Carranza MD on 4/20/21 at 6:38 PM EDT       Alexx Carranza MD  04/20/21 0990

## 2021-04-20 NOTE — ED NOTES
Patient placed into hospital gown. Medicated per order. Patient given warm blanket. Denies other needs at this time.      Hari Knutson RN  04/20/21 3386

## 2021-04-20 NOTE — PROGRESS NOTES
Pharmacy Medication History Note      List of current medications patient is taking is complete. Source of information: Patient    Changes made to medication list:  Medications removed (include reason, ex. therapy complete or physician discontinued):  None    Medications added/doses adjusted: Added ibuprofen (Advil) 200 mg PO Q6H PRN  Added penicillin VK (Veetid) 500 mg PO four times daily recent root canal and possible tooth abscess    Other notes (ex. Recent course of antibiotics, Coumadin dosing):  Patient reports taking penicillin VK (Veetid) 500 mg PO four times daily for a recent failed root canal and possible tooth abscess. The patient started this medication on 04/07/21 for an original duration of 14 days. Per the patient, they were not able to schedule and appointment with a specialist until June and were given vague instructions to continue taking the penicillin. Denies use of other OTC or herbal medications.       Allergies reviewed      Electronically signed by Pily Shea on 4/20/2021 at 7:49 PM

## 2021-04-20 NOTE — ED PROVIDER NOTES
medical history of Hyperlipidemia, Hypertension, and Osteopenia. SURGICAL HISTORY      has a past surgical history that includes Inguinal hernia repair; Breast surgery; Colonoscopy; Tubal ligation; and Vein Surgery (Right, 2018). CURRENT MEDICATIONS       Previous Medications    ALENDRONATE (FOSAMAX) 70 MG TABLET    TAKE 1 TABLET EVERY 7 DAYS    ASPIRIN 81 MG EC TABLET    Take 81 mg by mouth daily    CALCIUM CARB-CHOLECALCIFEROL (CALTRATE 600+D3 SOFT PO)    Take by mouth    EZETIMIBE (ZETIA) 10 MG TABLET    TAKE 1 TABLET DAILY (NEED TO SCHEDULE APPOINTMENT)    LOSARTAN (COZAAR) 50 MG TABLET    TAKE 1 TABLET DAILY    METOPROLOL SUCCINATE (TOPROL XL) 25 MG EXTENDED RELEASE TABLET    Take 1 tablet by mouth daily    MULTIPLE VITAMINS-MINERALS (CENTRUM MULTI + OMEGA 3 PO)    Take by mouth    ROSUVASTATIN (CRESTOR) 20 MG TABLET    TAKE 1 TABLET DAILY       ALLERGIES     is allergic to demerol hcl [meperidine]. FAMILY HISTORY     She indicated that her mother is . She indicated that her father is . She indicated that the status of her sister is unknown. She indicated that her brother is . She indicated that the status of her paternal grandfather is unknown.   family history includes Colon Cancer in her mother; Heart Attack in her brother; Heart Disease in her sister; Heart Failure in her father; Stomach Cancer in her paternal grandfather. SOCIAL HISTORY      reports that she has never smoked. She has never used smokeless tobacco. She reports current alcohol use. She reports that she does not use drugs. PHYSICAL EXAM     INITIAL VITALS:  oral temperature is 98 °F (36.7 °C). Her blood pressure is 173/100 (abnormal) and her pulse is 79. Her respiration is 16 and oxygen saturation is 99%. Physical Exam  Vitals signs and nursing note reviewed. Constitutional:       General: She is not in acute distress. Appearance: Normal appearance. She is not diaphoretic.    HENT: Head: Normocephalic and atraumatic. Eyes:      Conjunctiva/sclera: Conjunctivae normal.   Neck:      Musculoskeletal: Normal range of motion and neck supple. Cardiovascular:      Rate and Rhythm: Normal rate and regular rhythm. Pulses: Normal pulses. Dorsalis pedis pulses are 2+ on the right side and 2+ on the left side. Heart sounds: Normal heart sounds. Pulmonary:      Effort: Pulmonary effort is normal.      Breath sounds: Normal breath sounds. Abdominal:      General: Abdomen is flat. Bowel sounds are normal.      Palpations: Abdomen is soft. Tenderness: There is no abdominal tenderness. Musculoskeletal:      Right hip: She exhibits decreased range of motion. She exhibits no tenderness and no bony tenderness. Left hip: Normal.      Right knee: Normal.      Right ankle: Normal. She exhibits normal range of motion. Legs:       Comments: Unable to move right leg because of extreme pain. Skin:     General: Skin is warm and dry. Neurological:      General: No focal deficit present. Mental Status: She is alert and oriented to person, place, and time. GCS: GCS eye subscore is 4. GCS verbal subscore is 5. GCS motor subscore is 6. Sensory: Sensation is intact.    Psychiatric:         Mood and Affect: Mood normal.         Behavior: Behavior normal.         DIFFERENTIAL DIAGNOSIS:   Differential diagnoses are discussed  Femoral fracture, muscle strain, hip fracture, osteoarthritis, osteoporosis    DIAGNOSTIC RESULTS     EKG: All EKG's are interpreted by the Emergency Department Physician who either signs or Co-signsthis chart in the absence of a cardiologist.        RADIOLOGY: non-plain film images(s) such as CT, Ultrasound and MRI are read by the radiologist.    XR FEMUR RIGHT (MIN 2 VIEWS)    (Results Pending)   XR CHEST 1 VIEW    (Results Pending)       LABS:      Labs Reviewed   [facility]

## 2021-04-20 NOTE — ED PROVIDER NOTES
voice recognition software. It may contain minor errors which are inherent in voice recognition technology. **      Final report electronically signed by Dr. Reny Rascon MD on 4/20/2021 5:31 PM           Further MDM and disposition:   Assessment:   1. Well-appearing 66-year-old female. Traumatic fall. No concerns of syncope. Right hip fracture. Patient neurovascularly intact. Plan:    Consult orthopedics for admission. Admit order placed 7K, Dr. Jani Eubanks. Pain control as needed, 50 mcg fentanyl. Final diagnoses:   Fall in home, initial encounter   Right leg pain   Closed displaced oblique fracture of shaft of right femur, initial encounter ([facility] [address]     New Prescriptions    No medications on file         Condition: condition: stable  Dispo: Admit to telemetry    This transcription was electronically signed. It was dictated by use of voice recognition software and electronically transcribed. The transcription may contain errors not detected in proofreading.       Kirk Lennox, DO  Resident  04/20/21 4660

## 2021-04-20 NOTE — ED NOTES
ED nurse-to-nurse bedside report    Chief Complaint   Patient presents with    Fall    Leg Pain      LOC: alert and orientated to name, place, date  Vital signs   Vitals:    04/20/21 1501   BP: (!) 173/100   Pulse: 79   Resp: 16   Temp: 98 °F (36.7 °C)   TempSrc: Oral   SpO2: 99%      Pain:    Pain Interventions: see MAR  Pain Goal: 2/10  Oxygen: NA    Current needs required room air   Telemetry: Yes  LDAs:   Peripheral IV 04/20/21 Right Antecubital (Active)   Site Assessment Clean;Dry; Intact 04/20/21 1624   Line Status Blood return noted; Flushed 04/20/21 1624   Dressing Status Clean;Dry; Intact 04/20/21 1624   Dressing Intervention New 04/20/21 1624     Continuous Infusions:   Mobility: Fully dependent  Horton Fall Risk Score: Fall Risk 9/22/2020 9/17/2019 3/1/2018   2 or more falls in past year? no no no   Fall with injury in past year? no no no     Fall Interventions: Side rails up x2, call light in reach  Report given to:  Andrew Meier RN  04/20/21 6670

## 2021-04-21 ENCOUNTER — ANESTHESIA EVENT (OUTPATIENT)
Dept: OPERATING ROOM | Age: 71
DRG: 482 | End: 2021-04-21
Payer: MEDICARE

## 2021-04-21 ENCOUNTER — APPOINTMENT (OUTPATIENT)
Dept: GENERAL RADIOLOGY | Age: 71
DRG: 482 | End: 2021-04-21
Payer: MEDICARE

## 2021-04-21 ENCOUNTER — ANESTHESIA (OUTPATIENT)
Dept: OPERATING ROOM | Age: 71
DRG: 482 | End: 2021-04-21
Payer: MEDICARE

## 2021-04-21 VITALS
RESPIRATION RATE: 11 BRPM | OXYGEN SATURATION: 100 % | TEMPERATURE: 96.6 F | DIASTOLIC BLOOD PRESSURE: 83 MMHG | SYSTOLIC BLOOD PRESSURE: 145 MMHG

## 2021-04-21 LAB — INR BLD: 1.02 (ref 0.85–1.13)

## 2021-04-21 PROCEDURE — 2580000003 HC RX 258: Performed by: PHYSICIAN ASSISTANT

## 2021-04-21 PROCEDURE — C1713 ANCHOR/SCREW BN/BN,TIS/BN: HCPCS | Performed by: ORTHOPAEDIC SURGERY

## 2021-04-21 PROCEDURE — 3700000000 HC ANESTHESIA ATTENDED CARE: Performed by: ORTHOPAEDIC SURGERY

## 2021-04-21 PROCEDURE — 73552 X-RAY EXAM OF FEMUR 2/>: CPT

## 2021-04-21 PROCEDURE — 3700000001 HC ADD 15 MINUTES (ANESTHESIA): Performed by: ORTHOPAEDIC SURGERY

## 2021-04-21 PROCEDURE — 7100000000 HC PACU RECOVERY - FIRST 15 MIN: Performed by: ORTHOPAEDIC SURGERY

## 2021-04-21 PROCEDURE — 2580000003 HC RX 258: Performed by: NURSE ANESTHETIST, CERTIFIED REGISTERED

## 2021-04-21 PROCEDURE — 6370000000 HC RX 637 (ALT 250 FOR IP): Performed by: NURSE PRACTITIONER

## 2021-04-21 PROCEDURE — 1200000003 HC TELEMETRY R&B

## 2021-04-21 PROCEDURE — 2500000003 HC RX 250 WO HCPCS: Performed by: ORTHOPAEDIC SURGERY

## 2021-04-21 PROCEDURE — 2709999900 HC NON-CHARGEABLE SUPPLY: Performed by: ORTHOPAEDIC SURGERY

## 2021-04-21 PROCEDURE — 2580000003 HC RX 258: Performed by: ORTHOPAEDIC SURGERY

## 2021-04-21 PROCEDURE — 36415 COLL VENOUS BLD VENIPUNCTURE: CPT

## 2021-04-21 PROCEDURE — 3600000004 HC SURGERY LEVEL 4 BASE: Performed by: ORTHOPAEDIC SURGERY

## 2021-04-21 PROCEDURE — 6360000002 HC RX W HCPCS: Performed by: NURSE ANESTHETIST, CERTIFIED REGISTERED

## 2021-04-21 PROCEDURE — 7100000001 HC PACU RECOVERY - ADDTL 15 MIN: Performed by: ORTHOPAEDIC SURGERY

## 2021-04-21 PROCEDURE — 0QS606Z REPOSITION RIGHT UPPER FEMUR WITH INTRAMEDULLARY INTERNAL FIXATION DEVICE, OPEN APPROACH: ICD-10-PCS | Performed by: ORTHOPAEDIC SURGERY

## 2021-04-21 PROCEDURE — 6360000002 HC RX W HCPCS: Performed by: NURSE PRACTITIONER

## 2021-04-21 PROCEDURE — 6370000000 HC RX 637 (ALT 250 FOR IP): Performed by: PHYSICIAN ASSISTANT

## 2021-04-21 PROCEDURE — 6360000002 HC RX W HCPCS: Performed by: ORTHOPAEDIC SURGERY

## 2021-04-21 PROCEDURE — 2580000003 HC RX 258: Performed by: NURSE PRACTITIONER

## 2021-04-21 PROCEDURE — 6360000002 HC RX W HCPCS: Performed by: PHYSICIAN ASSISTANT

## 2021-04-21 PROCEDURE — 3209999900 FLUORO FOR SURGICAL PROCEDURES

## 2021-04-21 PROCEDURE — 2500000003 HC RX 250 WO HCPCS: Performed by: NURSE ANESTHETIST, CERTIFIED REGISTERED

## 2021-04-21 PROCEDURE — 99221 1ST HOSP IP/OBS SF/LOW 40: CPT | Performed by: FAMILY MEDICINE

## 2021-04-21 PROCEDURE — 2720000010 HC SURG SUPPLY STERILE: Performed by: ORTHOPAEDIC SURGERY

## 2021-04-21 PROCEDURE — 3600000014 HC SURGERY LEVEL 4 ADDTL 15MIN: Performed by: ORTHOPAEDIC SURGERY

## 2021-04-21 PROCEDURE — 1200000000 HC SEMI PRIVATE

## 2021-04-21 PROCEDURE — 85610 PROTHROMBIN TIME: CPT

## 2021-04-21 PROCEDURE — 73502 X-RAY EXAM HIP UNI 2-3 VIEWS: CPT

## 2021-04-21 DEVICE — META-TAN LAG/COMPRESSION SCREW KIT 80MM/75MM
Type: IMPLANTABLE DEVICE | Status: FUNCTIONAL
Brand: TRIGEN

## 2021-04-21 DEVICE — META-TAN NAIL 10MM X 38CM RIGHT
Type: IMPLANTABLE DEVICE | Status: FUNCTIONAL
Brand: TRIGEN

## 2021-04-21 DEVICE — TRIGEN LOW PROFILE SCREW 5.0MM X 40MM
Type: IMPLANTABLE DEVICE | Status: FUNCTIONAL
Brand: TRIGEN

## 2021-04-21 RX ORDER — PHENYLEPHRINE HYDROCHLORIDE 10 MG/ML
INJECTION INTRAVENOUS PRN
Status: DISCONTINUED | OUTPATIENT
Start: 2021-04-21 | End: 2021-04-21 | Stop reason: SDUPTHER

## 2021-04-21 RX ORDER — LABETALOL 20 MG/4 ML (5 MG/ML) INTRAVENOUS SYRINGE
5 EVERY 4 HOURS PRN
Status: DISCONTINUED | OUTPATIENT
Start: 2021-04-21 | End: 2021-04-23 | Stop reason: HOSPADM

## 2021-04-21 RX ORDER — ROSUVASTATIN CALCIUM 20 MG/1
20 TABLET, COATED ORAL DAILY
Status: DISCONTINUED | OUTPATIENT
Start: 2021-04-21 | End: 2021-04-23 | Stop reason: HOSPADM

## 2021-04-21 RX ORDER — LIDOCAINE HCL/PF 100 MG/5ML
SYRINGE (ML) INJECTION PRN
Status: DISCONTINUED | OUTPATIENT
Start: 2021-04-21 | End: 2021-04-21 | Stop reason: SDUPTHER

## 2021-04-21 RX ORDER — PROPOFOL 10 MG/ML
INJECTION, EMULSION INTRAVENOUS PRN
Status: DISCONTINUED | OUTPATIENT
Start: 2021-04-21 | End: 2021-04-21 | Stop reason: SDUPTHER

## 2021-04-21 RX ORDER — METOPROLOL SUCCINATE 25 MG/1
25 TABLET, EXTENDED RELEASE ORAL DAILY
Status: DISCONTINUED | OUTPATIENT
Start: 2021-04-21 | End: 2021-04-23 | Stop reason: HOSPADM

## 2021-04-21 RX ORDER — LABETALOL 20 MG/4 ML (5 MG/ML) INTRAVENOUS SYRINGE
5 EVERY 10 MIN PRN
Status: DISCONTINUED | OUTPATIENT
Start: 2021-04-21 | End: 2021-04-21 | Stop reason: HOSPADM

## 2021-04-21 RX ORDER — EZETIMIBE 10 MG/1
10 TABLET ORAL NIGHTLY
Status: DISCONTINUED | OUTPATIENT
Start: 2021-04-21 | End: 2021-04-23 | Stop reason: HOSPADM

## 2021-04-21 RX ORDER — ROCURONIUM BROMIDE 10 MG/ML
INJECTION, SOLUTION INTRAVENOUS PRN
Status: DISCONTINUED | OUTPATIENT
Start: 2021-04-21 | End: 2021-04-21 | Stop reason: SDUPTHER

## 2021-04-21 RX ORDER — MEPERIDINE HYDROCHLORIDE 25 MG/ML
12.5 INJECTION INTRAMUSCULAR; INTRAVENOUS; SUBCUTANEOUS EVERY 5 MIN PRN
Status: DISCONTINUED | OUTPATIENT
Start: 2021-04-21 | End: 2021-04-21 | Stop reason: HOSPADM

## 2021-04-21 RX ORDER — NEOSTIGMINE METHYLSULFATE 5 MG/5 ML
SYRINGE (ML) INTRAVENOUS PRN
Status: DISCONTINUED | OUTPATIENT
Start: 2021-04-21 | End: 2021-04-21 | Stop reason: SDUPTHER

## 2021-04-21 RX ORDER — SODIUM CHLORIDE 9 MG/ML
INJECTION, SOLUTION INTRAVENOUS CONTINUOUS PRN
Status: DISCONTINUED | OUTPATIENT
Start: 2021-04-21 | End: 2021-04-21 | Stop reason: SDUPTHER

## 2021-04-21 RX ORDER — ALENDRONATE SODIUM 70 MG/1
70 TABLET ORAL
Status: DISCONTINUED | OUTPATIENT
Start: 2021-04-21 | End: 2021-04-21

## 2021-04-21 RX ORDER — DOCUSATE SODIUM 100 MG/1
100 CAPSULE, LIQUID FILLED ORAL DAILY
Status: DISCONTINUED | OUTPATIENT
Start: 2021-04-21 | End: 2021-04-23 | Stop reason: HOSPADM

## 2021-04-21 RX ORDER — GLYCOPYRROLATE 1 MG/5 ML
SYRINGE (ML) INTRAVENOUS PRN
Status: DISCONTINUED | OUTPATIENT
Start: 2021-04-21 | End: 2021-04-21 | Stop reason: SDUPTHER

## 2021-04-21 RX ORDER — LOSARTAN POTASSIUM 50 MG/1
50 TABLET ORAL DAILY
Status: DISCONTINUED | OUTPATIENT
Start: 2021-04-21 | End: 2021-04-23 | Stop reason: HOSPADM

## 2021-04-21 RX ORDER — FENTANYL CITRATE 50 UG/ML
25 INJECTION, SOLUTION INTRAMUSCULAR; INTRAVENOUS EVERY 5 MIN PRN
Status: DISCONTINUED | OUTPATIENT
Start: 2021-04-21 | End: 2021-04-21 | Stop reason: HOSPADM

## 2021-04-21 RX ORDER — DEXAMETHASONE SODIUM PHOSPHATE 10 MG/ML
INJECTION, EMULSION INTRAMUSCULAR; INTRAVENOUS PRN
Status: DISCONTINUED | OUTPATIENT
Start: 2021-04-21 | End: 2021-04-21 | Stop reason: SDUPTHER

## 2021-04-21 RX ORDER — PROMETHAZINE HYDROCHLORIDE 25 MG/ML
12.5 INJECTION, SOLUTION INTRAMUSCULAR; INTRAVENOUS
Status: DISCONTINUED | OUTPATIENT
Start: 2021-04-21 | End: 2021-04-21 | Stop reason: HOSPADM

## 2021-04-21 RX ORDER — FENTANYL CITRATE 50 UG/ML
50 INJECTION, SOLUTION INTRAMUSCULAR; INTRAVENOUS EVERY 5 MIN PRN
Status: DISCONTINUED | OUTPATIENT
Start: 2021-04-21 | End: 2021-04-21 | Stop reason: HOSPADM

## 2021-04-21 RX ORDER — FENTANYL CITRATE 50 UG/ML
INJECTION, SOLUTION INTRAMUSCULAR; INTRAVENOUS PRN
Status: DISCONTINUED | OUTPATIENT
Start: 2021-04-21 | End: 2021-04-21 | Stop reason: SDUPTHER

## 2021-04-21 RX ORDER — ONDANSETRON 2 MG/ML
INJECTION INTRAMUSCULAR; INTRAVENOUS PRN
Status: DISCONTINUED | OUTPATIENT
Start: 2021-04-21 | End: 2021-04-21 | Stop reason: SDUPTHER

## 2021-04-21 RX ADMIN — Medication 3 ML: at 10:56

## 2021-04-21 RX ADMIN — Medication 0.8 MG: at 11:44

## 2021-04-21 RX ADMIN — CYCLOBENZAPRINE HYDROCHLORIDE 10 MG: 10 TABLET, FILM COATED ORAL at 04:54

## 2021-04-21 RX ADMIN — DEXAMETHASONE SODIUM PHOSPHATE 10 MG: 10 INJECTION, EMULSION INTRAMUSCULAR; INTRAVENOUS at 11:02

## 2021-04-21 RX ADMIN — FENTANYL CITRATE 50 MCG: 50 INJECTION, SOLUTION INTRAMUSCULAR; INTRAVENOUS at 11:11

## 2021-04-21 RX ADMIN — SODIUM CHLORIDE: 9 INJECTION, SOLUTION INTRAVENOUS at 21:06

## 2021-04-21 RX ADMIN — HYDROCODONE BITARTRATE AND ACETAMINOPHEN 2 TABLET: 5; 325 TABLET ORAL at 23:53

## 2021-04-21 RX ADMIN — PHENYLEPHRINE HYDROCHLORIDE 100 MCG: 10 INJECTION INTRAVENOUS at 11:33

## 2021-04-21 RX ADMIN — FENTANYL CITRATE 100 MCG: 50 INJECTION, SOLUTION INTRAMUSCULAR; INTRAVENOUS at 10:56

## 2021-04-21 RX ADMIN — ONDANSETRON HYDROCHLORIDE 4 MG: 4 INJECTION, SOLUTION INTRAMUSCULAR; INTRAVENOUS at 11:15

## 2021-04-21 RX ADMIN — DOCUSATE SODIUM 100 MG: 100 CAPSULE, LIQUID FILLED ORAL at 21:06

## 2021-04-21 RX ADMIN — PROPOFOL 140 MG: 10 INJECTION, EMULSION INTRAVENOUS at 10:56

## 2021-04-21 RX ADMIN — RIVAROXABAN 10 MG: 10 TABLET, FILM COATED ORAL at 21:06

## 2021-04-21 RX ADMIN — PHENYLEPHRINE HYDROCHLORIDE 100 MCG: 10 INJECTION INTRAVENOUS at 11:36

## 2021-04-21 RX ADMIN — SODIUM CHLORIDE: 9 INJECTION, SOLUTION INTRAVENOUS at 10:07

## 2021-04-21 RX ADMIN — MORPHINE SULFATE 2 MG: 2 INJECTION, SOLUTION INTRAMUSCULAR; INTRAVENOUS at 04:54

## 2021-04-21 RX ADMIN — Medication 4 MG: at 11:44

## 2021-04-21 RX ADMIN — SODIUM CHLORIDE: 9 INJECTION, SOLUTION INTRAVENOUS at 10:53

## 2021-04-21 RX ADMIN — MORPHINE SULFATE 2 MG: 2 INJECTION, SOLUTION INTRAMUSCULAR; INTRAVENOUS at 01:57

## 2021-04-21 RX ADMIN — ROCURONIUM BROMIDE 40 MG: 10 INJECTION INTRAVENOUS at 10:56

## 2021-04-21 RX ADMIN — HYDROCODONE BITARTRATE AND ACETAMINOPHEN 2 TABLET: 5; 325 TABLET ORAL at 03:42

## 2021-04-21 RX ADMIN — PHENYLEPHRINE HYDROCHLORIDE 100 MCG: 10 INJECTION INTRAVENOUS at 11:37

## 2021-04-21 RX ADMIN — MORPHINE SULFATE 2 MG: 2 INJECTION, SOLUTION INTRAMUSCULAR; INTRAVENOUS at 07:40

## 2021-04-21 RX ADMIN — PHENYLEPHRINE HYDROCHLORIDE 100 MCG: 10 INJECTION INTRAVENOUS at 11:31

## 2021-04-21 RX ADMIN — CEFAZOLIN 2000 MG: 10 INJECTION, POWDER, FOR SOLUTION INTRAVENOUS at 11:00

## 2021-04-21 RX ADMIN — CEFAZOLIN 2000 MG: 10 INJECTION, POWDER, FOR SOLUTION INTRAVENOUS at 18:23

## 2021-04-21 RX ADMIN — FENTANYL CITRATE 50 MCG: 50 INJECTION, SOLUTION INTRAMUSCULAR; INTRAVENOUS at 11:20

## 2021-04-21 RX ADMIN — HYDROCODONE BITARTRATE AND ACETAMINOPHEN 2 TABLET: 5; 325 TABLET ORAL at 19:19

## 2021-04-21 RX ADMIN — METOPROLOL SUCCINATE 25 MG: 25 TABLET, FILM COATED, EXTENDED RELEASE ORAL at 10:47

## 2021-04-21 RX ADMIN — HYDROCODONE BITARTRATE AND ACETAMINOPHEN 2 TABLET: 5; 325 TABLET ORAL at 15:09

## 2021-04-21 ASSESSMENT — PULMONARY FUNCTION TESTS
PIF_VALUE: 0
PIF_VALUE: 14
PIF_VALUE: 17
PIF_VALUE: 0
PIF_VALUE: 9
PIF_VALUE: 17
PIF_VALUE: 1
PIF_VALUE: 16
PIF_VALUE: 17
PIF_VALUE: 16
PIF_VALUE: 17
PIF_VALUE: 16
PIF_VALUE: 17
PIF_VALUE: 17
PIF_VALUE: 16
PIF_VALUE: 17
PIF_VALUE: 25
PIF_VALUE: 17
PIF_VALUE: 18
PIF_VALUE: 16
PIF_VALUE: 15
PIF_VALUE: 16
PIF_VALUE: 16
PIF_VALUE: 2
PIF_VALUE: 11
PIF_VALUE: 2

## 2021-04-21 ASSESSMENT — PAIN DESCRIPTION - FREQUENCY: FREQUENCY: CONTINUOUS

## 2021-04-21 ASSESSMENT — PAIN SCALES - GENERAL
PAINLEVEL_OUTOF10: 8
PAINLEVEL_OUTOF10: 7
PAINLEVEL_OUTOF10: 7
PAINLEVEL_OUTOF10: 1
PAINLEVEL_OUTOF10: 7

## 2021-04-21 ASSESSMENT — PAIN DESCRIPTION - DESCRIPTORS: DESCRIPTORS: ACHING;SORE

## 2021-04-21 ASSESSMENT — PAIN DESCRIPTION - ONSET: ONSET: ON-GOING

## 2021-04-21 ASSESSMENT — PAIN DESCRIPTION - PROGRESSION
CLINICAL_PROGRESSION: GRADUALLY IMPROVING
CLINICAL_PROGRESSION: GRADUALLY WORSENING

## 2021-04-21 ASSESSMENT — PAIN DESCRIPTION - ORIENTATION
ORIENTATION: RIGHT

## 2021-04-21 ASSESSMENT — PAIN DESCRIPTION - LOCATION
LOCATION: HIP
LOCATION: HIP;LEG

## 2021-04-21 ASSESSMENT — PAIN - FUNCTIONAL ASSESSMENT: PAIN_FUNCTIONAL_ASSESSMENT: PREVENTS OR INTERFERES SOME ACTIVE ACTIVITIES AND ADLS

## 2021-04-21 ASSESSMENT — PAIN DESCRIPTION - PAIN TYPE
TYPE: ACUTE PAIN;SURGICAL PAIN
TYPE: ACUTE PAIN;SURGICAL PAIN

## 2021-04-21 NOTE — PLAN OF CARE
Hospitalist progress note    S: Patient was seen past midnight by the resident Dr. Jorge Ott and nocturnist Dr. Mahnaz Puri for preop clearance. Please see initial hospitalist consult note for details. Today, patient reports constant right thigh pain, 4 out of 10 when she is not moving, 10 out of 10 on movement. She denies chest pain, shortness of breath, dizziness, palpitations, headache, nausea, vomiting, focal weakness numbness, abdominal pain, diarrhea, constipation or UTI symptoms, fever, chills. O: BP (!) 145/70   Pulse 97   Temp 97.5 °F (36.4 °C) (Oral)   Resp 14   Ht 5' 3\" (1.6 m)   Wt 152 lb (68.9 kg)   LMP  (LMP Unknown) Comment: menopausal   SpO2 97%   BMI 26.93 kg/m²    General appearance: alert, not in acute distress  HEENT: PERRLA, clear oral mucosa  Chest: on room air, clear to auscultation on anterior lung fields. , no wheezes, rales or rhonchi, symmetric air entry. Hard to assess posterior lung fields, patient cannot move due to right femur fracture. CVS exam: normal rate, regular rhythm, normal S1, S2, no murmurs, rubs, clicks or gallops. Abdominal exam: non-distended, NABS, soft, non-tender, no guarding, no masses or organomegaly. Musculoskeletal: Positive right femur fracture (did not examine),leg boot and traction in placed on RLE  Neurological exam reveals alert, oriented, normal speech, no focal findings or movement disorder noted, motor and sensory grossly normal bilaterally. Exam of extremities: peripheral pulses normal, no pedal or leg edema, no clubbing or cyanosis    A/P: 33-year-old female admitted under orthopedic service for right femur fracture. Hospital medicine service was consulted for preop clearance. Please see initial hospital medicine service consult note for details.   Discussed again to patient per [facility]CRI, has 3.9% 30-day risk of death, MI and cardiac arrest.  Patient is accepting the risks and would like to proceed with the procedure. Patient has uncontrolled hypertension, BP improving, continue metoprolol as ordered, resume losartan after procedure. Add labetalol IV as needed as ordered. We will continue to follow. Thank you for the consult.     Electronically signed by Aileen Cai MD on 4/21/2021 at 9:38 AM

## 2021-04-21 NOTE — PROGRESS NOTES
Admitted to PACU from OR. Report received from anesthesia. Lethargic but arouses easily. Denies c/o pain. +2 right pedal pulse. + sensation right foot/leg. Able to wiggle toes. Right thigh edematous-no signs of bleeding. Some areas tight, others not. [place], [place] notified-no orders noted. Will continue to monitor. Ice bag applied to site.

## 2021-04-21 NOTE — PLAN OF CARE
Problem: Pain:  Goal: Pain level will decrease  Description: Pain level will decrease  Outcome: Ongoing  Note: Patient reports pain at a 7-8 on scale. Patient states oral and IV medication helping to achieve pain goal of a 3 on scale. Patient able to tolerate ice to right hip and traction at 5lbs. Goal: Control of acute pain  Description: Control of acute pain  Outcome: Ongoing  Goal: Control of chronic pain  Description: Control of chronic pain  Outcome: Ongoing     Problem: Falls - Risk of:  Goal: Will remain free from falls  Description: Will remain free from falls  Outcome: Ongoing  Note: Patient using call light appropriately to call for assistance. Patient is currently on bedrest awaiting surgery. Patient reports understanding of fall prevention when discussed. Bed alarm remains in place. Goal: Absence of physical injury  Description: Absence of physical injury  Outcome: Ongoing     Problem: Discharge Planning:  Goal: Discharged to appropriate level of care  Description: Discharged to appropriate level of care  Outcome: Ongoing  Note: Patient plans home vs ECF at discharge. Case management assisting with discharge planning. Care plan reviewed with patient and . Patient and  verbalize understanding of the plan of care and contribute to goal setting.

## 2021-04-21 NOTE — OP NOTE
Operative Note      Patient: Foster Thomas  YOB: 1950  MRN: [id number]    Date of Procedure: 4/21/2021    Pre-Op Diagnosis: Right closed displaced subtrochanteric femur fracture pathologic in nature secondary to bisphosphonate therapy    Post-Op Diagnosis: Same       Procedure:  Open treatment right subtrochanteric femur fracture with a cephalomedullary nail 82782    Surgeon(s):  Mariya Trotter MD    Assistant:   Physician Assistant: Oleg La PA-C    Anesthesia: General    Estimated Blood Loss (mL): 342     Complications: None    Specimens:   * No specimens in log *    Implants:  Implant Name Type Inv. Item Serial No.  Lot No. LRB No. Used Action   NAIL IM L38CM [id number] RT HENRY META-TAN  NAIL IM L38CM [id number] RT HENRY META-TAN  [employer]- [id number] Right 1 Implanted   KIT SCR [id number] LAG COMPR INTEGR META-TAN  KIT SCR [id number] LAG COMPR INTEGR META-BENSON  [employer]- [id number] Right 1 Implanted   SCREW BNE L40MM DIA5MM DEX TIB TI INT HEX LO PROF FOR IM  SCREW BNE L40MM DIA5MM DEX TIB TI INT HEX LO PROF FOR IM  [employer] Edgar Fransisco [id number] Right 1 Implanted         Drains:   Urethral Catheter Straight-tip 16 fr (Active)       Findings: This appears to be a pathologic fracture secondary to bisphosphonate therapy. There is some beaking at that fracture site. This be consistent with a bisphosphonate type fracture. Detailed Description of Procedure: Indications  This is a 75-year-old female history of a fall. Pain in the right hip area. Was seen in the emergency department diagnosed with a subtrochanteric femur fracture. She was admitted internal medicine evaluated her and felt her to be of acceptable risk for surgical intervention. Felt she would benefit from surgical intervention stabilization of the fracture which would allow weightbearing and fracture reduction maintenance. Patient agreed.     Narrative  Patient taken the operating underwent general anesthetic. Placed in the fracture table with longitudinal traction. Left leg was draped out the field. Timeout was taken consent was confirmed. He received 2 g of Ancef preoperatively. Started with a 3.2 mm guidepin on the medial face the greater trochanter. Was advanced under fluoroscopic guidance. A 14 mm channel reamers and passed down the level lesser trochanter. Made a small stab wound and a pointed reduction device was utilized to reduce the deflection of that fragment. Used a finger reduction tool and reduce the fracture. Ball-tipped was placed. Reamed up sequentially to size 12. Measured the length to be 38 cm. Placed a 10 x 380 mm [employer] tan nail on appropriate depth. A 3.2 Harvinder [place] guidepin was placed in the near center of the femoral head. Use the template followed by the cannulated reamer. Placed an 80 mm lag screw and a 75 mm compression screw. This maintained the reduction of the proximal fragment. The ball spike pusher was removed. Traction was removed. The fracture then reduced nicely. At this point was easily see there was a spike from a bisphosphonate type fracture. The C-arm was brought in a lateral position of the distal femur and placed 1 dynamic screw to allow compression at the fracture site. X-rays demonstrate fracture be in satisfactory alignment hardware in satisfactory position. Wounds were irrigated. Injected local anesthetic. Wounds were closed with 2-0 Vicryl and Prineo. Patient was then awakened turned to cover room in good condition. Postoperative plan  Weightbearing as tolerated. Dry dressings as necessary. See her in the office 8 weeks x-rays 2 views of the right femur at that time.     Electronically signed by Bernice Woodard MD on 4/21/2021 at 11:42 AM

## 2021-04-21 NOTE — PLAN OF CARE
Problem: Pain:  Goal: Pain level will decrease  Description: Pain level will decrease  4/21/2021 1632 by Sacha Ross RN  Outcome: Ongoing  Note: Pt report pain at 7 on scale. Pt states oral medication helping to achieve pain goal of a 4 on scale. Problem: Falls - Risk of:  Goal: Will remain free from falls  Description: Will remain free from falls  4/21/2021 1632 by Sacha Ross RN  Outcome: Ongoing  Note: Pt using call light appropriately to call for assistance with ambulation to the bathroom and to chair. Pt is also compliant with use of non-skid slippers. Pt reports understanding of fall prevention when discussed. Problem: Discharge Planning:  Goal: Discharged to appropriate level of care  Description: Discharged to appropriate level of care  4/21/2021 1632 by Sacha Rsos RN  Outcome: Ongoing  Note: Pt planning on home at time of discharge. Problem: Neurological  Goal: Maximum potential motor/sensory/cognitive function  Outcome: Ongoing  Note: Pt alert and oriented. Problem: Cardiovascular  Goal: No DVT, peripheral vascular complications  Outcome: Ongoing  Note: Pt without s/s of DVT. Pt able to take prescribed anticoagulants and SCD,S in place to help prevent development of DVT. Problem: Respiratory  Goal: No pulmonary complications  Outcome: Ongoing  Note: Pt sats >90 on room air. No shortness of breath noted. Lungs clear, uses IS, and able to C&DB  as ordered. Problem: GI  Goal: No bowel complications  Outcome: Ongoing  Note: No bowel movement this shift. Problem:   Goal: Adequate urinary output  Outcome: Ongoing  Note: Pt voiding adequate amounts this shift. Problem: Skin Integrity/Risk  Goal: No skin breakdown during hospitalization  Outcome: Ongoing  Note: Pt's right leg surgical incision healing. Dressing is dry and intact and not due to be changed today. No other skin impairments noted.  Pt understands the importance of frequent repositioning in order to prevent any skin breakdown. Care plan reviewed with patient. Patient verbalize understanding of the plan of care and contribute to goal setting.

## 2021-04-21 NOTE — ANESTHESIA PRE PROCEDURE
Department of Anesthesiology  Preprocedure Note       Name:  Mariela Treviño   Age:  66 y.o.  :  1950                                          MRN:  [id number]         Date:  2021      Surgeon: Ester Hernandes):  Brandan Jaime MD    Procedure: Procedure(s):  RIGHT FEMUR METATAN    Medications prior to admission:   Prior to Admission medications    Medication Sig Start Date End Date Taking?  Authorizing Provider   ibuprofen (ADVIL;MOTRIN) 200 MG tablet Take 200 mg by mouth every 6 hours as needed for Pain   Yes Historical Provider, MD   penicillin v potassium (VEETID) 500 MG tablet Take 500 mg by mouth 4 times daily   Yes Historical Provider, MD   metoprolol succinate (TOPROL XL) 25 MG extended release tablet Take 1 tablet by mouth daily 21  Yes Sebastián Millard MD   aspirin 81 MG EC tablet Take 81 mg by mouth daily   Yes Historical Provider, MD   alendronate (FOSAMAX) 70 MG tablet TAKE 1 TABLET EVERY 7 DAYS 20  Yes Ben Matamoros MD   losartan (COZAAR) 50 MG tablet TAKE 1 TABLET DAILY 20  Yes Ben Matamoros MD   ezetimibe (ZETIA) 10 MG tablet TAKE 1 TABLET DAILY (NEED TO SCHEDULE APPOINTMENT) 20  Yes Ben Matamoros MD   rosuvastatin (CRESTOR) 20 MG tablet TAKE 1 TABLET DAILY 20  Yes Ben Matamoros MD   Multiple Vitamins-Minerals (CENTRUM MULTI + OMEGA 3 PO) Take by mouth   Yes Historical Provider, MD   Calcium Carb-Cholecalciferol (CALTRATE 600+D3 SOFT PO) Take 1 tablet by mouth daily    Yes Historical Provider, MD       Current medications:    Current Facility-Administered Medications   Medication Dose Route Frequency Provider Last Rate Last Admin    ezetimibe (ZETIA) tablet 10 mg  10 mg Oral Nightly Alyx Gutiérrez MD        [Held by provider] losartan (COZAAR) tablet 50 mg  50 mg Oral Daily Alyx Gutiérrez MD        metoprolol succinate (TOPROL XL) extended release tablet 25 mg  25 mg Oral Daily Alyx Gutiérrez MD        rosuvastatin (CRESTOR) tablet 20 mg  20 mg Oral Daily Ava Shone, MD        labetalol (NORMODYNE;TRANDATE) injection syringe 5 mg  5 mg Intravenous Q4H PRN Ana Godoy MD        ceFAZolin (ANCEF) 2000 mg in dextrose 5 % 50 mL IVPB  2,000 mg Intravenous Q8H Liudmila Cornea, APRN - CNP        0.9 % sodium chloride infusion   Intravenous Continuous Louvella Lanius, PA-C 100 mL/hr at 04/21/21 1007 New Bag at 04/21/21 1007    morphine (PF) injection 2 mg  2 mg Intravenous Q2H PRN Louvella Lanius, PA-C   2 mg at 04/21/21 0740    HYDROcodone-acetaminophen (NORCO) 5-325 MG per tablet 1 tablet  1 tablet Oral Q4H PRN Louvella Lanius, PA-C        HYDROcodone-acetaminophen [facility]) 5-325 MG per tablet 2 tablet  2 tablet Oral Q4H PRN Louvella Lanius, PA-C   2 tablet at 04/21/21 0342    cyclobenzaprine (FLEXERIL) tablet 10 mg  10 mg Oral TID PRN Louvella Lanius, PA-C   10 mg at 04/21/21 0454       Allergies:     Allergies   Allergen Reactions    Demerol Hcl [Meperidine] Nausea And Vomiting     vomitting       Problem List:    Patient Active Problem List   Diagnosis Code    Osteopenia M85.80    Hypertension I10    Hyperlipidemia E78.5    Arthritis of right shoulder region M19.011    Premature supraventricular beat I49.1    Closed displaced oblique fracture of shaft of right femur ([facility] [address] S72.331A       Past Medical History:        Diagnosis Date    Hyperlipidemia     Hypertension     Osteopenia        Past Surgical History:        Procedure Laterality Date    BREAST SURGERY      Dr. Dennie Conch; Benign left breast lesion    COLONOSCOPY      Dr Chris Bradley   last 8-2017    INGUINAL HERNIA REPAIR      TUBAL LIGATION      VEIN SURGERY Right 08/08/2018    Endoveous Ablation       Social History:    Social History     Tobacco Use    Smoking status: Never Smoker    Smokeless tobacco: Never Used   Substance Use Topics    Alcohol use: Yes     Comment: occasional                                Counseling given: Not Answered      Vital Signs (Current):   Vitals:    04/20/21 2245 04/20/21 2345 04/21/21 0345 04/21/21 1000   BP: (!) 201/89 (!) 141/75 (!) 145/70 (!) 155/79   Pulse: 73 77 97 76   Resp: 16 16 14 16   Temp: 97.9 °F (36.6 °C) 97.8 °F (36.6 °C) 97.5 °F (36.4 °C) 97.8 °F (36.6 °C)   TempSrc: Oral Oral Oral Oral   SpO2: 96% 96% 97% 93%   Weight:       Height:                                                  BP Readings from Last 3 Encounters:   04/21/21 (!) 155/79   12/28/20 124/70   12/16/20 110/60       NPO Status:                                                                                 BMI:   Wt Readings from Last 3 Encounters:   04/20/21 152 lb (68.9 kg)   12/28/20 154 lb (69.9 kg)   12/16/20 152 lb (68.9 kg)     Body mass index is 26.93 kg/m². CBC:   Lab Results   Component Value Date    WBC 7.1 04/20/2021    RBC 4.17 04/20/2021    HGB 13.6 04/20/2021    HCT 42.4 04/20/2021    .7 04/20/2021     04/20/2021       CMP:   Lab Results   Component Value Date     04/20/2021    K 4.5 04/20/2021     04/20/2021    CO2 27 04/20/2021    BUN 21 04/20/2021    CREATININE 0.6 04/20/2021    LABGLOM >90 04/20/2021    GLUCOSE 125 04/20/2021    PROT 7.1 02/22/2019    CALCIUM 10.0 04/20/2021    BILITOT 0.4 02/22/2019    ALKPHOS 55 02/22/2019    AST 23 02/22/2019    ALT 20 02/22/2019       POC Tests: No results for input(s): POCGLU, POCNA, POCK, POCCL, POCBUN, POCHEMO, POCHCT in the last 72 hours.     Coags: No results found for: PROTIME, INR, APTT    HCG (If Applicable): No results found for: PREGTESTUR, PREGSERUM, HCG, HCGQUANT     ABGs: No results found for: PHART, PO2ART, YCS7UQG, ZFN1KAK, BEART, S8LOFILC     Type & Screen (If Applicable):  No results found for: LABABO, LABRH    Drug/Infectious Status (If Applicable):  No results found for: HIV, HEPCAB    COVID-19 Screening (If Applicable):   Lab Results   Component Value Date    COVID19 NOT DETECTED 04/20/2021           Anesthesia Evaluation  Patient summary reviewed and

## 2021-04-21 NOTE — PROGRESS NOTES
Lower incision by right knee is edematous, slightly hard to touch. Dressing off the edge of incision--oozing blood. + bruise noted at site (blood blister noted)   OR RN came and reapplied some perinio dressing at site. Pt nenita well. Pt denies c/o.

## 2021-04-21 NOTE — CONSULTS
Consult History & Physical      Patient:  Danya Boateng  YOB: 1950    MRN: [id number]     Acct: [de-identified]      Reason for Consult:  Pre-op Clearance    Date of Service: Pt seen/examined in consultation on 04/21/2021    History Of Present Illness:      66 y.o. female who we are asked to see/evaluate by Bernice Woodard MD for preoperative clearance. Patient tripped over a tree root and fell on the floor. She states that she was gardening before she fell. She was unable to stand up and bear any weight on her right leg so her  called EMS. Denies hitting her head or LOC. XR of femur demonstrates mildly comminuted displaces fracture of the proximal femur. Patient has a history of left knee replacement and bilateral shoulder replacements for osteoarthritis. Patient had a left shoulder replacement surgery 01/2021 which she was cleared for by Dr. Ab Sharp. Patient denies chest pain, shortness of breath, fever, chills, dizziness, abdominal pain. Patient states that she drinks 2 glasses of wine nightly. She denies smoking tobacco or using any illicit drugs. Patient states that she can walk up a few flights of stairs without shortness of breath or chest pain. Patient rides her stationary bicycle daily at home. Patient had a lexiscan stress test done on 09/28/2020 which was negative for ischemia. Past Medical History:        Diagnosis Date    Hyperlipidemia     Hypertension     Osteopenia        Past Surgical History:        Procedure Laterality Date    BREAST SURGERY      Dr. Amy Baires; Benign left breast lesion    COLONOSCOPY      Dr Phuc Tobin   last 8-2017    INGUINAL HERNIA REPAIR      TUBAL LIGATION      VEIN SURGERY Right 08/08/2018    Endoveous Ablation       Medications Prior to Admission:    Prior to Admission medications    Medication Sig Start Date End Date Taking?  Authorizing Provider   ibuprofen (ADVIL;MOTRIN) 200 MG tablet Take 200 mg by mouth every 6 hours as needed for Pain   Yes Historical Provider, MD   penicillin v potassium (VEETID) 500 MG tablet Take 500 mg by mouth 4 times daily   Yes Historical Provider, MD   metoprolol succinate (TOPROL XL) 25 MG extended release tablet Take 1 tablet by mouth daily 1/25/21  Yes Sia Adams MD   aspirin 81 MG EC tablet Take 81 mg by mouth daily   Yes Historical Provider, MD   alendronate (FOSAMAX) 70 MG tablet TAKE 1 TABLET EVERY 7 DAYS 7/14/20  Yes Carlos Liu MD   losartan (COZAAR) 50 MG tablet TAKE 1 TABLET DAILY 6/2/20  Yes Carlos Liu MD   ezetimibe (ZETIA) 10 MG tablet TAKE 1 TABLET DAILY (NEED TO SCHEDULE APPOINTMENT) 6/2/20  Yes Carlos Liu MD   rosuvastatin (CRESTOR) 20 MG tablet TAKE 1 TABLET DAILY 6/2/20  Yes Carlos Liu MD   Multiple Vitamins-Minerals (CENTRUM MULTI + OMEGA 3 PO) Take by mouth   Yes Historical Provider, MD   Calcium Carb-Cholecalciferol (CALTRATE 600+D3 SOFT PO) Take 1 tablet by mouth daily    Yes Historical Provider, MD       Allergies:  Demerol hcl [meperidine]    Social History:      The patient currently lives at home with her     TOBACCO:   reports that she has never smoked. She has never used smokeless tobacco.  ETOH:   reports current alcohol use. Family History:     Reviewed in detail and negative for DM, CAD, Cancer, CVA. Positive as follows:        Problem Relation Age of Onset    Colon Cancer Mother     Heart Failure Father     Heart Disease Sister     Heart Attack Brother     Stomach Cancer Paternal Grandfather        Diet:  Diet NPO, After Midnight    REVIEW OF SYSTEMS:   Pertinent positives as noted in the HPI. All other systems reviewed and negative.     PHYSICAL EXAM:  BP (!) 145/70   Pulse 97   Temp 97.5 °F (36.4 °C) (Oral)   Resp 14   Ht 5' 3\" (1.6 m)   Wt 152 lb (68.9 kg)   LMP  (LMP Unknown) Comment: menopausal   SpO2 97%   BMI 26.93 kg/m²   General appearance: No apparent distress, appears stated age and cooperative. HEENT: Normal cephalic, atraumatic without obvious deformity. Pupils equal, round, and reactive to light. Extra ocular muscles intact. Conjunctivae/corneas clear. Neck: Supple, with full range of motion. No jugular venous distention. Trachea midline. Respiratory:  Normal respiratory effort. Clear to auscultation, bilaterally without Rales/Wheezes/Rhonchi. Cardiovascular: Regular rate and rhythm with normal S1/S2 without murmurs, rubs or gallops. Abdomen: Soft, non-tender, non-distended with normal bowel sounds. Musculoskeletal: No clubbing, cyanosis or edema bilaterally. Skin: Skin color, texture, turgor normal.  No rashes or lesions. Neurologic:  Neurovascularly intact without any focal sensory/motor deficits. .  Psychiatric: Alert and oriented, thought content appropriate, normal insight  Capillary Refill: Brisk,< 3 seconds   Peripheral Pulses: +2 palpable, equal bilaterally     Labs:     Recent Labs     04/20/21  1714   WBC 7.1   HGB 13.6   HCT 42.4        Recent Labs     04/20/21  1714      K 4.5      CO2 27   BUN 21   CREATININE 0.6   CALCIUM 10.0     No results for input(s): AST, ALT, BILIDIR, BILITOT, ALKPHOS in the last 72 hours. No results for input(s): INR in the last 72 hours. No results for input(s): Xochilt Elder in the last 72 hours. Urinalysis:  No results found for: NITRU, WBCUA, BACTERIA, RBCUA, BLOODU, SPECGRAV, GLUCOSEU    Imaging:   XR FEMUR RIGHT (MIN 2 VIEWS)  Narrative: PROCEDURE: XR FEMUR RIGHT (MIN 2 VIEWS)    CLINICAL INFORMATION: fall, pain . COMPARISON: No prior study. TECHNIQUE: AP and lateral views of the right femur. FINDINGS: There is an obliquely oriented mildly comminuted fracture of the proximal femoral metadiaphysis with approximately one half shaft width displacement. The femoral head is normally located opposite the acetabulum. No distal fracture is evident.   Impression:  Mildly comminuted displaced fracture of the proximal femur. **This report has been created using voice recognition software. It may contain minor errors which are inherent in voice recognition technology. **    Final report electronically signed by Dr. Dorota Wisdom MD on 4/20/2021 5:31 PM  XR CHEST 1 VIEW  Narrative: PROCEDURE: XR CHEST 1 VIEW    CLINICAL INFORMATION: 49-year-old female presents for preoperative exam. Right femur fracture. COMPARISON: No prior study. TECHNIQUE: AP supine view of the chest was obtained. FINDINGS:   There are bilateral shoulder arthroplasties. There is cardiomegaly. There is mild pulmonary vascular congestion. There is no significant pleural effusion or pneumothorax. Visualized portions of the upper abdomen are within normal limits. The osseous structures are intact. No acute fractures or suspicious osseous lesions. Impression: Cardiomegaly with mild pulmonary vascular congestion. **This report has been created using voice recognition software. It may contain minor errors which are inherent in voice recognition technology. **    Final report electronically signed by Dr Torres Medina on 4/20/2021 5:14 PM        EKG:  I have reviewed the EKG. DVT prophylaxis: SCD's    Code Status: No Order    PT/OT Eval Status: Active and ongoing      ASSESSMENT:    Active Hospital Problems    Diagnosis Date Noted    Closed displaced oblique fracture of shaft of right femur ([facility] [address] Ankit Mujica 04/20/2021       PLAN:    1. Preoperative clearance: RCRI of 0. Patient has 3.9% 30 day of death, MI or cardiac arrest. Resume metoprolol since patient is already taking it as OP. 2. Primary Hypertension: Uncontrolled. Metoprolol resumed with parameters. Resume Cozaar after surgery. 3. Hyperlipidemia: Statin and ezetimibe resumed. 4. Osteopenia: Alendronate resumed. Thank you for the consultation.     Electronically signed by Haylee Webster MD on 4/21/2021 at 3:49 AM

## 2021-04-21 NOTE — PROGRESS NOTES
Dr Jil Baugh assistant at bedside (el)--wrapped leg in double ace wrap from top to bottom of right leg. Pt nenita well.

## 2021-04-21 NOTE — PROGRESS NOTES
Sand bg removed form right thigh   Right thigh appears less edematous, softer than upon arrival to PACU. Outer Side of right thigh remains edematous but soft. Pt denies c/o pain. Right outer knee incision w/ no leaking. Site remains edematous but appears the same as upon arrival to PACU. Manolo echeverria d/i. Ice pack in place.

## 2021-04-21 NOTE — PROGRESS NOTES
Physician Progress Note      PATIENT:               Mekhi Simms  CSN #:                  [id number]  :                       1950  ADMIT DATE:       2021 2:55 PM  [address] [place] DATE:  RESPONDING  PROVIDER #:        Clifton Perez MD          QUERY TEXT:    Pt admitted with right femur fracture. Pt noted to have hx of osteopenia. If   possible, please document in progress notes and discharge summary if you are   evaluating and/or treating any of the following: The medical record reflects the following:    Risk Factors: hx of osteopenia  Clinical Indicators: presents after falling and injuring right hip, pt with hx   of osteopenia, XR Closed displaced oblique fracture of shaft of right femur  Treatment: Alendronate, plans for ORIF    Thank you! Domenico Grant, YUE BenítezR  RN Clinical   P: [phone]  Options provided:  -- Pathological fracture of right femur due to osteopenia following fall which   would not usually break a normal, healthy bone  -- Pathological right femur fracture  -- Osteoporotic right femur Fracture  -- Traumatic right femur fracture  -- Other - I will add my own diagnosis  -- Disagree - Not applicable / Not valid  -- Disagree - Clinically unable to determine / Unknown  -- Refer to Clinical Documentation Reviewer    PROVIDER RESPONSE TEXT:    This patient has a pathologic fracture of right femur.     Query created by: Carolyne Tabor on 2021 9:39 AM      Electronically signed by:  Clifton Perez MD 2021 12:52 PM

## 2021-04-21 NOTE — H&P
TAKE 1 TABLET DAILY 6/2/20  Yes Ever Rae MD   ezetimibe (ZETIA) 10 MG tablet TAKE 1 TABLET DAILY (NEED TO SCHEDULE APPOINTMENT) 6/2/20  Yes Ever Rae MD   rosuvastatin (CRESTOR) 20 MG tablet TAKE 1 TABLET DAILY 6/2/20  Yes Ever Rae MD   Multiple Vitamins-Minerals (CENTRUM MULTI + OMEGA 3 PO) Take by mouth   Yes Historical Provider, MD   Calcium Carb-Cholecalciferol (CALTRATE 600+D3 SOFT PO) Take 1 tablet by mouth daily    Yes Historical Provider, MD    Scheduled Meds:   ceFAZolin  2,000 mg Intravenous On Call to OR    ezetimibe  10 mg Oral Nightly    [Held by provider] losartan  50 mg Oral Daily    metoprolol succinate  25 mg Oral Daily    rosuvastatin  20 mg Oral Daily     Continuous Infusions:   sodium chloride 100 mL/hr at 04/20/21 2228     PRN Meds:.labetalol, morphine, HYDROcodone 5 mg - acetaminophen, HYDROcodone 5 mg - acetaminophen, cyclobenzaprine    Allergies:  Demerol hcl [meperidine]    Social History:   Social History     Socioeconomic History    Marital status:      Spouse name: Not on file    Number of children: Not on file    Years of education: Not on file    Highest education level: Not on file   Occupational History    Not on file   Social Needs    Financial resource strain: Not on file    Food insecurity     Worry: Not on file     Inability: Not on file    Transportation needs     Medical: Not on file     Non-medical: Not on file   Tobacco Use    Smoking status: Never Smoker    Smokeless tobacco: Never Used   Substance and Sexual Activity    Alcohol use: Yes     Comment: occasional    Drug use: No    Sexual activity: Not on file   Lifestyle    Physical activity     Days per week: Not on file     Minutes per session: Not on file    Stress: Not on file   Relationships    Social connections     Talks on phone: Not on file     Gets together: Not on file     Attends Scientologist service: Not on file     Active member of club or organization: Not on file     Attends meetings of clubs or organizations: Not on file     Relationship status: Not on file    Intimate partner violence     Fear of current or ex partner: Not on file     Emotionally abused: Not on file     Physically abused: Not on file     Forced sexual activity: Not on file   Other Topics Concern    Not on file   Social History Narrative    Not on file     Social History     Tobacco Use   Smoking Status Never Smoker   Smokeless Tobacco Never Used     Social History     Substance and Sexual Activity   Alcohol Use Yes    Comment: occasional     Social History     Substance and Sexual Activity   Drug Use No       Family History:  Family History   Problem Relation Age of Onset    Colon Cancer Mother     Heart Failure Father     Heart Disease Sister     Heart Attack Brother     Stomach Cancer Paternal Grandfather        REVIEW OF SYSTEMS:  Constitutional: Denies any fever, chills. Derm: Denies any rash or skin color change. Eyes: Denies blurred or decreased in vision. Ent: Denies any tinnitus or vertigo. Resp: Denies any cough or shortness of breath. CV: Denies any syncope, palpitations or chest pain. GI:  Denies any abdominal pain, nausea, vomiting, constipation or diarrhea. : Denies any hematuria, hesitancy, or dysuria. Heme/Lymph: Denies any bleeding. Musculoskeletal: Positive for right hip pain  Neuro: Denies any dizziness, paresthesia or weakness.     PHYSICAL EXAM:  Patient Vitals for the past 24 hrs:   BP Temp Temp src Pulse Resp SpO2 Height Weight   04/21/21 0345 (!) 145/70 97.5 °F (36.4 °C) Oral 97 14 97 % -- --   04/20/21 2345 (!) 141/75 97.8 °F (36.6 °C) Oral 77 16 96 % -- --   04/20/21 2245 (!) 201/89 97.9 °F (36.6 °C) Oral 73 16 96 % -- --   04/20/21 2111 (!) 165/69 98.3 °F (36.8 °C) Oral 69 16 96 % -- --   04/20/21 2100 -- -- -- -- -- -- 5' 3\" (1.6 m) 152 lb (68.9 kg)   04/20/21 1927 (!) 145/78 -- -- 80 13 98 % -- --   04/20/21 1837 (!) 159/81 -- -- 69 10 94 % -- -- 04/20/21 1822 (!) 160/86 -- -- 82 13 98 % -- --   04/20/21 1820 -- -- -- -- -- -- 5' 3\" (1.6 m) 150 lb (68 kg)   04/20/21 1501 (!) 173/100 98 °F (36.7 °C) Oral 79 16 99 % -- --     General appearance:  Alert and oriented x 3. No apparent distress, appears stated age and cooperative. HEENT:  Normal cephalic, atraumatic without obvious deformity. Neck: Supple, with full range of motion. Respiratory:  Normal respiratory effort. No audible Wheezes or Rhonchi. Cardiovascular:  Regular rate and rhythm. Musculoskeletal:   RLE skin intact, no swelling or ecchymosis. TTP lateral thigh. Szymanski's traction in place. Good sensation and brisk cap refill to toes. DP/PT pulses 2+. Skin: Skin color, texture, turgor normal.  No rashes or lesions. Neurologic:  Neurovascularly intact without any focal sensory/motor deficits. Sensation intact. DATA:  CBC:   Lab Results   Component Value Date    WBC 7.1 04/20/2021    HGB 13.6 04/20/2021     04/20/2021     BMP:    Lab Results   Component Value Date     04/20/2021    K 4.5 04/20/2021     04/20/2021    CO2 27 04/20/2021    BUN 21 04/20/2021    CREATININE 0.6 04/20/2021    CALCIUM 10.0 04/20/2021    GLUCOSE 125 04/20/2021     PT/INR:  No results found for: PROTIME, INR  Troponin:  No results found for: TROPONINI  No results for input(s): LIPASE, AMYLASE in the last 72 hours. No results for input(s): AST, ALT, BILIDIR, BILITOT, ALKPHOS in the last 72 hours. Radiology:  Xr Femur Right (min 2 Views)    Result Date: 4/20/2021  PROCEDURE: XR FEMUR RIGHT (MIN 2 VIEWS) CLINICAL INFORMATION: fall, pain . COMPARISON: No prior study. TECHNIQUE: AP and lateral views of the right femur. FINDINGS: There is an obliquely oriented mildly comminuted fracture of the proximal femoral metadiaphysis with approximately one half shaft width displacement. The femoral head is normally located opposite the acetabulum. No distal fracture is evident.       Mildly comminuted displaced fracture of the proximal femur. **This report has been created using voice recognition software. It may contain minor errors which are inherent in voice recognition technology. ** Final report electronically signed by Dr. Magalie Jiménez MD on 4/20/2021 5:31 PM    Xr Chest 1 View    Result Date: 4/20/2021  PROCEDURE: XR CHEST 1 VIEW CLINICAL INFORMATION: 68-year-old female presents for preoperative exam. Right femur fracture. COMPARISON: No prior study. TECHNIQUE: AP supine view of the chest was obtained. FINDINGS: There are bilateral shoulder arthroplasties. There is cardiomegaly. There is mild pulmonary vascular congestion. There is no significant pleural effusion or pneumothorax. Visualized portions of the upper abdomen are within normal limits. The osseous structures are intact. No acute fractures or suspicious osseous lesions. Cardiomegaly with mild pulmonary vascular congestion. **This report has been created using voice recognition software. It may contain minor errors which are inherent in voice recognition technology. ** Final report electronically signed by Dr Sumi Thompson on 4/20/2021 5:14 PM      ASSESSMENT:Active Problems:    Closed displaced oblique fracture of shaft of right femur ([address]  Resolved Problems:    * No resolved hospital problems. *       PLAN as discussed with Dr. Roselia Chen:  Surgery today for ORIF right femur. RBAs discussed with the patient and they consent. Medicine for risk stratification  Ancef pre-op. Consent for surgery. Continue medical management.                Electronically signed by JULIANNA Stoner CNP on 4/21/2021 at 8:53 AM

## 2021-04-21 NOTE — FLOWSHEET NOTE
Pt was anointed     04/21/21 1738   Encounter Summary   Services provided to: Patient   Referral/Consult From: Rounding   Place of [address] Visiting Yes  (4/21)   Complexity of Encounter Low   Length of Encounter 15 minutes   Routine   Type Initial   Assessment Approachable;Calm   Intervention Nurtured hope;Prayer;[place]; Active listening;Empowerment   Outcome Acceptance;Expressed gratitude;Encouraged; Hopeful;Receptive   Sacraments   Sacrament of Sick-Anointing Anointed

## 2021-04-21 NOTE — PROGRESS NOTES
Transported to floor in stable condition. 2lpm per N/C on pt. Denies c/o. No change in circ checks to right foot/leg.

## 2021-04-22 LAB
ANION GAP SERPL CALCULATED.3IONS-SCNC: 7 MEQ/L (ref 8–16)
BASOPHILS # BLD: 0.1 %
BASOPHILS ABSOLUTE: 0 THOU/MM3 (ref 0–0.1)
BUN BLDV-MCNC: 12 MG/DL (ref 7–22)
CALCIUM SERPL-MCNC: 8 MG/DL (ref 8.5–10.5)
CHLORIDE BLD-SCNC: 106 MEQ/L (ref 98–111)
CO2: 25 MEQ/L (ref 23–33)
CREAT SERPL-MCNC: 0.5 MG/DL (ref 0.4–1.2)
EOSINOPHIL # BLD: 0 %
EOSINOPHILS ABSOLUTE: 0 THOU/MM3 (ref 0–0.4)
ERYTHROCYTE [DISTWIDTH] IN BLOOD BY AUTOMATED COUNT: 13.4 % (ref 11.5–14.5)
ERYTHROCYTE [DISTWIDTH] IN BLOOD BY AUTOMATED COUNT: 50.6 FL (ref 35–45)
FOLATE: 17.4 NG/ML (ref 4.8–24.2)
GFR SERPL CREATININE-BSD FRML MDRD: > 90 ML/MIN/1.73M2
GLUCOSE BLD-MCNC: 131 MG/DL (ref 70–108)
HCT VFR BLD CALC: 32 % (ref 37–47)
HEMOGLOBIN: 10 GM/DL (ref 12–16)
IMMATURE GRANS (ABS): 0.03 THOU/MM3 (ref 0–0.07)
IMMATURE GRANULOCYTES: 0.4 %
LYMPHOCYTES # BLD: 6.6 %
LYMPHOCYTES ABSOLUTE: 0.5 THOU/MM3 (ref 1–4.8)
MAGNESIUM: 1.9 MG/DL (ref 1.6–2.4)
MCH RBC QN AUTO: 32.3 PG (ref 26–33)
MCHC RBC AUTO-ENTMCNC: 31.3 GM/DL (ref 32.2–35.5)
MCV RBC AUTO: 103.2 FL (ref 81–99)
MONOCYTES # BLD: 6.8 %
MONOCYTES ABSOLUTE: 0.5 THOU/MM3 (ref 0.4–1.3)
NUCLEATED RED BLOOD CELLS: 0 /100 WBC
PLATELET # BLD: 148 THOU/MM3 (ref 130–400)
PMV BLD AUTO: 12 FL (ref 9.4–12.4)
POTASSIUM SERPL-SCNC: 4.5 MEQ/L (ref 3.5–5.2)
RBC # BLD: 3.1 MILL/MM3 (ref 4.2–5.4)
SEG NEUTROPHILS: 86.1 %
SEGMENTED NEUTROPHILS ABSOLUTE COUNT: 6.5 THOU/MM3 (ref 1.8–7.7)
SODIUM BLD-SCNC: 138 MEQ/L (ref 135–145)
VITAMIN B-12: 408 PG/ML (ref 211–911)
WBC # BLD: 7.5 THOU/MM3 (ref 4.8–10.8)

## 2021-04-22 PROCEDURE — 82607 VITAMIN B-12: CPT

## 2021-04-22 PROCEDURE — 6370000000 HC RX 637 (ALT 250 FOR IP): Performed by: FAMILY MEDICINE

## 2021-04-22 PROCEDURE — 2580000003 HC RX 258: Performed by: NURSE PRACTITIONER

## 2021-04-22 PROCEDURE — 1200000000 HC SEMI PRIVATE

## 2021-04-22 PROCEDURE — 6370000000 HC RX 637 (ALT 250 FOR IP): Performed by: NURSE PRACTITIONER

## 2021-04-22 PROCEDURE — 36415 COLL VENOUS BLD VENIPUNCTURE: CPT

## 2021-04-22 PROCEDURE — 97163 PT EVAL HIGH COMPLEX 45 MIN: CPT

## 2021-04-22 PROCEDURE — 82746 ASSAY OF FOLIC ACID SERUM: CPT

## 2021-04-22 PROCEDURE — 99232 SBSQ HOSP IP/OBS MODERATE 35: CPT | Performed by: FAMILY MEDICINE

## 2021-04-22 PROCEDURE — 97116 GAIT TRAINING THERAPY: CPT

## 2021-04-22 PROCEDURE — 6360000002 HC RX W HCPCS: Performed by: NURSE PRACTITIONER

## 2021-04-22 PROCEDURE — 80048 BASIC METABOLIC PNL TOTAL CA: CPT

## 2021-04-22 PROCEDURE — 83735 ASSAY OF MAGNESIUM: CPT

## 2021-04-22 PROCEDURE — 85025 COMPLETE CBC W/AUTO DIFF WBC: CPT

## 2021-04-22 RX ORDER — POLYETHYLENE GLYCOL 3350 17 G/17G
17 POWDER, FOR SOLUTION ORAL DAILY
Status: DISCONTINUED | OUTPATIENT
Start: 2021-04-22 | End: 2021-04-23 | Stop reason: HOSPADM

## 2021-04-22 RX ORDER — HYDROCODONE BITARTRATE AND ACETAMINOPHEN 5; 325 MG/1; MG/1
1-2 TABLET ORAL
Qty: 42 TABLET | Refills: 0 | Status: SHIPPED | OUTPATIENT
Start: 2021-04-22 | End: 2021-04-29

## 2021-04-22 RX ORDER — CYCLOBENZAPRINE HCL 10 MG
10 TABLET ORAL 3 TIMES DAILY PRN
Qty: 40 TABLET | Refills: 0 | Status: SHIPPED | OUTPATIENT
Start: 2021-04-22 | End: 2021-05-02

## 2021-04-22 RX ORDER — RIVAROXABAN 10 MG/1
10 TABLET, FILM COATED ORAL EVERY 24 HOURS
Qty: 18 TABLET | Refills: 0 | Status: SHIPPED | OUTPATIENT
Start: 2021-04-22 | End: 2021-04-27 | Stop reason: SINTOL

## 2021-04-22 RX ADMIN — METOPROLOL SUCCINATE 25 MG: 25 TABLET, FILM COATED, EXTENDED RELEASE ORAL at 09:35

## 2021-04-22 RX ADMIN — RIVAROXABAN 10 MG: 10 TABLET, FILM COATED ORAL at 17:38

## 2021-04-22 RX ADMIN — CEFAZOLIN 2000 MG: 10 INJECTION, POWDER, FOR SOLUTION INTRAVENOUS at 01:55

## 2021-04-22 RX ADMIN — HYDROCODONE BITARTRATE AND ACETAMINOPHEN 2 TABLET: 5; 325 TABLET ORAL at 03:52

## 2021-04-22 RX ADMIN — HYDROCODONE BITARTRATE AND ACETAMINOPHEN 2 TABLET: 5; 325 TABLET ORAL at 20:47

## 2021-04-22 RX ADMIN — CEFAZOLIN 2000 MG: 10 INJECTION, POWDER, FOR SOLUTION INTRAVENOUS at 09:35

## 2021-04-22 RX ADMIN — HYDROCODONE BITARTRATE AND ACETAMINOPHEN 1 TABLET: 5; 325 TABLET ORAL at 09:35

## 2021-04-22 RX ADMIN — ROSUVASTATIN CALCIUM 20 MG: 20 TABLET, FILM COATED ORAL at 09:35

## 2021-04-22 RX ADMIN — DOCUSATE SODIUM 100 MG: 100 CAPSULE, LIQUID FILLED ORAL at 09:35

## 2021-04-22 RX ADMIN — POLYETHYLENE GLYCOL 3350 17 G: 17 POWDER, FOR SOLUTION ORAL at 17:37

## 2021-04-22 RX ADMIN — CYCLOBENZAPRINE HYDROCHLORIDE 10 MG: 10 TABLET, FILM COATED ORAL at 15:05

## 2021-04-22 RX ADMIN — HYDROCODONE BITARTRATE AND ACETAMINOPHEN 2 TABLET: 5; 325 TABLET ORAL at 15:44

## 2021-04-22 ASSESSMENT — PAIN SCALES - GENERAL
PAINLEVEL_OUTOF10: 1
PAINLEVEL_OUTOF10: 3
PAINLEVEL_OUTOF10: 0
PAINLEVEL_OUTOF10: 7
PAINLEVEL_OUTOF10: 0
PAINLEVEL_OUTOF10: 4
PAINLEVEL_OUTOF10: 7
PAINLEVEL_OUTOF10: 3
PAINLEVEL_OUTOF10: 0

## 2021-04-22 NOTE — PROGRESS NOTES
Hospitalist Progress Note    Patient:  Anthony Freeman      Unit/Bed:7K-12/012-A    YOB: 1950    MRN: [id number]       Acct: [de-identified]     PCP: Brooklyn Trivedi MD    Date of Admission: 4/20/2021    Chief Complaint: f/u for HTN    Hospital Course:     Per initial hospitalist consult note dated 4/21/2021:    \" 66 y.o. female who we are asked to see/evaluate by Jeannine Saavedra MD for preoperative clearance. Patient tripped over a tree root and fell on the floor. She states that she was gardening before she fell. She was unable to stand up and bear any weight on her right leg so her  called EMS. Denies hitting her head or LOC. XR of femur demonstrates mildly comminuted displaces fracture of the proximal femur. Patient has a history of left knee replacement and bilateral shoulder replacements for osteoarthritis. Patient had a left shoulder replacement surgery 01/2021 which she was cleared for by Dr. Demetra Harrington. Patient denies chest pain, shortness of breath, fever, chills, dizziness, abdominal pain. Patient states that she drinks 2 glasses of wine nightly. She denies smoking tobacco or using any illicit drugs. Patient states that she can walk up a few flights of stairs without shortness of breath or chest pain. Patient rides her stationary bicycle daily at home. Patient had a lexiscan stress test done on 09/28/2020 which was negative for ischemia. \"      Subjective:     Patient seen and examined. Pt reports that she feels fine today. She reports intermittent 3/10 right thigh pain pain, occur on movement. She denies chest pain, sob, palpitations. Last BM 2 days ago per pt. She denies abd pain, N/V. Passing flatus per pt.        Medications:  Reviewed    Infusion Medications    sodium chloride 100 mL/hr at 04/21/21 2106     Scheduled Medications    polyethylene glycol  17 g Oral Daily    ezetimibe  10 mg Oral Nightly    losartan  50 mg Oral Daily    metoprolol succinate  25 mg Oral found for: Elinor Danyell, BACTERIA, RBCUA, BLOODU, SPECGRAV, GLUCOSEU    Radiology:  [facility] FOR SURGICAL PROCEDURES   Final Result      XR FEMUR RIGHT (MIN 2 VIEWS)   Final Result   FINDINGS/IMPRESSION:   There has been interval placement of an intramedullary nail bridging the proximal femoral fracture with cross third screws traversing the femoral neck and fixation screws at the distal aspect of the femur. There is no evidence of hardware failure. There    is anatomic alignment. Please refer to the operative report for further information. **This report has been created using voice recognition software. It may contain minor errors which are inherent in voice recognition technology. **      Final report electronically signed by Dr. Clark Angel MD on 4/21/2021 1:49 PM      XR CHEST 1 VIEW   Final Result   Cardiomegaly with mild pulmonary vascular congestion. **This report has been created using voice recognition software. It may contain minor errors which are inherent in voice recognition technology. **      Final report electronically signed by Dr Yolanda Gomez on 4/20/2021 5:14 PM      XR FEMUR RIGHT (MIN 2 VIEWS)   Final Result    Mildly comminuted displaced fracture of the proximal femur. **This report has been created using voice recognition software. It may contain minor errors which are inherent in voice recognition technology. **      Final report electronically signed by Dr. Clark Angel MD on 4/20/2021 5:31 PM          Diet: DIET GENERAL;      Assessment/Plan:      Active Hospital Problems    Diagnosis Date Noted    Closed displaced oblique fracture of shaft of right femur [facility]) Annelise Ramirezi 04/20/2021     -Primary team orthopedics managing  -Pain management per primary team    Hypertensive urgency, resolved  Essential hypertension, uncontrolled    -/89 on 4/20/2021 at 2245. BP better today, SBP ranging 110s to 120s.   -Continue Toprol-XL, resume losartan. -DC IVF once okay with primary service   -Vital signs per protocol    Hyperlipidemia    -Continue Zetia and Crestor    Hyperglycemia    -No history of DM  -Check A1c in a.m. Acute microcytic anemia, likely hemodilution secondary to IV fluids    -Normal folate and B12  -DC IV fluids  -H&H in a.m.         DVT prophylaxis: [] Lovenox                                 [] SCDs                                 [] SQ Heparin                                 [] Encourage ambulation           [x] Already on Anticoagulation     Disposition:    [] Home       [] TCU       [] Rehab       [] Psych       [] SNF       [] [place]       [x] Other-per primary team.       Electronically signed by Nicole Saez MD on 4/22/2021 at 10:31 AM

## 2021-04-22 NOTE — PROGRESS NOTES
Orthopaedic Progress Note      SUBJECTIVE   Ms. Annelise Amezquita is post op day # 1 s/p Open treatment right subtrochanteric femur fracture with a cephalomedullary nail   hgb 10  Right leg incision small amount of drainage noted, swelling to R leg   Pt states pain is controlled   Pt plans to go home at discharge with [facility] therapy   Pt has not worked with therapy yet       Allergies: Allergies as of 04/20/2021 - Review Complete 04/20/2021   Allergen Reaction Noted    Demerol hcl [meperidine] Nausea And Vomiting 03/01/2018     Current Inpatient Medications:  Current Facility-Administered Medications: ezetimibe (ZETIA) tablet 10 mg, 10 mg, Oral, Nightly  [Held by provider] losartan (COZAAR) tablet 50 mg, 50 mg, Oral, Daily  metoprolol succinate (TOPROL XL) extended release tablet 25 mg, 25 mg, Oral, Daily  rosuvastatin (CRESTOR) tablet 20 mg, 20 mg, Oral, Daily  labetalol (NORMODYNE;TRANDATE) injection syringe 5 mg, 5 mg, Intravenous, Q4H PRN  ceFAZolin (ANCEF) 2000 mg in dextrose 5 % 50 mL IVPB, 2,000 mg, Intravenous, Q8H  rivaroxaban (XARELTO) tablet 10 mg, 10 mg, Oral, Daily  docusate sodium (COLACE) capsule 100 mg, 100 mg, Oral, Daily  0.9 % sodium chloride infusion, , Intravenous, Continuous  morphine (PF) injection 2 mg, 2 mg, Intravenous, Q2H PRN  HYDROcodone-acetaminophen (NORCO) 5-325 MG per tablet 1 tablet, 1 tablet, Oral, Q4H PRN  HYDROcodone-acetaminophen (NORCO) 5-325 MG per tablet 2 tablet, 2 tablet, Oral, Q4H PRN  cyclobenzaprine (FLEXERIL) tablet 10 mg, 10 mg, Oral, TID PRN    REVIEW OF SYSTEMS:  Constitutional: Denies any fever, chills. Derm: Denies any rash or skin color change. Musculoskeletal: Denies numbness and tingling RLE, Pain to R leg. Neuro: Denies any dizziness, paresthesia or weakness.     OBJECTIVE    Patient Vitals for the past 24 hrs:   BP Temp Temp src Pulse Resp SpO2   04/22/21 0416 116/65 98.1 °F (36.7 °C) Oral 66 16 94 %   04/22/21 0347 119/63 97.7 °F (36.5 °C) Oral 75 14 93 % 04/21/21 2345 (!) 135/58 97.8 °F (36.6 °C) Oral 69 12 94 %   04/21/21 2045 128/72 98.1 °F (36.7 °C) Oral 79 14 94 %   04/21/21 1505 133/88 98 °F (36.7 °C) Oral 74 16 95 %   04/21/21 1340 (!) 144/77 -- -- 69 16 92 %   04/21/21 1330 139/73 -- -- 77 16 92 %   04/21/21 1315 (!) 141/73 97.7 °F (36.5 °C) Oral 82 16 96 %   04/21/21 1235 (!) 158/80 97.6 °F (36.4 °C) Temporal 74 16 96 %   04/21/21 1225 (!) 159/86 -- -- 72 16 98 %   04/21/21 1220 (!) 171/78 -- -- 75 16 98 %   04/21/21 1215 (!) 160/83 -- -- 77 12 96 %   04/21/21 1210 (!) 164/82 -- -- 79 16 97 %   04/21/21 1205 (!) 166/73 -- -- 82 12 97 %   04/21/21 1200 (!) 168/78 -- -- 95 14 94 %   04/21/21 1157 133/77 98 °F (36.7 °C) Temporal 96 20 (!) 87 %   04/21/21 1000 (!) 155/79 97.8 °F (36.6 °C) Oral 76 16 93 %     INTAKE/OUTPUT:    Intake/Output Summary (Last 24 hours) at 4/22/2021 0833  Last data filed at 4/22/2021 0416  Gross per 24 hour   Intake 3138.72 ml   Output 850 ml   Net 2288.72 ml     I/O last 3 completed shifts: In: 2418.7 [P.O.:150; I.V.:2268.7]  Out: 850 [Urine:750; Blood:100]    PHYSICAL EXAM:  General appearance:  Alert and oriented x 3. No apparent distress, appears stated age and cooperative. Musculoskeletal: RLE:  Denies calf pain to palpation. decreased range of motion without deformity. Pt can flex and extend right toes. Right leg drsg dry and intact- small amount of drainage. No redness or active drainage noted from incision site. Skin: Skin color normal.  No rashes or lesions. Neurologic:  Neurovascularly intact without any focal sensory/motor deficits. Sensation intact.        Data  CBC:   Lab Results   Component Value Date    WBC 7.5 04/22/2021    HGB 10.0 04/22/2021     04/22/2021     BMP:    Lab Results   Component Value Date     04/22/2021    K 4.5 04/22/2021     04/22/2021    CO2 25 04/22/2021    BUN 12 04/22/2021    CREATININE 0.5 04/22/2021    CALCIUM 8.0 04/22/2021    GLUCOSE 131 04/22/2021     Uric Acid:  No components found for: URIC  PT/INR:    Lab Results   Component Value Date    INR 1.02 04/21/2021     Troponin:  No results found for: TROPONINI  Urine Culture:  No components found for: CHRISTOPH    ASSESSMENT:  Active Hospital Problems    Diagnosis Date Noted    Closed displaced oblique fracture of shaft of right femur ([address] Bre [place]on 04/20/2021       PLAN  1. Dry drsg changes as needed   2. WBAT  3. PT/OT to eval and treat   4. Continue current medical management   5.  Plan for discharge to home tomorrow       Electronically signed by JULIANNA An CNP on 4/22/2021 at 7:51 AM

## 2021-04-22 NOTE — PLAN OF CARE
Problem: Pain:  Goal: Pain level will decrease  Description: Pain level will decrease  Outcome: Ongoing  Note: Patient reports pain at a 7-8 on scale. Patient states oral and IV medication helping to achieve pain goal of a 3 on scale. Patient able to tolerate ice to right hip. Goal: Control of acute pain  Description: Control of acute pain  Outcome: Ongoing  Goal: Control of chronic pain  Description: Control of chronic pain  Outcome: Ongoing     Problem: Falls - Risk of:  Goal: Will remain free from falls  Description: Will remain free from falls  Outcome: Ongoing  Note: Patient using call light appropriately to call for assistance. Patient to be evaluated by PT and OT today. Patient reports understanding of fall prevention when discussed. Bed alarm remains in place. Goal: Absence of physical injury  Description: Absence of physical injury  Outcome: Ongoing  Note: Patient remains free from physical injury at this time. Problem: Discharge Planning:  Goal: Discharged to appropriate level of care  Description: Discharged to appropriate level of care  Outcome: Ongoing  Note: Patient plans home vs ECF at discharge. Case management assisting with discharge planning. Problem: Neurological  Goal: Maximum potential motor/sensory/cognitive function  Outcome: Ongoing  Note: Patient is alert and oriented x4, denies any numbness or tingling, and skin is warm and dry. Problem: Cardiovascular  Goal: No DVT, peripheral vascular complications  Outcome: Ongoing  Note: Patient without s/s of DVT. Patient able to take prescribed anticoagulants and wear SCDs to help prevent development of DVT. Problem: Respiratory  Goal: No pulmonary complications  Outcome: Ongoing  Note: Patient denies any shortness of breath and remains on room air. Problem: GI  Goal: No bowel complications  Outcome: Ongoing  Note: Patient with bowel sounds, passing flatus, and without nausea.  Taking prescribed medications to assist with ORI.     Problem:   Goal: Adequate urinary output  Outcome: Ongoing  Note: Patient has adequate urinary output from duvall catheter at this time. Denies any suprapubic pain. Problem: Skin Integrity/Risk  Goal: No skin breakdown during hospitalization  Outcome: Ongoing  Note: Patients dressings to right leg surgical incisions are dry and intact and not due to be changed today. Scattered bruising noted in various stages of healing. Patient understands the importance of frequent repositioning in order to prevent any skin breakdown. Care plan reviewed with patient and . Patient and  verbalize understanding of the plan of care and contribute to goal setting.

## 2021-04-22 NOTE — PLAN OF CARE
Problem: Pain:  Goal: Pain level will decrease  Description: Pain level will decrease  4/22/2021 1654 by Melody Min RN  Outcome: Ongoing  Note: Patient complains of pain in the right leg with a rating of 6-7 on 0-10 scale. PRN pain medication given as ordered along with ice packs. Patient's stated pain goal is 5. Goal: Control of acute pain  Description: Control of acute pain  4/22/2021 1654 by Melody Min RN  Outcome: Ongoing  Note: Patient complains of pain in the right leg with a rating of 6-7 on 0-10 scale. PRN pain medication given as ordered along with ice packs. Patient's stated pain goal is 5. Goal: Control of chronic pain  Description: Control of chronic pain  4/22/2021 1654 by Melody Min RN  Outcome: Ongoing  Note: Patient complains of pain in the right leg with a rating of 6-7 on 0-10 scale. PRN pain medication given as ordered along with ice packs. Patient's stated pain goal is 5. Problem: Falls - Risk of:  Goal: Will remain free from falls  Description: Will remain free from falls  4/22/2021 1654 by Melody Min RN  Outcome: Ongoing  Note: Patient has remained free of falls during this shift. Appropriate fall prevention measures in place. Patient is compliant with using call light for assistance when needed. Goal: Absence of physical injury  Description: Absence of physical injury  4/22/2021 1654 by Melody Min RN  Outcome: Ongoing  Note: Patient has remained free of physical injury during this shift. Safe environment provided, call light within reach, and hourly rounding completed. Problem: Discharge Planning:  Goal: Discharged to appropriate level of care  Description: Discharged to appropriate level of care  4/22/2021 1654 by Melody Min RN  Outcome: Ongoing  Note: Discharge planning in progress. Patient is planning to return home with  at discharge. Case management assisting with discharge needs.         Problem: Neurological  Goal: Maximum potential motor/sensory/cognitive function  4/22/2021 1654 by Neema Patterson RN  Outcome: Ongoing  Note: Patient is alert and oriented x 4. Neuro assessment within normal limits. Problem: Cardiovascular  Goal: No DVT, peripheral vascular complications  0/86/9711 1654 by Neema Patterson RN  Outcome: Ongoing  Note: Patient without S/SX of DVT. Patient has teds and SCD in place to help prevent development of DVT. Problem: Respiratory  Goal: No pulmonary complications  0/33/3119 1654 by Neema Patterson RN  Outcome: Ongoing  Note: Lung sounds clear throughout. O2 sats remain greater than 90%. Problem: GI  Goal: No bowel complications  8/86/5174 1654 by Neema Patterson RN  Outcome: Ongoing  Note: Bowel sounds active x 4. Patient is passing gas. No BM during this shift. Problem:   Goal: Adequate urinary output  4/22/2021 1654 by Neema Patterson RN  Outcome: Ongoing  Note: Patient has voided adequate amounts of clear, yellow urine during this shift. Problem: Skin Integrity/Risk  Goal: No skin breakdown during hospitalization  4/22/2021 1654 by Neema Patterson RN  Outcome: Ongoing  Note: No new skin issues noted during this shift. See skin assessment. Patient is able to reposition independently. Care plan reviewed with patient. Patient verbalize understanding of the plan of care and contribute to goal setting.

## 2021-04-22 NOTE — PROGRESS NOTES
[facility]  INPATIENT OCCUPATIONAL THERAPY  [facility] ORTHOPEDICS 7K  EVALUATION    Time:   Time In: 2591  Time Out: 1420  Timed Code Treatment Minutes: 18 Minutes  Minutes: 26    Date: 2021  Patient Name: Kelly Morales,   Gender: female      MRN: [id number]  : 1950  (66 y.o.)  Referring Practitioner: JULIANNA An CNP  Diagnosis: Closed Displaced Oblique Fracture of Shaft of Right Femur  Additional Pertinent Hx: Per H&P \"The patient is a 66 y.o. female with a past medical history significant for HLD, HTN, and OA who ortho admitted for definitive treatment of a right subtroch femur fracture sustained in a fall. Patient states was gardening yesterday and tripped over a root falling onto the right hip. She heard a pop and felt severe pain. She denies other injuries and no LOC. She was not able to get off the ground and ambulate after the fall. She denies numbness and tingling. 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 Xray confirms the fracture. \"    Restrictions/Precautions:  Restrictions/Precautions: General Precautions, Fall Risk    Subjective  Chart Reviewed: Yes, Orders, Progress Notes, History and Physical, Operative Notes  Patient assessed for rehabilitation services?: Yes    Subjective: RN okayed OT session. Upon arrival patient was sitting up in bed. Pt was agreeable to OT session.     Pain:  Pain Assessment  Patient Currently in Pain: Denies    Vitals: Vitals not assessed per clinical judgement, see nursing flowsheet    Social/Functional History:  Lives With: Spouse  Type of Home: House  Home Layout: One level  Home Access: Stairs to enter without rails  Entrance Stairs - Number of Steps: 2  Home Equipment: Rolling walker, Cane   Bathroom Shower/Tub: Walk-in shower  Bathroom Toilet: Handicap height  Bathroom Accessibility: Accessible     ADL Assistance: Independent  Homemaking Assistance: Independent  Homemaking Responsibilities: Yes  Ambulation Assistance: Independent  Transfer Assistance: Independent    Active : Yes  Occupation: Retired  Additional Comments: ind with ambulation prior to admission    VISION:WNL    HEARING:  WFL    COGNITION: WFL    RANGE OF MOTION:  Bilateral Upper Extremity:  WFL    STRENGTH:  Bilateral Upper Extremity:  WFL    SENSATION:   WFL    ADL:   Grooming: Contact Guard Assistance. standing at sink to complete oral care, wash/dry face, hand hygiene. Lower Extremity Dressing: Maximum Assistance. Aida Greg BALANCE:  Sitting Balance:  Stand By Assistance. Standing Balance: Contact Guard Assistance. BED MOBILITY:  Supine to Sit: Minimal Assistance, with increased time for completion    Sit to Supine: Minimal Assistance, with increased time for completion    Scooting: Contact Guard Assistance      TRANSFERS:  Sit to Stand:  [employer], with increased time for completion, cues for hand placement. Stand to Sit: [employer], with increased time for completion, cues for hand placement. FUNCTIONAL MOBILITY:  Assistive Device: Rolling Walker  Assist Level:  Contact Guard Assistance. Distance: To and from bathroom  Slow pace, No LOB. Activity Tolerance:  Patient tolerance of  treatment: good. Assessment:  Assessment: This 66year old female is s/p ORIF R femur. Pt requires skilled OT intervention to increase indep and safety with all self cares, transfers, mobility, and IADLs to decrease fall risk and return to Indep PLOF. Without skilled OT intervention pt is at increased risk for falls.   Performance deficits / Impairments: Decreased functional mobility , Decreased ADL status, Decreased strength, Decreased safe awareness, Decreased endurance, Decreased high-level IADLs  Prognosis: Good  REQUIRES OT FOLLOW UP: Yes    Treatment Initiated: Treatment and education initiated within context of evaluation. Evaluation time included review of current medical information, gathering information related to past medical, social and functional history, completion of standardized testing, formal and informal observation of tasks, assessment of data and development of plan of care and goals. Treatment time included skilled education and facilitation of tasks to increase safety and independence with ADL's for improved functional independence and quality of life. Discharge Recommendations:  Continue to assess pending progress    Patient Education:  OT Education: OT Role, Plan of Care, Precautions, ADL Adaptive Strategies, Transfer Training    Equipment Recommendations:  Equipment Needed: No    Plan:  Times per week: 6x  Current Treatment Recommendations: Strengthening, Balance Training, Functional Mobility Training, Endurance Training, Safety Education & Training, Self-Care / ADL, Patient/Caregiver Education & Training, Home Management Training. See long-term goal time frame for expected duration of plan of care. If no long-term goals established, a short length of stay is anticipated. Goals:  Patient goals : Go home  Short term goals  Time Frame for Short term goals: Until discharge  Short term goal 1: Pt will complete BUE strengthening exercises with min vcs for technique to increase indep and safety with all self cares and transfers. Short term goal 2: Pt will complete standing tolerance x 4 minutes with S and 2 UE release to increase indep and safety with all grooming. Short term goal 3: Pt will complete functional mobility to/from BR and HH distances with S and 0 vcs for safety to increase indep and safety with all self cares and transfers. Short term goal 4: Pt will complete LB dressing with LHAE and min A and min vcs for safety to increase indep within home environment.     Following session, patient left in safe position with all fall risk precautions in place.

## 2021-04-22 NOTE — PROGRESS NOTES
[address] [address]  INPATIENT PHYSICAL THERAPY  EVALUATION  [facility] ORTHOPEDICS 7K - 7K-12/012-A    Time In: 4900  Time Out: 1047  Timed Code Treatment Minutes: 10 Minutes  Minutes: 19          Date: 2021  Patient Name: Danita Oneil,  Gender:  female        MRN: [id number]  : 1950  (66 y.o.)      Referring Practitioner: Verner Stamp, APRN - CNP  Diagnosis: Closed displaced oblique fracture of shaft of right femur  Additional Pertinent Hx: Per H&P \"The patient is a 66 y.o. female with a past medical history significant for HLD, HTN, and OA who ortho admitted for definitive treatment of a right subtroch femur fracture sustained in a fall. Patient states was gardening yesterday and tripped over a root falling onto the right hip. She heard a pop and felt severe pain. She denies other injuries and no LOC. She was not able to get off the ground and ambulate after the fall. She denies numbness and tingling. 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 Xray confirms the fracture. \"     Restrictions/Precautions:  Restrictions/Precautions: General Precautions, Fall Risk    Subjective:  Chart Reviewed: Yes  Patient assessed for rehabilitation services?: Yes  Family / Caregiver Present: No  Subjective: RN approved session. Pt pleasant and agreeable to therapy.      General:  Overall Orientation Status: Within Functional Limits    Vision: Impaired  Vision Exceptions: Wears glasses at all times    Hearing: Within functional limits         Pain: 3-4/10: R hip     Vitals: Vitals not assessed per clinical judgement, see nursing flowsheet    Social/Functional History:    Lives With: Spouse  Type of Home: House  Home Layout: One level  Home Access: Stairs to enter without rails  Entrance Stairs - Number of Steps: 2  Home Equipment: and facilitation of tasks to increase safety and independence with functional mobility for improved independence and quality of life. Assessment: Body structures, Functions, Activity limitations: Decreased functional mobility , Decreased endurance, Decreased strength, Decreased balance  Assessment: Pt demonstrates a decrease in baseline by way of bed mobility, transfers and ambulation secondary to decreased activity tolerance, strength, fatigue, and balance deficits. Pt will benefit from skilled PT services throughout admission and beyond hospital discharge for improvements in functional mobility. Prognosis: Good    REQUIRES PT FOLLOW UP: Yes    Discharge Recommendations:  Discharge Recommendations: Home with Home health PT    Patient Education:  PT Education: Goals, PT Role, Plan of Care, Transfer Training, Gait Training    Equipment Recommendations:  Equipment Needed: No    Plan:  Times per week: 6x O  Times per day: Daily  Current Treatment Recommendations: Strengthening, Gait Training, Patient/Caregiver Education & Training, Stair training, Equipment Evaluation, Education, & procurement, Balance Training, Functional Mobility Training, Endurance Training, Home Exercise Program, Transfer Training, Safety Education & Training    Goals:  Patient goals : to walk again  Short term goals  Time Frame for Short term goals: by discharge  Short term goal 1: sit <> supine with HOB flat, no rails and mod I for increased functional ind  Short term goal 2: sit <> stand with RW and mod I for safe transfers  Short term goal 3: ambulate 100' with RW and mod I for safe household ambulation  Short term goal 4: ascend/descend 2-3 steps with RW and supervision assist for safe enter/exit of home  Long term goals  Time Frame for Long term goals : NA due to short ELOS    Following session, patient left in safe position with all fall risk precautions in place.     Kasandra Sanchez PT, DPT [id number]

## 2021-04-22 NOTE — FLOWSHEET NOTE
Prayer and encouragement      04/22/21 1338   Encounter Summary   Services provided to: Patient   Referral/Consult From: [facility] Family members   Continue Visiting Yes  (4/22)   Complexity of Encounter Low   Length of Encounter 15 minutes   Routine   Type Follow up   Assessment Calm; Approachable   Intervention [place];Prayer;Nurtured hope   Outcome Acceptance;Expressed gratitude;Encouraged; Hopeful;Receptive

## 2021-04-22 NOTE — CARE COORDINATION
4/22/21, 11:45 AM EDT    DISCHARGE ON GOING [address] day: 2  Location: -12/012-A Reason for admit: Closed displaced oblique fracture of shaft of right femur, initial encounter [facility]) [S72.331A]   Procedure: 4/21/21 surgery by Dr Rebecca Smith: Open treatment right subtrochanteric femur fracture with a cephalomedullary nail   Barriers to Discharge: PT/OT. FWB. N/V checks. Monitor labs and VS. Hgb 10.0  PCP: Franklin Arellano MD  Readmission Risk Score: 8%  Patient Goals/Plan/Treatment Preferences: I spoke with Xenia Khan. She said that she will be returning home with her  at discharge. She has all needed equipment for home use. She wants [facility] services at discharge - has had them in the past. SW consulted. HH ordered. Planning discharge home tomorrow.

## 2021-04-23 VITALS
DIASTOLIC BLOOD PRESSURE: 57 MMHG | TEMPERATURE: 97.9 F | RESPIRATION RATE: 18 BRPM | WEIGHT: 152 LBS | BODY MASS INDEX: 26.93 KG/M2 | OXYGEN SATURATION: 93 % | HEIGHT: 63 IN | SYSTOLIC BLOOD PRESSURE: 144 MMHG | HEART RATE: 67 BPM

## 2021-04-23 LAB
ANION GAP SERPL CALCULATED.3IONS-SCNC: 5 MEQ/L (ref 8–16)
AVERAGE GLUCOSE: 102 MG/DL (ref 70–126)
BUN BLDV-MCNC: 13 MG/DL (ref 7–22)
CALCIUM SERPL-MCNC: 8.4 MG/DL (ref 8.5–10.5)
CHLORIDE BLD-SCNC: 108 MEQ/L (ref 98–111)
CO2: 28 MEQ/L (ref 23–33)
CREAT SERPL-MCNC: 0.6 MG/DL (ref 0.4–1.2)
GFR SERPL CREATININE-BSD FRML MDRD: > 90 ML/MIN/1.73M2
GLUCOSE BLD-MCNC: 98 MG/DL (ref 70–108)
HBA1C MFR BLD: 5.4 % (ref 4.4–6.4)
HCT VFR BLD CALC: 27.8 % (ref 37–47)
HEMOGLOBIN: 8.6 GM/DL (ref 12–16)
MAGNESIUM: 2 MG/DL (ref 1.6–2.4)
POTASSIUM SERPL-SCNC: 4.8 MEQ/L (ref 3.5–5.2)
SODIUM BLD-SCNC: 141 MEQ/L (ref 135–145)

## 2021-04-23 PROCEDURE — 6370000000 HC RX 637 (ALT 250 FOR IP): Performed by: FAMILY MEDICINE

## 2021-04-23 PROCEDURE — 97116 GAIT TRAINING THERAPY: CPT

## 2021-04-23 PROCEDURE — 80048 BASIC METABOLIC PNL TOTAL CA: CPT

## 2021-04-23 PROCEDURE — 85014 HEMATOCRIT: CPT

## 2021-04-23 PROCEDURE — 83036 HEMOGLOBIN GLYCOSYLATED A1C: CPT

## 2021-04-23 PROCEDURE — 83735 ASSAY OF MAGNESIUM: CPT

## 2021-04-23 PROCEDURE — 97530 THERAPEUTIC ACTIVITIES: CPT

## 2021-04-23 PROCEDURE — 6370000000 HC RX 637 (ALT 250 FOR IP): Performed by: NURSE PRACTITIONER

## 2021-04-23 PROCEDURE — 97535 SELF CARE MNGMENT TRAINING: CPT

## 2021-04-23 PROCEDURE — 36415 COLL VENOUS BLD VENIPUNCTURE: CPT

## 2021-04-23 PROCEDURE — 85018 HEMOGLOBIN: CPT

## 2021-04-23 PROCEDURE — 99232 SBSQ HOSP IP/OBS MODERATE 35: CPT | Performed by: FAMILY MEDICINE

## 2021-04-23 RX ADMIN — ROSUVASTATIN CALCIUM 20 MG: 20 TABLET, FILM COATED ORAL at 09:11

## 2021-04-23 RX ADMIN — LOSARTAN POTASSIUM 50 MG: 50 TABLET, FILM COATED ORAL at 13:28

## 2021-04-23 RX ADMIN — CYCLOBENZAPRINE HYDROCHLORIDE 10 MG: 10 TABLET, FILM COATED ORAL at 13:29

## 2021-04-23 RX ADMIN — HYDROCODONE BITARTRATE AND ACETAMINOPHEN 2 TABLET: 5; 325 TABLET ORAL at 09:12

## 2021-04-23 RX ADMIN — HYDROCODONE BITARTRATE AND ACETAMINOPHEN 2 TABLET: 5; 325 TABLET ORAL at 13:28

## 2021-04-23 RX ADMIN — METOPROLOL SUCCINATE 25 MG: 25 TABLET, FILM COATED, EXTENDED RELEASE ORAL at 09:11

## 2021-04-23 RX ADMIN — DOCUSATE SODIUM 100 MG: 100 CAPSULE, LIQUID FILLED ORAL at 09:11

## 2021-04-23 RX ADMIN — HYDROCODONE BITARTRATE AND ACETAMINOPHEN 2 TABLET: 5; 325 TABLET ORAL at 00:50

## 2021-04-23 RX ADMIN — HYDROCODONE BITARTRATE AND ACETAMINOPHEN 2 TABLET: 5; 325 TABLET ORAL at 05:02

## 2021-04-23 RX ADMIN — CYCLOBENZAPRINE HYDROCHLORIDE 10 MG: 10 TABLET, FILM COATED ORAL at 00:50

## 2021-04-23 RX ADMIN — POLYETHYLENE GLYCOL 3350 17 G: 17 POWDER, FOR SOLUTION ORAL at 09:12

## 2021-04-23 ASSESSMENT — PAIN SCALES - GENERAL
PAINLEVEL_OUTOF10: 7
PAINLEVEL_OUTOF10: 0
PAINLEVEL_OUTOF10: 7
PAINLEVEL_OUTOF10: 0

## 2021-04-23 NOTE — DISCHARGE INSTR - COC
10/29/2019    Pneumococcal Conjugate 13-valent (Uxylgyl35) 07/14/2017    Pneumococcal Polysaccharide (Blymtvmnt29) 09/12/2018, 09/17/2019    Td vaccine (adult) 06/01/2004    Zoster Recombinant (Shingrix) 09/22/2020, 11/25/2020       Active Problems:  Patient Active Problem List   Diagnosis Code    Osteopenia M85.80    Hypertension I10    Hyperlipidemia E78.5    Arthritis of right shoulder region M19.011    Premature supraventricular beat I49.1    Closed displaced oblique fracture of shaft of right femur ([address] S72.331A       Isolation/Infection:   Isolation            No Isolation          Patient Infection Status       Infection Onset Added Last Indicated Last Indicated By Review Planned Expiration Resolved Resolved By    None active    Resolved    COVID-19 Rule Out 04/20/21 04/20/21 04/20/21 COVID-19, Rapid (Ordered)   04/20/21 Rule-Out Test Resulted            Nurse Assessment:  Last Vital Signs: BP (!) 144/57   Pulse 67   Temp 97.9 °F (36.6 °C) (Oral)   Resp 18   Ht 5' 3\" (1.6 m)   Wt 152 lb (68.9 kg)   LMP  (LMP Unknown) Comment: menopausal   SpO2 93%   BMI 26.93 kg/m²     Last documented pain score (0-10 scale): Pain Level: 0  Last Weight:   Wt Readings from Last 1 Encounters:   04/20/21 152 lb (68.9 kg)     Mental Status:  {IP PT MENTAL STATUS:20030:::0}    IV Access:  { ALBERTO IV ACCESS:442907336:::0}    Nursing Mobility/ADLs:  Walking   {CHP DME ADLs:765939759:::0}  Transfer  {CHP DME ADLs:023637394:::0}  Bathing  {CHP DME ADLs:330112708:::0}  Dressing  {CHP DME ADLs:737766979:::0}  Toileting  {P DME ADLs:625175242:::0}  Feeding  {P DME ADLs:961096490:::0}  Med Admin  {P DME ADLs:271625749:::0}  Med Delivery   { ALBERTO MED Delivery:129675778:::0}    Wound Care Documentation and Therapy:        Elimination:  Continence:    Bowel: {YES / UX:33024}  Bladder: {YES / OE:31571}  Urinary Catheter: {Urinary Catheter:801826398:::0}   Colostomy/Ileostomy/Ileal Conduit: {YES / CH:34167}       Date of Last BM: ***    Intake/Output Summary (Last 24 hours) at 2021 1431  Last data filed at 2021 1353  Gross per 24 hour   Intake 1980 ml   Output 900 ml   Net 1080 ml     I/O last 3 completed shifts:   In: 1260 [P.O.:1260]  Out: 18 [Urine:1200]    Safety Concerns:     [address] Safety Concerns:639375393:::0}    Impairments/Disabilities:      [address] Impairments/Disabilities:705821908:::0}    Nutrition Therapy:  Current Nutrition Therapy:   [address] ALBERTO Diet List:854695261:::0}    Routes of Feeding: {P DME Other Feedings:892305545:::0}  Liquids: {Slp liquid thickness:18952}  Daily Fluid Restriction: {CHP DME Yes amt example:065988358:::0}  Last Modified Barium Swallow with Video (Video Swallowing Test): {Done Not Done AMVK:650681339:::5}    Treatments at the Time of Hospital Discharge:   Respiratory Treatments: ***  Oxygen Therapy:  {Therapy; copd oxygen:69968:::0}  Ventilator:    { CC Vent List:282745322:::0}    Rehab Therapies: {THERAPEUTIC INTERVENTION:8316827887}  Weight Bearing Status/Restrictions: { CC Weight Bearin:::0}  Other Medical Equipment (for information only, NOT a DME order):  {EQUIPMENT:647076785}  Other Treatments: ***    Patient's personal belongings (please select all that are sent with patient):  {CHP DME Belongings:167536167:::0}    RN SIGNATURE:  {Esignature:489328674:::0}    CASE MANAGEMENT/SOCIAL WORK SECTION    Inpatient Status Date: ***    Readmission Risk Assessment Score:  Readmission Risk              Risk of Unplanned Readmission:        8           Discharging to Facility/ Agency   Name:   Address:  Phone:  Fax:    Dialysis Facility (if applicable)   Name:  Address:  Dialysis Schedule:  Phone:  Fax:    / signature: {Esignature:174416061:::0}    PHYSICIAN SECTION    Prognosis: {Prognosis:1658921786:::0}    Condition at Discharge: [address] Patient Condition:298556471:::0}    Rehab Potential (if transferring to Rehab): {Prognosis:4551966536:::0}    Recommended Labs or Other Treatments After Discharge: ***    Physician Certification: I certify the above information and transfer of Alis Shabazz  is necessary for the continuing treatment of the diagnosis listed and that she requires {Admit to Appropriate Level of Care:37843:::0} for {GREATER/LESS:330136628} 30 days.      Update Admission H&P: {CHP DME Changes in HandP:319515456:::0}    PHYSICIAN SIGNATURE:  Electronically signed by MELIZA Morales on 4/23/21 at 2:31 PM EDT

## 2021-04-23 NOTE — DISCHARGE SUMMARY
Orthopaedic Discharge Summary     Patient ID:  Alis Shabazz  [id number]  18 y.o.  1950    Admit date: 4/20/2021    Discharge date and time: 4/23/2021    Admitting Physician: Lester Jarvis MD     Discharge Physician: Nicole Bates    Admission Diagnoses: Closed displaced oblique fracture of shaft of right femur, initial encounter [facility]) [address]    Discharge Diagnoses: same    Admission Condition: stable    Discharged Condition: stable    Indication for Admission: This is a 68-year-old female history of a fall. Pain in the right hip area. Was seen in the emergency department diagnosed with a subtrochanteric femur fracture. She was admitted internal medicine evaluated her and felt her to be of acceptable risk for surgical intervention. Felt she would benefit from surgical intervention stabilization of the fracture which would allow weightbearing and fracture reduction maintenance. Patient agreed. Patient underwent cephalomedullary nailing of the right femur on 6/65/7590 without complication. She has done well post-operatively and is ready for discharge to home with home health at this time    Surgical procedure: Open treatment right subtrochanteric femur fracture with a cephalomedullary nail 30416     Consults: Internal Medicine      Disposition: home with home health    Patient Instructions:   Current Discharge Medication List      START taking these medications    Details   HYDROcodone-acetaminophen (NORCO) 5-325 MG per tablet Take 1-2 tablets by mouth every 4-6 hours as needed for Pain for up to 7 days.   Qty: 42 tablet, Refills: 0    Comments: Reduce doses taken as pain becomes manageable  Associated Diagnoses: Closed displaced oblique fracture of shaft of right femur, initial encounter ([facility])      rivaroxaban (XARELTO) 10 MG TABS tablet Take 1 tablet by mouth every 24 hours  Qty: 18 tablet, Refills: 0      cyclobenzaprine (FLEXERIL) 10 MG tablet Take 1 tablet by mouth 3 times daily as needed for Muscle spasms  Qty: 40 tablet, Refills: 0         CONTINUE these medications which have NOT CHANGED    Details   ibuprofen (ADVIL;MOTRIN) 200 MG tablet Take 200 mg by mouth every 6 hours as needed for Pain      metoprolol succinate (TOPROL XL) 25 MG extended release tablet Take 1 tablet by mouth daily  Qty: 90 tablet, Refills: 1      alendronate (FOSAMAX) 70 MG tablet TAKE 1 TABLET EVERY 7 DAYS  Qty: 12 tablet, Refills: 3    Associated Diagnoses: Age-related osteoporosis without current pathological fracture      losartan (COZAAR) 50 MG tablet TAKE 1 TABLET DAILY  Qty: 90 tablet, Refills: 3    Associated Diagnoses: Essential hypertension      ezetimibe (ZETIA) 10 MG tablet TAKE 1 TABLET DAILY (NEED TO SCHEDULE APPOINTMENT)  Qty: 90 tablet, Refills: 3    Associated Diagnoses: Pure hypercholesterolemia      rosuvastatin (CRESTOR) 20 MG tablet TAKE 1 TABLET DAILY  Qty: 90 tablet, Refills: 3    Associated Diagnoses: Pure hypercholesterolemia      Multiple Vitamins-Minerals (CENTRUM MULTI + OMEGA 3 PO) Take by mouth      Calcium Carb-Cholecalciferol (CALTRATE 600+D3 SOFT PO) Take 1 tablet by mouth daily          STOP taking these medications       penicillin v potassium (VEETID) 500 MG tablet Comments:   Reason for Stopping:         aspirin 81 MG EC tablet Comments:   Reason for Stopping:             Activity: activity as tolerated  Diet: regular diet  Wound Care: keep wound clean and dry, reinforce dressing PRN and ice to area for comfort    Follow-up with Robert in 8 weeks.     Signed:  Araceli Rojas PA-C  4/23/2021  2:28 PM

## 2021-04-23 NOTE — PROGRESS NOTES
Orthopaedic Progress Note      SUBJECTIVE:    Chief Complaint   Patient presents with    Fall    Leg Pain   POD 2 s/p Right IMN. Seen up in [employer] ambulating.   hgb 8.6  States feels good. Physical    Vitals:    04/23/21 0438   BP: (!) 102/57   Pulse: 61   Resp: 16   Temp: 97.6 °F (36.4 °C)   SpO2: 90%         OBJECTIVE  RLE minimal swelling, Denies calf pain. ROM stable. Flex and extends toes and ankle. SILT.        Data  CBC:   Lab Results   Component Value Date    WBC 7.5 04/22/2021    RBC 3.10 04/22/2021    HGB 8.6 04/23/2021    HCT 27.8 04/23/2021    .2 04/22/2021    MCH 32.3 04/22/2021    MCHC 31.3 04/22/2021     04/22/2021    MPV 12.0 04/22/2021     BMP:    Lab Results   Component Value Date     04/23/2021    K 4.8 04/23/2021     04/23/2021    CO2 28 04/23/2021    BUN 13 04/23/2021    LABALBU 4.1 02/22/2019    CREATININE 0.6 04/23/2021    CALCIUM 8.4 04/23/2021    LABGLOM >90 04/23/2021    GLUCOSE 98 04/23/2021     Uric Acid:  No components found for: URIC  PT/INR:    Lab Results   Component Value Date    INR 1.02 04/21/2021     PTT:  No results found for: APTT, PTT[APTT  Troponin:  No results found for: TROPONINI  Urine Culture:  No components found for: CURINE    Current Inpatient Medications    Current Facility-Administered Medications: polyethylene glycol (GLYCOLAX) packet 17 g, 17 g, Oral, Daily  ezetimibe (ZETIA) tablet 10 mg, 10 mg, Oral, Nightly  losartan (COZAAR) tablet 50 mg, 50 mg, Oral, Daily  metoprolol succinate (TOPROL XL) extended release tablet 25 mg, 25 mg, Oral, Daily  rosuvastatin (CRESTOR) tablet 20 mg, 20 mg, Oral, Daily  labetalol (NORMODYNE;TRANDATE) injection syringe 5 mg, 5 mg, Intravenous, Q4H PRN  rivaroxaban (XARELTO) tablet 10 mg, 10 mg, Oral, Daily  docusate sodium (COLACE) capsule 100 mg, 100 mg, Oral, Daily  morphine (PF) injection 2 mg, 2 mg, Intravenous, Q2H PRN  HYDROcodone-acetaminophen (NORCO) 5-325 MG per tablet 1 tablet, 1 tablet, Oral, Q4H PRN  HYDROcodone-acetaminophen (NORCO) 5-325 MG per tablet 2 tablet, 2 tablet, Oral, Q4H PRN  cyclobenzaprine (FLEXERIL) tablet 10 mg, 10 mg, Oral, TID PRN    . ASSESSMENT AND PLAN  1. Dry drsg changes as needed           2. WBAT  3. PT/OT to eval and treat   4. Continue current medical management   5.  Plan for discharge to home tomorrow

## 2021-04-23 NOTE — PROGRESS NOTES
[address] [address]  INPATIENT PHYSICAL THERAPY  DAILY NOTE  [facility] ORTHOPEDICS 7K - 7K-12/012-A    Time In: 1276  Time Out: 1048  Timed Code Treatment Minutes: 34 Minutes  Minutes: 34          Date: 2021  Patient Name: Gudelia Norman,  Gender:  female        MRN: [id number]  : 1950  (66 y.o.)     Referring Practitioner: JULIANNA Hilliard CNP  Diagnosis: Closed displaced oblique fracture of shaft of right femur  Additional Pertinent Hx: Per H&P \"The patient is a 66 y.o. female with a past medical history significant for HLD, HTN, and OA who ortho admitted for definitive treatment of a right subtroch femur fracture sustained in a fall. Patient states was gardening yesterday and tripped over a root falling onto the right hip. She heard a pop and felt severe pain. She denies other injuries and no LOC. She was not able to get off the ground and ambulate after the fall. She denies numbness and tingling. 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 Xray confirms the fracture. \"     Prior Level of Function:  Lives With: Spouse  Type of Home: House  Home Layout: One level  Home Access: Stairs to enter without rails  Entrance Stairs - Number of Steps: 2  Home Equipment: Rolling walker, Cane   Bathroom Shower/Tub: Walk-in shower  Bathroom Toilet: Handicap height  Bathroom Accessibility: Accessible    ADL Assistance: [address] [place] [address] Avenue: Independent  Homemaking Responsibilities: Yes  Ambulation Assistance: Independent  Transfer Assistance: Independent  Active :  Yes  Additional Comments: ind with ambulation prior to admission    Restrictions/Precautions:  Restrictions/Precautions: General Precautions, Fall Risk  Position Activity Restriction  Other position/activity restrictions: 21 surgery by  Robert: Open treatment right subtrochanteric femur fracture with a cephalomedullary nail     SUBJECTIVE: RN approved session. Pt in recliner upon arrival and agrees to therapy. Pt pleasant and cooperative for session- very talkative. PAIN: R hip/thigh. \"not too bad right now\"    Vitals: Vitals not assessed per clinical judgement, see nursing flowsheet    OBJECTIVE:  Bed Mobility:  Sit to Supine: Stand By Assistance   Scooting: Stand By Assistance    Transfers:  Sit to Stand: Stand By Assistance, impuslive 2x during session- good technique  Stand to Sit:Stand By Assistance    Ambulation:  Stand By Assistance  Distance: >300ft 120ft  Surface: Level Tile  Device:Rolling Walker  Gait Deviations: Forward Flexed Posture, Slow Acacia, Decreased Step Length Bilaterally, Decreased Weight Shift Right, Lean to Left, Decreased Gait Speed, Decreased Heel Strike Bilaterally and Decreased Terminal Knee Extension    Stairs:  Stand By Assistance  Number of Steps: 4  Height: 6\" step with Bilateral Handrails  Reminders for leading LE, good technique, step to pattern    Exercise:  Patient was guided in 1 set(s) 20 reps of exercise to both lower extremities. Glut sets, Seated marches, Seated heel/toe raises, Long arc quads, Seated isometric hip adduction, Seated abduction/adduction, Scapular retraction and provided HEP. Exercises were completed for increased independence with functional mobility. Functional Outcome Measures: Completed  AM-PAC Inpatient Mobility Raw Score : 18  AM-PAC Inpatient T-Scale Score : 43.63    ASSESSMENT:  Assessment: Patient progressing toward established goals. Activity Tolerance:  Patient tolerance of  treatment: good. Pt tolerated session well.  Pt will benefit from cont PT at this time to imrpove overall function and mobility to return to PLOF     Equipment Recommendations:Equipment Needed: No  Discharge Recommendations:  Home with Home health PT    Plan: Times per week: 3-5x O  Times per day: Daily  Current Treatment Recommendations: Strengthening, Gait Training, Patient/Caregiver Education & Training, Stair training, Equipment Evaluation, Education, & procurement, Balance Training, Functional Mobility Training, Endurance Training, Home Exercise Program, Transfer Training, Safety Education & Training    Patient Education  Patient Education: Plan of Care, Home Exercise Program, [employer] Mobility, Equipment Education, Transfers, Gait, Stairs, Car Transfers, Verbal Exercise Instruction, Home Safety Education    Goals:  Patient goals : to walk again  Short term goals  Time Frame for Short term goals: by discharge  Short term goal 1: sit <> supine with HOB flat, no rails and mod I for increased functional ind  Short term goal 2: sit <> stand with RW and mod I for safe transfers  Short term goal 3: ambulate 100' with RW and mod I for safe household ambulation  Short term goal 4: ascend/descend 2-3 steps with RW and supervision assist for safe enter/exit of home  Long term goals  Time Frame for Long term goals : NA due to short ELOS    Following session, patient left in safe position with all fall risk precautions in place. Plan of care changed this date and approved by supervising PT, Jessica Zapata

## 2021-04-23 NOTE — PLAN OF CARE
Problem: Pain:  Goal: Pain level will decrease  Description: Pain level will decrease  4/23/2021 0138 by Cesario De Oliveira RN  Outcome: Ongoing  Note: Patient reports pain at a 7 on scale. Patient states oral medication helping to achieve pain goal of a 3 on scale. Patient able to tolerate ice to right hip.  4/22/2021 1654 by Stephanie South RN  Outcome: Ongoing  Note: Patient complains of pain in the right leg with a rating of 6-7 on 0-10 scale. PRN pain medication given as ordered along with ice packs. Patient's stated pain goal is 5. Goal: Control of acute pain  Description: Control of acute pain  4/23/2021 0138 by Cesario De Oliveira RN  Outcome: Ongoing  4/22/2021 1654 by Stephanie South RN  Outcome: Ongoing  Note: Patient complains of pain in the right leg with a rating of 6-7 on 0-10 scale. PRN pain medication given as ordered along with ice packs. Patient's stated pain goal is 5. Goal: Control of chronic pain  Description: Control of chronic pain  4/23/2021 0138 by Cesario De Oliveira RN  Outcome: Ongoing  4/22/2021 1654 by Stephanie South RN  Outcome: Ongoing  Note: Patient complains of pain in the right leg with a rating of 6-7 on 0-10 scale. PRN pain medication given as ordered along with ice packs. Patient's stated pain goal is 5. Problem: Falls - Risk of:  Goal: Will remain free from falls  Description: Will remain free from falls  4/23/2021 0138 by Cesario De Oliveira RN  Outcome: Ongoing  Note: Patient using call light appropriately to call for assistance. Patient continues to work with PT and OT. Patient reports understanding of fall prevention when discussed. Bed alarm remains in place. 4/22/2021 1654 by Stephanie South RN  Outcome: Ongoing  Note: Patient has remained free of falls during this shift. Appropriate fall prevention measures in place. Patient is compliant with using call light for assistance when needed.     Goal: Absence of physical injury  Description: Absence of physical injury  4/23/2021 0138 by Giovanny Lee RN  Outcome: Ongoing  Note: Patient remains free from physical injury at this time. 4/22/2021 1654 by Augusto Colvin RN  Outcome: Ongoing  Note: Patient has remained free of physical injury during this shift. Safe environment provided, call light within reach, and hourly rounding completed. Problem: Discharge Planning:  Goal: Discharged to appropriate level of care  Description: Discharged to appropriate level of care  4/23/2021 0138 by Giovanny Lee RN  Outcome: Ongoing  Note: Patient plans to return home at discharge. Case management assisting with discharge planning.  4/22/2021 1654 by Augusto Colvin RN  Outcome: Ongoing  Note: Discharge planning in progress. Patient is planning to return home with  at discharge. Case management assisting with discharge needs. Problem: Neurological  Goal: Maximum potential motor/sensory/cognitive function  4/23/2021 0138 by Giovanny Lee RN  Outcome: Ongoing  Note: Patient is alert and oriented x4, denies any numbness or tingling, and skin is warm and dry. 4/22/2021 1654 by Augusto Colvin RN  Outcome: Ongoing  Note: Patient is alert and oriented x 4. Neuro assessment within normal limits. Problem: Cardiovascular  Goal: No DVT, peripheral vascular complications  5/36/2917 0138 by Giovanny Lee RN  Outcome: Ongoing  Note: Patient without s/s of DVT. Patient able to take prescribed anticoagulants and wear SCDs to help prevent development of DVT. 4/22/2021 1654 by Augusto Colvin RN  Outcome: Ongoing  Note: Patient without S/SX of DVT. Patient has teds and SCD in place to help prevent development of DVT. Problem: Respiratory  Goal: No pulmonary complications  1/95/6015 0138 by Giovanny Lee RN  Outcome: Ongoing  Note: Patient denies any shortness of breath and remains on room air.  4/22/2021 1654 by Augusto Colvin RN  Outcome: Ongoing  Note: Lung sounds clear throughout.  O2

## 2021-04-23 NOTE — CARE COORDINATION
4/23/21, 11:14 AM EDT    Patient goals/plan/ treatment preferences discussed by  and . Patient goals/plan/ treatment preferences reviewed with patient/ family. Patient/ family verbalize understanding of discharge plan and are in agreement with goal/plan/treatment preferences. Understanding was demonstrated using the teach back method. AVS provided by RN at time of discharge, which includes all necessary medical information pertaining to the patients current course of illness, treatment, post-discharge goals of care, and treatment preferences.     Services After Discharge  Services At/After Discharge: Nursing Services, PT, OT, Aide Services([facility])   IMM Letter  IMM Letter given to Patient/Family/Significant other/Guardian/POA/by[de-identified] cm  IMM Letter date given[de-identified] 04/23/21  IMM Letter time given[de-identified] 7047

## 2021-04-23 NOTE — PROGRESS NOTES
teeth, style hair  Bathing: Supervision. seated to complete LB/UB bathing  Upper Extremity Dressing: Supervision. seated in chair without arms  Lower Extremity Dressing: Supervision. doff/codi B socks/slip on shoes. BALANCE:  Sitting Balance:  Supervision. chair without arms, bedside chair  Standing Balance: Supervision. with RW    BED MOBILITY:  Supine to Sit: Supervision increased time to complete    TRANSFERS:  Sit to Stand:  Stand By Assistance. EOB to RW  Stand to Sit: Stand By Assistance. RW to chair without arms/bedside chair    FUNCTIONAL MOBILITY:  Assistive Device: Rolling Walker  Assist Level:  Supervision. Distance: To and from bathroom  Steady pace, no LOB     ASSESSMENT:     Activity Tolerance:  Patient tolerance of  treatment: good. Discharge Recommendations: Home with assist PRN   Equipment Recommendations: Equipment Needed: No  Plan: Times per week: 6x  Current Treatment Recommendations: Strengthening, Balance Training, Functional Mobility Training, Endurance Training, Safety Education & Training, Self-Care / ADL, Patient/Caregiver Education & Training, Home Management Training    Patient Education  Patient Education: Role of OT, Plan of Care, ADL's, IADL's, Equipment Education, Home Safety, Importance of Increasing Activity and Assistive Device Safety    Goals  Short term goals  Time Frame for Short term goals: Until discharge  Short term goal 1: Pt will complete BUE strengthening exercises with min vcs for technique to increase indep and safety with all self cares and transfers. Short term goal 2: Pt will complete standing tolerance x 4 minutes with S and 2 UE release to increase indep and safety with all grooming. Short term goal 3: Pt will complete functional mobility to/from BR and HH distances with S and 0 vcs for safety to increase indep and safety with all self cares and transfers.   Short term goal 4: Pt will complete LB dressing with LHAE and min A and min vcs for safety to increase indep within home environment. Following session, patient left in safe position with all fall risk precautions in place.

## 2021-04-26 NOTE — PROGRESS NOTES
[address] [place] [address] Rd, [address], [place]  Phone:  [phone]  Fax:  [phone]        [address] or Hospital Follow Up      Martin Bellis   YOB: 1950    Chief Complaint:     Chief Complaint   Patient presents with    Follow-Up from Hospital     right femur fracture       has questions regarding the Amoxil the dentist prescribed prior to root canal prior to the fall   questioning the Xarelto --has not started the medication yet   was possibly having GI bleed      Transition of Care:     Date of Office Visit:  4/27/2021  Date of Hospital Admission: 4/20/21  Date of Hospital Discharge: 4/23/21  Readmission Risk Score(high >=14%. Medium >=10%):Readmission Risk Score: 8    Care management risk score Rising risk (score 2-5) and Complex Care (Scores >=6): 0     Non face to face  following discharge, date last encounter closed (first attempt may have been earlier): 4/27/2021 10:44 AM 4/27/2021 10:44 AM    Call initiated 2 business days of discharge: Yes     Inpatient course: Discharge summary reviewed- see chart. Interval history/Current status:  See below      Past Medical History:     Patient Active Problem List   Diagnosis    Osteopenia    Hypertension    Hyperlipidemia    Arthritis of right shoulder region    Premature supraventricular beat    Closed displaced oblique fracture of shaft of right femur ([address]       Allergies:      Allergies   Allergen Reactions    Demerol Hcl [Meperidine] Nausea And Vomiting     vomitting       Medications:     Medications listed as ordered at the time of discharge from hospital   Sherryle Norrie C   Home Medication Instructions ESME:    Printed on:04/27/21 1248   Medication Information                      cyclobenzaprine (FLEXERIL) 10 MG tablet  Take 1 tablet by mouth 3 times daily as needed for Muscle spasms             ezetimibe (ZETIA) 10 MG tablet  TAKE 1 TABLET DAILY (NEED TO SCHEDULE APPOINTMENT)             HYDROcodone-acetaminophen (NORCO) 5-325 MG per tablet  Take 1-2 tablets by mouth every 4-6 hours as needed for Pain for up to 7 days. ibuprofen (ADVIL;MOTRIN) 200 MG tablet  Take 200 mg by mouth every 6 hours as needed for Pain             losartan (COZAAR) 50 MG tablet  TAKE 1 TABLET DAILY             metoprolol succinate (TOPROL XL) 25 MG extended release tablet  Take 1 tablet by mouth daily             Multiple Vitamins-Minerals (CENTRUM MULTI + OMEGA 3 PO)  Take by mouth             rosuvastatin (CRESTOR) 20 MG tablet  TAKE 1 TABLET DAILY                 Medications marked \"taking\" at this time  Outpatient Medications Marked as Taking for the 4/27/21 encounter (Office Visit) with Terri Sevilla MD   Medication Sig Dispense Refill    HYDROcodone-acetaminophen (NORCO) 5-325 MG per tablet Take 1-2 tablets by mouth every 4-6 hours as needed for Pain for up to 7 days. 42 tablet 0    cyclobenzaprine (FLEXERIL) 10 MG tablet Take 1 tablet by mouth 3 times daily as needed for Muscle spasms 40 tablet 0    ibuprofen (ADVIL;MOTRIN) 200 MG tablet Take 200 mg by mouth every 6 hours as needed for Pain      metoprolol succinate (TOPROL XL) 25 MG extended release tablet Take 1 tablet by mouth daily 90 tablet 1    losartan (COZAAR) 50 MG tablet TAKE 1 TABLET DAILY 90 tablet 3    ezetimibe (ZETIA) 10 MG tablet TAKE 1 TABLET DAILY (NEED TO SCHEDULE APPOINTMENT) 90 tablet 3    rosuvastatin (CRESTOR) 20 MG tablet TAKE 1 TABLET DAILY 90 tablet 3    Multiple Vitamins-Minerals (CENTRUM MULTI + OMEGA 3 PO) Take by mouth        Medications patient taking as of now reconciled against medications ordered at time of hospital discharge: Yes      History of Present Illness:     Jourdan Mclain is a 65 yo female who presents today for transition of care. She was hospitalized at [facility] from 4/20/21-4/23/21. Hospital records, labs, and imaging were reviewed and are summarized below.   On 4/20 she was gardening, tripped over a root and fell onto her left shoulder which was just replaced. Thankfully, that is fine. When she fell she  felt severe pain in her right upper femur. She was unable to get up or ambulate on her own. In the ER she was diagnosed with a subtrochanteric femur fracture.  She was admitted iand was felt to be of acceptable risk for surgical intervention so she underwent cephalomedullary nailing of the right femur on 2/07/2949 without complication. She has done well post-operatively and is ambulating with a walker. Home therapy was at her home yesterday for evaluation. During her hospitalization her hemoglobin dropped from 10 to 8.6. This was felt to be from hemodilution from IV fluids from her surgery. She also had some blood in her stools a few days prior to her fall which was attributed to taking PCN on an empty stomach (had for a dental abscess). She denies any further blood in her stools. She follows with Dr. Meliza Smith for chronic anemia and had a normal colonoscopy last year. Review of Systems:     Review of Systems   Constitutional: Negative for chills and fever. Respiratory: Negative for cough and shortness of breath. Cardiovascular: Negative for palpitations. Gastrointestinal: Negative for blood in stool, constipation, diarrhea, nausea and vomiting. Genitourinary: Negative for hematuria. Musculoskeletal: Positive for arthralgias and myalgias. Skin: Positive for color change and wound. Neurological: Positive for weakness. Physical Examination:     Vitals:    04/27/21 1118   BP: 124/80   Site: Left Upper Arm   Position: Sitting   Cuff Size: Large Adult   Pulse: 76   Temp: 97.5 °F (36.4 °C)   SpO2: 97%   Weight: 152 lb (68.9 kg)   Height: 5' 3\" (1.6 m)     Body mass index is 26.93 kg/m².    Wt Readings from Last 3 Encounters:   04/27/21 152 lb (68.9 kg)   04/20/21 152 lb (68.9 kg)   12/28/20 154 lb (69.9 kg)     BP Readings from Last 3 Encounters:   04/27/21 124/80   04/23/21 (!) 144/57   04/21/21 (!) 145/83       Physical Exam  Vitals signs and nursing note reviewed. Constitutional:       General: She is not in acute distress. Appearance: She is well-developed. HENT:      Head: Normocephalic and atraumatic. Nose: Nose normal.   Eyes:      Conjunctiva/sclera: Conjunctivae normal.   Neck:      Musculoskeletal: Normal range of motion and neck supple. Cardiovascular:      Rate and Rhythm: Normal rate and regular rhythm. Heart sounds: Normal heart sounds. Pulmonary:      Effort: Pulmonary effort is normal. No respiratory distress. Breath sounds: Normal breath sounds. No wheezing. Abdominal:      General: Bowel sounds are normal. There is no distension. Palpations: Abdomen is soft. Tenderness: There is no abdominal tenderness. Skin:     General: Skin is warm and dry. Findings: Bruising (left shoulder, right medial thigh, over right thigh wounds) present. No rash. Comments: Right thigh surgical wounds healing well. Neurological:      Mental Status: She is alert and oriented to person, place, and time. Psychiatric:         Behavior: Behavior normal.       Assessment/Plan:     1. Hospital discharge follow-up  [facility] records, labs, and imaging were reviewed and are summarized above. - [place] DISCHARGE MEDS RECONCILED W/ CURRENT OUTPATIENT MED LIST    2. Closed displaced oblique fracture of shaft of right femur with routine healing, subsequent encounter  - We discussed that her fracture was atypical and was likely a result of the fall plus her Fosamax. Will discontinue it at this time. Pain is controlled with current medication. She is staying active and is ambulating well. She has started home PT. 3. Anemia, unspecified type  - When she was in the hospital her hemoglobin dropped from 10 to 8.6. A few days prior to this she had some blood in her stool after taking PCN on an empty stomach.   All GI symptoms have resolved. She was prescribed Xarelto at discharge but has not taken it because of the anemia and possible GI bleeding. She is taking ASA 81 mg daily. Will check H&H today and refer back to Dr. Dav Castillo for further evaluation of her symptoms.   - Hemoglobin and Hematocrit, Blood; Future        Medical Decision Making: high complexity      Electronically signed by Hernán Beltran MD on 4/27/21.

## 2021-04-27 ENCOUNTER — OFFICE VISIT (OUTPATIENT)
Dept: FAMILY MEDICINE CLINIC | Age: 71
End: 2021-04-27
Payer: MEDICARE

## 2021-04-27 ENCOUNTER — TELEPHONE (OUTPATIENT)
Dept: FAMILY MEDICINE CLINIC | Age: 71
End: 2021-04-27

## 2021-04-27 ENCOUNTER — HOSPITAL ENCOUNTER (OUTPATIENT)
Age: 71
Discharge: HOME | End: 2021-04-27
Payer: MEDICARE

## 2021-04-27 VITALS
BODY MASS INDEX: 26.93 KG/M2 | HEART RATE: 76 BPM | OXYGEN SATURATION: 97 % | DIASTOLIC BLOOD PRESSURE: 80 MMHG | HEIGHT: 63 IN | TEMPERATURE: 97.5 F | SYSTOLIC BLOOD PRESSURE: 124 MMHG | WEIGHT: 152 LBS

## 2021-04-27 DIAGNOSIS — S72.331D CLOSED DISPLACED OBLIQUE FRACTURE OF SHAFT OF RIGHT FEMUR WITH ROUTINE HEALING, SUBSEQUENT ENCOUNTER: ICD-10-CM

## 2021-04-27 DIAGNOSIS — D64.9 ANEMIA, UNSPECIFIED TYPE: ICD-10-CM

## 2021-04-27 DIAGNOSIS — Z09 HOSPITAL DISCHARGE FOLLOW-UP: Primary | ICD-10-CM

## 2021-04-27 LAB
HCT VFR BLD CALC: 36.6 % (ref 37–47)
HEMOGLOBIN: 11.1 GM/DL (ref 12–16)

## 2021-04-27 PROCEDURE — 1111F DSCHRG MED/CURRENT MED MERGE: CPT | Performed by: FAMILY MEDICINE

## 2021-04-27 PROCEDURE — 99496 TRANSJ CARE MGMT HIGH F2F 7D: CPT | Performed by: FAMILY MEDICINE

## 2021-04-27 PROCEDURE — 85018 HEMOGLOBIN: CPT

## 2021-04-27 PROCEDURE — 36415 COLL VENOUS BLD VENIPUNCTURE: CPT

## 2021-04-27 PROCEDURE — 85014 HEMATOCRIT: CPT

## 2021-04-27 ASSESSMENT — ENCOUNTER SYMPTOMS
COLOR CHANGE: 1
BLOOD IN STOOL: 0
DIARRHEA: 0
SHORTNESS OF BREATH: 0
NAUSEA: 0
COUGH: 0
VOMITING: 0
CONSTIPATION: 0

## 2021-04-27 NOTE — TELEPHONE ENCOUNTER
[facility] Transitions Initial Follow Up Call    Outreach made within 2 business days of discharge: Yes    Patient: Tamara Raza Patient : 1950   MRN: [id number]  Reason for Admission: There are no discharge diagnoses documented for the most recent discharge. Discharge Date: 21       Spoke with: Tez Carballo    Discharge department/facility: [facility] Interactive Patient Contact:  Was patient able to fill all prescriptions: Yes  Was patient instructed to bring all medications to the follow-up visit: Yes  Is patient taking all medications as directed in the discharge summary?  Yes  Does patient understand their discharge instructions: Yes  Does patient have questions or concerns that need addressed prior to 7-14 day follow up office visit: no    Scheduled appointment with PCP within 7-14 days    Follow Up  Future Appointments   Date Time Provider Robby Arriaga   2021 11:15 AM Amna Knutson MD [facility] [place]   2021  8:15 AM Carlota Gagnon MD [facility] Carlito Lora, [facility]

## 2021-04-28 ENCOUNTER — TELEPHONE (OUTPATIENT)
Dept: FAMILY MEDICINE CLINIC | Age: 71
End: 2021-04-28

## 2021-04-28 NOTE — TELEPHONE ENCOUNTER
Called and spoke with patient  regarding her hemoglobin. Patient agreed. Patient would like to know if she is supposed to continue taking her xarelto or go back to taking aspirin?

## 2021-04-28 NOTE — TELEPHONE ENCOUNTER
Spoke with the patient to call Dr Heather Storm office regarding the restart of the [facility]

## 2021-05-27 DIAGNOSIS — I10 ESSENTIAL HYPERTENSION: ICD-10-CM

## 2021-05-27 DIAGNOSIS — E78.00 PURE HYPERCHOLESTEROLEMIA: ICD-10-CM

## 2021-05-27 RX ORDER — EZETIMIBE 10 MG/1
TABLET ORAL
Qty: 90 TABLET | Refills: 3 | Status: SHIPPED | OUTPATIENT
Start: 2021-05-27 | End: 2022-04-28 | Stop reason: SDUPTHER

## 2021-05-27 RX ORDER — ROSUVASTATIN CALCIUM 20 MG/1
TABLET, COATED ORAL
Qty: 90 TABLET | Refills: 3 | Status: SHIPPED | OUTPATIENT
Start: 2021-05-27 | End: 2022-04-28 | Stop reason: SDUPTHER

## 2021-05-27 RX ORDER — LOSARTAN POTASSIUM 50 MG/1
TABLET ORAL
Qty: 90 TABLET | Refills: 3 | Status: SHIPPED | OUTPATIENT
Start: 2021-05-27 | End: 2022-04-28 | Stop reason: SDUPTHER

## 2021-06-22 ENCOUNTER — HOSPITAL ENCOUNTER (OUTPATIENT)
Dept: PHYSICAL THERAPY | Age: 71
Setting detail: THERAPIES SERIES
Discharge: HOME | End: 2021-06-22
Payer: MEDICARE

## 2021-06-22 PROCEDURE — 97110 THERAPEUTIC EXERCISES: CPT

## 2021-06-22 PROCEDURE — 97161 PT EVAL LOW COMPLEX 20 MIN: CPT

## 2021-06-22 NOTE — PROGRESS NOTES
as she fell down. Patient reports that she had a R femur fracture on 4/20/21. Patient reports that she had a R hip pinning done on 4/21/21. Patient did have home health PT. Patient denies any precautions from the MD and is WBAT. SUBJECTIVE: Patient reports that she has decreased balance when standing or walking. Patient reports continued difficulty with steps and walking. Patient is using a cane. Social/Functional History and Current Status:  Medications and Allergies have been reviewed and are listed on Medical History Questionnaire. Sudhakar Mercedes lives with spouse in a multiple floor home with stairs and a handrail to enter.     Task Previous Current   ADLs  Independent Modified Independent   IADL's Independent Assistance Required   Ambulation Independent Modified Independent   Transfers Independent Modified Independent   Recreation Independent Assistance Required   Community Integration Independent Modified Independent   Driving Active  Active    Work Retired  Retired     Objective:    GENERAL   Pain 0/10 pain current, 8/10 pain R hip   Palpation No tenderness to touch   Sensation B light touch intact LEs   Observation Incisions well healed with no signs of infection   Edema n/a     LOWER EXTREMITY RANGE OF MOTION    Left Right Comments   Hip Flexion 113 102    Hip Extension [facility] WFL    Hip ABDuction 30 30    Hip ADDuction [facility]    Hip Internal Rotation      Hip External Rotation      Hip Range of Motion is [facility]  []      Knee Flexion      Knee Extension      Knee Range of Motion is [facility]  [x]     LOWER EXTREMITY STRENGTH    Left Right Comments   Hip Flexion 5 3-    Hip Extension 5 4-    Hip Abduction 5 3-    Hip Adduction 5 4    Knee Flexion 5 5    Knee Extension 5 5    Ankle DF 5 5    Ankle PF 5 5    LE strength is [facility] []    SPECIAL TESTS (+/-)    Left Right Comments   Charlane Lands - +    Yaritza's - -    Ely - +    Noble Compression - -    90/90 hamstring length - - (12 deg)        GAIT good    GOALS:  Patient Goal: to improve R hip pain    Short Term Goals: 4 weeks  1. Patient will report decrease in pain to 4/10 at most to allow ease of ADLs and household tasks. 2. Patient will improve R hip flexion AROM to 110 degrees to allow normalized gait pattern. 3. Patient will improve R hip strength to 5/5 to allow ease of walking and stair negoation. 4. Patient will perform R SLS for 30 seconds to improve balance needed for stair negotiation. 5. Patient will ambulate with no AD and no gait deviations to allow patient to ambulate in the grocery store with ease. Long Term Goals: 8 weeks  1. Patient will be independent with HEP in order to prevent re-injury and improve functional abilities. 2. Patient will improve HOOS Jr score from 67.516 to 92.340 to allow improved functional mobility and ease of recreational activities. Patient Education:   [x]  HEP/Education Completed: Plan of Care, Goals, benefit of PT, attendance policy, HEP with handout provided.  [employer] Access Code:  []  No new Education completed  []  Reviewed Prior HEP      [x]  Patient verbalized and/or demonstrated understanding of education provided. []  Patient unable to verbalize and/or demonstrate understanding of education provided. Will continue education. []  Barriers to learning:     PLAN:  Treatment Recommendations: Strengthening, Range of Motion, Balance Training, Functional Mobility Training, Transfer Training, Gait Training, Stair Training, Neuromuscular Re-education, Manual Therapy - Soft Tissue Mobilization, Manual Therapy - Joint Manipulation, Pain Management, Home Exercise Program, Patient Education and Modalities    [x]  Plan of care initiated. Plan to see patient 2-3 times per week for 8 weeks to address the treatment planned outlined above.   []  Continue with current plan of care  []  Modify plan of care as follows:    []  Hold pending physician visit  []  Discharge    Time In 1403   Time Out 1446   Timed Code Minutes: 23 min   Total Treatment Time: 43 min     Electronically Signed by: Zeina Drummond PT

## 2021-06-23 NOTE — TELEPHONE ENCOUNTER
Salina Ocampo called requesting a refill on the following medications:  Requested Prescriptions     Pending Prescriptions Disp Refills    rosuvastatin (CRESTOR) 20 MG tablet [Pharmacy Med Name: ROSUVASTATIN TABS 20MG] 90 tablet 3     Sig: TAKE 1 TABLET DAILY    losartan (COZAAR) 50 MG tablet [Pharmacy Med Name: LOSARTAN TABS 50MG] 90 tablet 3     Sig: TAKE 1 TABLET DAILY    ezetimibe (ZETIA) 10 MG tablet [Pharmacy Med Name: EZETIMIBE TABS 10MG] 90 tablet 3     Sig: TAKE 1 TABLET DAILY (NEED TO SCHEDULE APPOINTMENT)       Date of last visit: 4/27/2021  Date of next visit (if applicable):11/1/2021  Date of last refill: 06/02/20  Pharmacy Name: [facility],  Ramo Gutierrez LPN Detail Level: Generalized Quality 130: Documentation Of Current Medications In The Medical Record: Current Medications Documented

## 2021-06-24 ENCOUNTER — HOSPITAL ENCOUNTER (OUTPATIENT)
Dept: PHYSICAL THERAPY | Age: 71
Setting detail: THERAPIES SERIES
Discharge: HOME | End: 2021-06-24
Payer: MEDICARE

## 2021-06-24 PROCEDURE — 97110 THERAPEUTIC EXERCISES: CPT

## 2021-06-24 NOTE — PROGRESS NOTES
Manual Therapy Time/Technique  Notes                     Exercise/Intervention   Notes   Bike seat 4, step on/off 5 minutes  x    R SLR 10 x 5 x    R hip abduction 10 x  x    Clamshell 10 x  x    Heel slide 15  x    Quad set 15 5 x    Step up forward and lateral 2 inch 15  x    Total gym level 8 10  x                           Specific Interventions Next Treatment: R hip strengthening, R hip ROM flexion, gait training- cane and no device, balance- SLS, modalities as needed     Activity/Treatment Tolerance:  [x]  Patient tolerated treatment well  []  Patient limited by fatigue  []  Patient limited by pain   []  Patient limited by medical complications  []  Other:     Assessment: progressed to bike, exercises noted above, standing and total gym today    Body Structures/Functions/Activity Limitations: impaired activity tolerance, impaired balance, impaired ROM, impaired strength, pain and abnormal gait  Prognosis: good    GOALS:  Patient Goal: to improve R hip pain    Short Term Goals: 4 weeks  1. Patient will report decrease in pain to 4/10 at most to allow ease of ADLs and household tasks. 2. Patient will improve R hip flexion AROM to 110 degrees to allow normalized gait pattern. 3. Patient will improve R hip strength to 5/5 to allow ease of walking and stair negoation. 4. Patient will perform R SLS for 30 seconds to improve balance needed for stair negotiation. 5. Patient will ambulate with no AD and no gait deviations to allow patient to ambulate in the grocery store with ease. Long Term Goals: 8 weeks  1. Patient will be independent with HEP in order to prevent re-injury and improve functional abilities. 2. Patient will improve HOOS Jr score from 67.516 to 92.340 to allow improved functional mobility and ease of recreational activities. Patient Education:   [x]  HEP/Education Completed: Plan of Care, Goals, benefit of PT, attendance policy, HEP with handout provided.   [address]

## 2021-06-28 ENCOUNTER — HOSPITAL ENCOUNTER (OUTPATIENT)
Dept: PHYSICAL THERAPY | Age: 71
Setting detail: THERAPIES SERIES
Discharge: HOME | End: 2021-06-28
Payer: MEDICARE

## 2021-06-28 ENCOUNTER — APPOINTMENT (OUTPATIENT)
Dept: PHYSICAL THERAPY | Age: 71
End: 2021-06-28
Payer: MEDICARE

## 2021-06-28 PROCEDURE — 97110 THERAPEUTIC EXERCISES: CPT

## 2021-06-28 NOTE — PROGRESS NOTES
[address] [facility]  PHYSICAL THERAPY  [] EVALUATION  [x] DAILY NOTE (LAND) [] DAILY NOTE (AQUATIC ) [] PROGRESS NOTE [] DISCHARGE NOTE    [] [facility]   [x] [facility]    [] [facility]   [] [facility]    Date: 2021  Patient Name:  Wenceslao Kitchen  : 1950  MRN: [id number]  CSN: [id number]    Referring Practitioner Dr. 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 Patient reports that she had a R femur fracture on 21. Patient reports that she had a R hip pinning done on 21. Patient did have home health PT. Patient denies any precautions from the MD and is WBAT.      SUBJECTIVE: reports pain in R hip today 4/10 with walking, using cane at all times    Objective:      TREATMENT   Precautions: WBAT R LE, L TKA, B shoulder replacements   Pain: 4/10 pain R hip    X in shaded column indicates activity completed today   Modalities Parameters/  Location  Notes Manual Therapy Time/Technique  Notes                     Exercise/Intervention   Notes   Bike seat 4, step on/off 5 minutes  x    R SLR 10 x 5 x    R hip abduction 2 x 10 x  x    Clamshell 10 x  x    Heel slide 20  x    Quad set 2 x 10 5 x    Step up forward and lateral 2 inch 15  x    Total gym level 8 20  x                           Specific Interventions Next Treatment: R hip strengthening, R hip ROM flexion, gait training- cane and no device, balance- SLS, modalities as needed     Activity/Treatment Tolerance:  [x]  Patient tolerated treatment well  []  Patient limited by fatigue  []  Patient limited by pain   []  Patient limited by medical complications  []  Other:     Assessment:increased reps as noted    Body Structures/Functions/Activity Limitations: impaired activity tolerance, impaired balance, impaired ROM, impaired strength, pain and abnormal gait  Prognosis: good    GOALS:  Patient Goal: to improve R hip pain    Short Term Goals: 4 weeks  1. Patient will report decrease in pain to 4/10 at most to allow ease of ADLs and household tasks. 2. Patient will improve R hip flexion AROM to 110 degrees to allow normalized gait pattern. 3. Patient will improve R hip strength to 5/5 to allow ease of walking and stair negoation. 4. Patient will perform R SLS for 30 seconds to improve balance needed for stair negotiation. 5. Patient will ambulate with no AD and no gait deviations to allow patient to ambulate in the grocery store with ease. Long Term Goals: 8 weeks  1. Patient will be independent with HEP in order to prevent re-injury and improve functional abilities. 2. Patient will improve HOOS Jr score from 67.516 to 92.340 to allow improved functional mobility and ease of recreational activities. Patient Education:   [x]  HEP/Education Completed: Plan of Care, Goals, benefit of PT, attendance policy, HEP with handout provided.    [employer] Access Code:  []  No new Education completed  [] Reviewed Prior HEP      [x]  Patient verbalized and/or demonstrated understanding of education provided. []  Patient unable to verbalize and/or demonstrate understanding of education provided. Will continue education. []  Barriers to learning:     PLAN:  Treatment Recommendations: Strengthening, Range of Motion, Balance Training, Functional Mobility Training, Transfer Training, Gait Training, Stair Training, Neuromuscular Re-education, Manual Therapy - Soft Tissue Mobilization, Manual Therapy - Joint Manipulation, Pain Management, Home Exercise Program, Patient Education and Modalities    []  Plan of care initiated. Plan to see patient 2-3 times per week for 8 weeks to address the treatment planned outlined above.   [x]  Continue with current plan of care  []  Modify plan of care as follows:    []  Hold pending physician visit  []  Discharge    Time In 900   Time Out 925   Timed Code Minutes: 25 min   Total Treatment Time: 25 min     Electronically Signed by: Terri Hooks PT

## 2021-07-01 ENCOUNTER — APPOINTMENT (OUTPATIENT)
Dept: PHYSICAL THERAPY | Age: 71
End: 2021-07-01
Payer: MEDICARE

## 2021-07-01 ENCOUNTER — HOSPITAL ENCOUNTER (OUTPATIENT)
Dept: PHYSICAL THERAPY | Age: 71
Setting detail: THERAPIES SERIES
Discharge: HOME | End: 2021-07-01
Payer: MEDICARE

## 2021-07-01 PROCEDURE — 97110 THERAPEUTIC EXERCISES: CPT

## 2021-07-01 NOTE — PROGRESS NOTES
[address] [facility]  PHYSICAL THERAPY  [] EVALUATION  [x] DAILY NOTE (LAND) [] DAILY NOTE (AQUATIC ) [] PROGRESS NOTE [] DISCHARGE NOTE    [] [address] [facility]   [x] [place]     [] [facility]   [] Ivonne Young    Date: 2021  Patient Name:  Ari Green  : 1950  MRN: [id number]  CSN: [id number]    Referring Practitioner Dr. 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]  Bicipital tendinitis, left shoulder [M75.22]    Treatment Diagnosis Pain in R hip, weakness in R hip, decreased R hip ROM, abnormal gait, impaired balance   Date of Evaluation 21    Additional Pertinent History HTN, irregular heart beat, arthritis, B total shoulders, L TKA 20, L total shoulder replacement 21      Functional Outcome Measure Used HOOS    Functional Outcome Score 67.516 (21)       Insurance: Primary: Payor: Oumou Monsivais /  /  / ,   Secondary:    Authorization Information: [employer] covered, modalities covered except ionto, telehealth covered   Visit # 4, 4/10 for progress note   Visits Allowed: Unlimited based on medical necessity   Recertification Date: 2/76/10   Physician Follow-Up:    Physician Orders:    History of Present Illness: Patient reports that she caught her toe and she twisted on her R leg when she felt the hip break as she fell down. Patient reports that she had a R femur fracture on 21. Patient reports that she had a R hip pinning done on 21. Patient did have home health PT. Patient denies any precautions from the MD and is WBAT.      SUBJECTIVE: reports pain today 2/10, compliant with HEP    Objective:      TREATMENT   Precautions: WBAT R LE, L TKA, B shoulder replacements   Pain: 2/10 pain R hip    X in shaded column indicates activity completed today   Modalities Parameters/  Location  Notes                     Manual Therapy Time/Technique  Notes                     Exercise/Intervention   Notes   Bike seat 4, step on/off 5 minutes  x    R SLR 15 x 5 x    R hip abduction 15 x  x    Clamshell 15 x 5 x    Heel slide 20  x    Quad set 15 5 x    Step up forward and lateral 2 inch 15  x    Marching R/L 15  x    L hip abduction 15  x Cues to stand erect to lessen R lateral trunk flexion   L hip extension 15  x Cues to lessen R lateral trunk flexion   Total gym level 8 20  x                           Specific Interventions Next Treatment: R hip strengthening, R hip ROM flexion, gait training- cane and no device, balance- SLS, modalities as needed     Activity/Treatment Tolerance:  [x]  Patient tolerated treatment well  []  Patient limited by fatigue  []  Patient limited by pain   []  Patient limited by medical complications  []  Other:     Assessment:increased reps as noted, added standing    Body Structures/Functions/Activity Limitations: impaired activity tolerance, impaired balance, impaired ROM, impaired strength, pain and abnormal gait  Prognosis: good    GOALS:  Patient Goal: to improve R hip pain    Short Term Goals: 4 weeks  1. Patient will report decrease in pain to 4/10 at most to allow ease of ADLs and household tasks. 2. Patient will improve R hip flexion AROM to 110 degrees to allow normalized gait pattern. 3. Patient will improve R hip strength to 5/5 to allow ease of walking and stair negoation. 4. Patient will perform R SLS for 30 seconds to improve balance needed for stair negotiation. 5. Patient will ambulate with no AD and no gait deviations to allow patient to ambulate in the grocery store with ease. Long Term Goals: 8 weeks  1. Patient will be independent with HEP in order to prevent re-injury and improve functional abilities. 2. Patient will improve HOOS Jr score from 67.516 to 92.340 to allow improved functional mobility and ease of recreational activities.     Patient Education:   [x]  HEP/Education Completed: Plan of Care, Goals, benefit of PT, attendance policy, HEP with handout provided.  [employer] Access Code:  []  No new Education completed  []  Reviewed Prior HEP      [x]  Patient verbalized and/or demonstrated understanding of education provided. []  Patient unable to verbalize and/or demonstrate understanding of education provided. Will continue education. []  Barriers to learning:     PLAN:  Treatment Recommendations: Strengthening, Range of Motion, Balance Training, Functional Mobility Training, Transfer Training, Gait Training, Stair Training, Neuromuscular Re-education, Manual Therapy - Soft Tissue Mobilization, Manual Therapy - Joint Manipulation, Pain Management, Home Exercise Program, Patient Education and Modalities    []  Plan of care initiated. Plan to see patient 2-3 times per week for 8 weeks to address the treatment planned outlined above.   [x]  Continue with current plan of care  []  Modify plan of care as follows:    []  Hold pending physician visit  []  Discharge    Time In 800   Time Out 830   Timed Code Minutes:   30min   Total Treatment Time: 30 min     Electronically Signed by: Paola Yuan PT

## 2021-07-06 ENCOUNTER — HOSPITAL ENCOUNTER (OUTPATIENT)
Dept: PHYSICAL THERAPY | Age: 71
Setting detail: THERAPIES SERIES
Discharge: HOME | End: 2021-07-06
Payer: MEDICARE

## 2021-07-06 ENCOUNTER — APPOINTMENT (OUTPATIENT)
Dept: PHYSICAL THERAPY | Age: 71
End: 2021-07-06
Payer: MEDICARE

## 2021-07-06 PROCEDURE — 97110 THERAPEUTIC EXERCISES: CPT

## 2021-07-06 NOTE — PROGRESS NOTES
[address] [facility]  PHYSICAL THERAPY  [] EVALUATION  [x] DAILY NOTE (LAND) [] DAILY NOTE (AQUATIC ) [] PROGRESS NOTE [] DISCHARGE NOTE    [] [facility]   [x] [address]    [] [facility]   [] [facility] Linear    Date: 2021  Patient Name:  Lindsay Rodriguez  : 1950  MRN: [id number]  CSN: [id number]    Referring Practitioner Dr. 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]  Bicipital tendinitis, left shoulder [M75.22]    Treatment Diagnosis Pain in R hip, weakness in R hip, decreased R hip ROM, abnormal gait, impaired balance   Date of Evaluation 21    Additional Pertinent History HTN, irregular heart beat, arthritis, B total shoulders, L TKA 20, L total shoulder replacement 21      Functional Outcome Measure Used HOOS    Functional Outcome Score 67.516 (21)       Insurance: Primary: Payor: Aleene Councilman /  /  / ,   Secondary:    Authorization Information: [employer] covered, modalities covered except ionto, telehealth covered   Visit # 5, /10 for progress note   Visits Allowed: Unlimited based on medical necessity   Recertification Date:    Physician Follow-Up:    Physician Orders:    History of Present Illness: Patient reports that she caught her toe and she twisted on her R leg when she felt the hip break as she fell down. Patient reports that she had a R femur fracture on 21. Patient reports that she had a R hip pinning done on 21. Patient did have home health PT. Patient denies any precautions from the MD and is WBAT. SUBJECTIVE: Pt reports she had achiness last night affecting her sleep, but took Advil at 3am and fell back asleep. Pt took Advil 20 min prior to session, so hip feeling pretty good right now.     Objective:      TREATMENT   Precautions: WBAT R LE, L TKA, B shoulder replacements   Pain: Denies right hip pain    X in shaded column indicates activity completed today   Modalities Parameters/  Location  Notes                     Manual Therapy Time/Technique  Notes                     Exercise/Intervention   Notes   Bike seat 4, step on/off 5 minutes  x    R SLR 15 x 5 x    R hip abduction 15 x  x    Clamshell 15 x 5 x    Heel slide 20  x    Quad set 15 5 x    Step up forward and lateral 2 inch 15  x    Marching R/L 15  x    B hip abduction 15  x    B hip extension 15  x    Total gym level 8 20  x Cues to prevent hip IR   Side stepping and forward march 3x30ft  x                    Specific Interventions Next Treatment: R hip strengthening, R hip ROM flexion, gait training- cane and no device, balance- SLS, modalities as needed     Activity/Treatment Tolerance:  [x]  Patient tolerated treatment well  []  Patient limited by fatigue  []  Patient limited by pain   []  Patient limited by medical complications  []  Other:     Assessment: Introduced side stepping and forward march, and had pt complete hip abduction and extension bilaterally to improve stability and strength. Pt challenged with new exercises. Pt required cues to keep hip from IR with TG squats and forward march and instructed to focus on that with heel slides and static march. GOALS:  Patient Goal: to improve R hip pain    Short Term Goals: 4 weeks  1. Patient will report decrease in pain to 4/10 at most to allow ease of ADLs and household tasks. 2. Patient will improve R hip flexion AROM to 110 degrees to allow normalized gait pattern. 3. Patient will improve R hip strength to 5/5 to allow ease of walking and stair negoation. 4. Patient will perform R SLS for 30 seconds to improve balance needed for stair negotiation. 5. Patient will ambulate with no AD and no gait deviations to allow patient to ambulate in the grocery store with ease. Long Term Goals: 8 weeks  1.  Patient will be independent with HEP in order to prevent re-injury and improve functional abilities. 2. Patient will improve HOOS Jr score from 67.516 to 92.340 to allow improved functional mobility and ease of recreational activities. Patient Education:   [x]  HEP/Education Completed: focus on avoiding hip IR with march, heel slides; do hip extension and abduction B   [employer] Access Code:  []  No new Education completed  []  Reviewed Prior HEP      [x]  Patient verbalized and/or demonstrated understanding of education provided. []  Patient unable to verbalize and/or demonstrate understanding of education provided. Will continue education. []  Barriers to learning:     PLAN:  Treatment Recommendations: Strengthening, Range of Motion, Balance Training, Functional Mobility Training, Transfer Training, Gait Training, Stair Training, Neuromuscular Re-education, Manual Therapy - Soft Tissue Mobilization, Manual Therapy - Joint Manipulation, Pain Management, Home Exercise Program, Patient Education and Modalities    []  Plan of care initiated. Plan to see patient 2-3 times per week for 8 weeks to address the treatment planned outlined above.   [x]  Continue with current plan of care  []  Modify plan of care as follows:    []  Hold pending physician visit  []  Discharge    Time In 0730   Time Out 0759   Timed Code Minutes:   29 min   Total Treatment Time: 29 min     Electronically Signed by: Kg Fernández PT

## 2021-07-08 ENCOUNTER — APPOINTMENT (OUTPATIENT)
Dept: PHYSICAL THERAPY | Age: 71
End: 2021-07-08
Payer: MEDICARE

## 2021-07-09 ENCOUNTER — HOSPITAL ENCOUNTER (OUTPATIENT)
Dept: PHYSICAL THERAPY | Age: 71
Setting detail: THERAPIES SERIES
Discharge: HOME | End: 2021-07-09
Payer: MEDICARE

## 2021-07-09 PROCEDURE — 97110 THERAPEUTIC EXERCISES: CPT

## 2021-07-09 NOTE — PROGRESS NOTES
[address] [facility]  PHYSICAL THERAPY  [] EVALUATION  [x] DAILY NOTE (LAND) [] DAILY NOTE (AQUATIC ) [] PROGRESS NOTE [] DISCHARGE NOTE    [] [address] [facility]   [x] Leonid [address]    [] [facility]   [] Elina Hand    Date: 2021  Patient Name:  Nohemy Blackburn  : 1950  MRN: [id number]  CSN: [id number]    Referring Practitioner Dr. 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 Patient reports that she had a R femur fracture on 21. Patient reports that she had a R hip pinning done on 21. Patient did have home health PT. Patient denies any precautions from the MD and is WBAT. SUBJECTIVE: Pt reports tiny bit of pain this morning. Overall, pt notes every day is getting better.      Objective:      TREATMENT   Precautions: WBAT R LE, L TKA, B shoulder replacements   Pain: Tiny bit of right thigh pain    X in shaded column indicates activity completed today   Modalities Parameters/  Location  Notes                     Manual Therapy Time/Technique  Notes                     Exercise/Intervention   Notes   Bike seat 4, step on/off 5 minutes  x    R SLR 20x 5 x    R hip abduction 15x  x    Clamshell 20x 5 x    Heel slide 20  x    Quad set 20 5 x    Step up forward and lateral 2 inch 15  x    Marching R/L 15  x    B hip abduction 15  x    B hip extension 15  x    Total gym level 8 20  x Cues to prevent hip IR   Side stepping and forward march 3x30ft  x                    Specific Interventions Next Treatment: R hip strengthening, R hip ROM flexion, gait training- cane and no device, balance- SLS, modalities as needed     Activity/Treatment Tolerance:  [x]  Patient tolerated treatment well  []  Patient limited by fatigue  []  Patient limited by pain   []  Patient limited by medical complications  []  Other:     Assessment: Progressed reps with few mat exercises with challenge noted. Pt continues to require cues to avoid hip IR with most exercises. Pt notes ease with side lying hip abduction today. GOALS:  Patient Goal: to improve R hip pain    Short Term Goals: 4 weeks  1. Patient will report decrease in pain to 4/10 at most to allow ease of ADLs and household tasks. 2. Patient will improve R hip flexion AROM to 110 degrees to allow normalized gait pattern. 3. Patient will improve R hip strength to 5/5 to allow ease of walking and stair negoation. 4. Patient will perform R SLS for 30 seconds to improve balance needed for stair negotiation. 5. Patient will ambulate with no AD and no gait deviations to allow patient to ambulate in the grocery store with ease. Long Term Goals: 8 weeks  1. Patient will be independent with HEP in order to prevent re-injury and improve functional abilities. 2. Patient will improve HOOS Jr score from 67.516 to 92.340 to allow improved functional mobility and ease of recreational activities.     Patient Education:   []  HEP/Education Completed: focus on avoiding hip IR with march, heel slides; do hip extension and abduction B   Medbridge Access Code:  []  No new Education completed  [x]  Reviewed Prior HEP      [x]  Patient verbalized and/or demonstrated understanding of education provided. []  Patient unable to verbalize and/or demonstrate understanding of education provided. Will continue education. []  Barriers to learning:     PLAN:  Treatment Recommendations: Strengthening, Range of Motion, Balance Training, Functional Mobility Training, Transfer Training, Gait Training, Stair Training, Neuromuscular Re-education, Manual Therapy - Soft Tissue Mobilization, Manual Therapy - Joint Manipulation, Pain Management, Home Exercise Program, Patient Education and Modalities    []  Plan of care initiated. Plan to see patient 2-3 times per week for 8 weeks to address the treatment planned outlined above.   [x]  Continue with current plan of care  []  Modify plan of care as follows:    []  Hold pending physician visit  []  Discharge    Time In 0730   Time Out 0800   Timed Code Minutes:   30 min   Total Treatment Time: 30 min     Electronically Signed by: Alejandro Duncan PT

## 2021-07-13 ENCOUNTER — HOSPITAL ENCOUNTER (OUTPATIENT)
Dept: PHYSICAL THERAPY | Age: 71
Setting detail: THERAPIES SERIES
Discharge: HOME | End: 2021-07-13
Payer: MEDICARE

## 2021-07-13 ENCOUNTER — APPOINTMENT (OUTPATIENT)
Dept: PHYSICAL THERAPY | Age: 71
End: 2021-07-13
Payer: MEDICARE

## 2021-07-13 PROCEDURE — 97110 THERAPEUTIC EXERCISES: CPT

## 2021-07-13 NOTE — PROGRESS NOTES
Manual Therapy Time/Technique  Notes                     Exercise/Intervention   Notes   Bike seat 4, step on/off 5 minutes  x    R SLR 1 x 15, 1x x 10 5 x    R hip abduction 15x 5 x    Clamshell 20x 5 x    Heel slide 20  x    Quad set 20 5 x    Prone SLR  10 5     Step up forward and lateral 2 inch 20  x    Marching R/L 15  x    B hip abduction 15  x    B hip extension 15  x    Total gym level 8 25  x Cues to prevent hip IR   Side stepping and forward march   x                    Specific Interventions Next Treatment: R hip strengthening, R hip ROM flexion, gait training- cane and no device, balance- SLS, modalities as needed     Activity/Treatment Tolerance:  [x]  Patient tolerated treatment well  []  Patient limited by fatigue  []  Patient limited by pain   []  Patient limited by medical complications  []  Other:     Assessment: Progressed to prone SLR and increased reps where noted      GOALS:  Patient Goal: to improve R hip pain    Short Term Goals: 4 weeks  1. Patient will report decrease in pain to 4/10 at most to allow ease of ADLs and household tasks. 2. Patient will improve R hip flexion AROM to 110 degrees to allow normalized gait pattern. 3. Patient will improve R hip strength to 5/5 to allow ease of walking and stair negoation. 4. Patient will perform R SLS for 30 seconds to improve balance needed for stair negotiation. 5. Patient will ambulate with no AD and no gait deviations to allow patient to ambulate in the grocery store with ease. Long Term Goals: 8 weeks  1. Patient will be independent with HEP in order to prevent re-injury and improve functional abilities. 2. Patient will improve HOOS Jr score from 67.516 to 92.340 to allow improved functional mobility and ease of recreational activities.     Patient Education:   []  HEP/Education Completed: focus on avoiding hip IR with march, heel slides; do hip extension and abduction B   ClearKarmabridge Access Code:  []  No new Education completed  [x]  Reviewed Prior HEP      [x]  Patient verbalized and/or demonstrated understanding of education provided. []  Patient unable to verbalize and/or demonstrate understanding of education provided. Will continue education. []  Barriers to learning:     PLAN:  Treatment Recommendations: Strengthening, Range of Motion, Balance Training, Functional Mobility Training, Transfer Training, Gait Training, Stair Training, Neuromuscular Re-education, Manual Therapy - Soft Tissue Mobilization, Manual Therapy - Joint Manipulation, Pain Management, Home Exercise Program, Patient Education and Modalities    []  Plan of care initiated. Plan to see patient 2-3 times per week for 8 weeks to address the treatment planned outlined above.   [x]  Continue with current plan of care  []  Modify plan of care as follows:    []  Hold pending physician visit  []  Discharge    Time In 930   Time Out 1000   Timed Code Minutes:   30 min   Total Treatment Time: 30 min     Electronically Signed by: Deniz Estrada PT

## 2021-07-15 ENCOUNTER — APPOINTMENT (OUTPATIENT)
Dept: PHYSICAL THERAPY | Age: 71
End: 2021-07-15
Payer: MEDICARE

## 2021-07-16 ENCOUNTER — HOSPITAL ENCOUNTER (OUTPATIENT)
Dept: PHYSICAL THERAPY | Age: 71
Setting detail: THERAPIES SERIES
Discharge: HOME | End: 2021-07-16
Payer: MEDICARE

## 2021-07-16 PROCEDURE — 97110 THERAPEUTIC EXERCISES: CPT

## 2021-07-16 NOTE — PROGRESS NOTES
[address] [facility]  PHYSICAL THERAPY  [] EVALUATION  [x] DAILY NOTE (LAND) [] DAILY NOTE (AQUATIC ) [] PROGRESS NOTE [] DISCHARGE NOTE    [] [facility]   [x] [place] [address]    [] [facility]   [] [facility]    Date: 2021  Patient Name:  Llait De Jesus  : 1950  MRN: [id number]  CSN: [id number]    Referring Practitioner Dr. 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]  Bicipital tendinitis, left shoulder [M75.22]    Treatment Diagnosis Pain in R hip, weakness in R hip, decreased R hip ROM, abnormal gait, impaired balance   Date of Evaluation 21    Additional Pertinent History HTN, irregular heart beat, arthritis, B total shoulders, L TKA 20, L total shoulder replacement 21      Functional Outcome Measure Used HOOS    Functional Outcome Score 67.516 (21)       Insurance: Primary: Payor: Kerry Ramires /  /  / ,   Secondary:    Authorization Information: Aquatics covered, modalities covered except ionto, telehealth covered   Visit # 8, 10 for progress note   Visits Allowed: Unlimited based on medical necessity   Recertification Date:    Physician Follow-Up:    Physician Orders:    History of Present Illness: Patient reports that she caught her toe and she twisted on her R leg when she felt the hip break as she fell down. Patient reports that she had a R femur fracture on 21. Patient reports that she had a R hip pinning done on 21. Patient did have home health PT. Patient denies any precautions from the MD and is WBAT. SUBJECTIVE: Patient reports hip is feeling good and getting better every day. Pt went upstairs yesterday reciprocally without even thinking about it. Pt still hesitant to go down steps reciprocally. Pt notes mild pain with weather changes.     Objective:  TREATMENT   Precautions: WBAT R LE, L TKA, B shoulder replacements   Pain: Denies     X in shaded column indicates activity completed today   Modalities Parameters/  Location  Notes                     Manual Therapy Time/Technique  Notes                     Exercise/Intervention   Notes   Bike seat 4, step on/off 5 minutes  x    R SLR 2x15 5 x    R hip abduction 15x 3 x    Clamshell 20x 5 x    Heel slide 20  x    Quad set 20 5 x    Prone SLR  10 5     Step up forward and lateral 4 inch RCC 15  x    Marching R/L 15  x    B hip abduction 15  x    B hip extension 15  x    Total gym level 8 25  x Cues to prevent hip IR   Side stepping without band and with peach band Without band 1x 30ft  With band 2x30 ft  x Cues to keep hip neutral   Forward march 2x30 ft  x             Specific Interventions Next Treatment: R hip strengthening, R hip ROM flexion, gait training- cane and no device, balance- SLS, modalities as needed     Activity/Treatment Tolerance:  [x]  Patient tolerated treatment well  []  Patient limited by fatigue  []  Patient limited by pain   []  Patient limited by medical complications  []  Other:     Assessment: Added tband to side stepping for strengthening with increased challenge noted. Periodic cues needs for neutral hip with side stepping, otherwise improved awareness noted. No pain reported at end of session. GOALS:  Patient Goal: to improve R hip pain    Short Term Goals: 4 weeks  1. Patient will report decrease in pain to 4/10 at most to allow ease of ADLs and household tasks. 2. Patient will improve R hip flexion AROM to 110 degrees to allow normalized gait pattern. 3. Patient will improve R hip strength to 5/5 to allow ease of walking and stair negoation. 4. Patient will perform R SLS for 30 seconds to improve balance needed for stair negotiation. 5. Patient will ambulate with no AD and no gait deviations to allow patient to ambulate in the grocery store with ease. Long Term Goals: 8 weeks  1.  Patient will be independent with HEP in order to prevent re-injury and improve functional abilities. 2. Patient will improve HOOS Jr score from 67.516 to 92.340 to allow improved functional mobility and ease of recreational activities. Patient Education:   []  HEP/Education Completed: focus on avoiding hip IR with march, heel slides; do hip extension and abduction B   Medbridge Access Code:  [x]  No new Education completed  []  Reviewed Prior HEP      []  Patient verbalized and/or demonstrated understanding of education provided. []  Patient unable to verbalize and/or demonstrate understanding of education provided. Will continue education. []  Barriers to learning:     PLAN:  Treatment Recommendations: Strengthening, Range of Motion, Balance Training, Functional Mobility Training, Transfer Training, Gait Training, Stair Training, Neuromuscular Re-education, Manual Therapy - Soft Tissue Mobilization, Manual Therapy - Joint Manipulation, Pain Management, Home Exercise Program, Patient Education and Modalities    []  Plan of care initiated. Plan to see patient 2-3 times per week for 8 weeks to address the treatment planned outlined above.   [x]  Continue with current plan of care  []  Modify plan of care as follows:    []  Hold pending physician visit  []  Discharge    Time In 1001   Time Out 1030   Timed Code Minutes:   29 min   Total Treatment Time: 29 min     Electronically Signed by: Cr Thompson, PT

## 2021-07-20 ENCOUNTER — HOSPITAL ENCOUNTER (OUTPATIENT)
Dept: PHYSICAL THERAPY | Age: 71
Setting detail: THERAPIES SERIES
Discharge: HOME | End: 2021-07-20
Payer: MEDICARE

## 2021-07-20 ENCOUNTER — APPOINTMENT (OUTPATIENT)
Dept: PHYSICAL THERAPY | Age: 71
End: 2021-07-20
Payer: MEDICARE

## 2021-07-20 PROCEDURE — 97110 THERAPEUTIC EXERCISES: CPT

## 2021-07-22 ENCOUNTER — HOSPITAL ENCOUNTER (OUTPATIENT)
Dept: PHYSICAL THERAPY | Age: 71
Setting detail: THERAPIES SERIES
Discharge: HOME | End: 2021-07-22
Payer: MEDICARE

## 2021-07-22 PROCEDURE — 97110 THERAPEUTIC EXERCISES: CPT

## 2021-07-22 NOTE — PROGRESS NOTES
[address] [facility]  PHYSICAL THERAPY  [] EVALUATION  [] DAILY NOTE (LAND) [] DAILY NOTE (AQUATIC ) [x] PROGRESS NOTE [] DISCHARGE NOTE    [] [address] [facility]   [x] [place] [address]    [] [facility]   [] [facility]    Date: 2021  Patient Name:  Lisbeth Storey  : 1950  MRN: [id number]  CSN: [id number]    Referring Practitioner Dr. Lily Garcia   Diagnosis Right femoral fx with routine healing   Treatment Diagnosis Pain in R hip, weakness in R hip, decreased R hip ROM, abnormal gait, impaired balance   Date of Evaluation 21    Additional Pertinent History HTN, irregular heart beat, arthritis, B total shoulders, L TKA 20, L total shoulder replacement 21      Functional Outcome Measure Used HOOS Jr   Functional Outcome Score 67.516 (21) , PN total score 3, 80.550      Insurance: Primary: Payor: [employer] /  /  / ,   Secondary:    Authorization Information: Aquatics covered, modalities covered except ionto, telehealth covered   Visit # 10, 10/10 for progress note   Visits Allowed: Unlimited based on medical necessity   Recertification Date: 04   Physician Follow-Up:    Physician Orders:    History of Present Illness: Patient reports that she caught her toe and she twisted on her R leg when she felt the hip break as she fell down. Patient reports that she had a R femur fracture on 21. Patient reports that she had a R hip pinning done on 21. Patient did have home health PT. Patient denies any precautions from the MD and is WBAT. SUBJECTIVE: reports pain decreased 1/10 average R hip, 3/10 high. Is able to test pool for chemicals, bend over to put robot on lawn, work on flower beds except still with difficulty on incline near river.     Objective:  TREATMENT   Precautions: WBAT R LE, L TKA, B shoulder replacements   Pain: Denies     X in shaded column indicates activity completed today Modalities Parameters/  Location  Notes                     Manual Therapy Time/Technique  Notes                     Exercise/Intervention   Notes   Bike seat 3, step on/off 5 minutes  x    R SLR 2x15 5 x    R hip abduction 2 x 10 3 x    Clamshell 20x 5 x    Prone SLR  10 5 x    Step up forward and lateral 4 inch RCC 25  x    Total gym level 8 30  x Cues to prevent hip IR   tband hip extension, abduction peach band around ankles- R then L 10x   Cues to keep hip neutral   Nautilus knee extension 10# R 2 x 10  x    Nautilus hamstring curl R 30#  2 x 10        Specific Interventions Next Treatment: R hip strengthening, R hip ROM flexion, gait training- cane and no device, balance- SLS, modalities as needed     Activity/Treatment Tolerance:  [x]  Patient tolerated treatment well  []  Patient limited by fatigue  []  Patient limited by pain   []  Patient limited by medical complications  []  Other:     Assessment: excellent progress, strength improving at 4/5 but do feel that additional therapy with strengthening would benefit patient. Patient  Is scheduled with PT through 7/23 and then f/u with MD on 7/28. Patient will discuss with MD , call regarding POC to schedule more if needed with new prescription needed or discharge . GOALS:  Patient Goal: to improve R hip pain    Short Term Goals: 4 weeks  1. Patient will report decrease in pain to 4/10 at most to allow ease of ADLs and household tasks. MET PAIN HIGH 7/05 WITH CERTAIN MOVEMENTS BUT 1/10 AVERAGE  2. Patient will improve R hip flexion AROM to 110 degrees to allow normalized gait pattern. MET HIP FLEXION 110.  3. Patient will improve R hip strength to 5/5 to allow ease of walking and stair negoation. MODERATE PROGRESS 4/5 R HIP, KNEE 4+/5  4. Patient will perform R SLS for 30 seconds to improve balance needed for stair negotiation. MET SLS R 30 SECONDS.    5. Patient will ambulate with no AD and no gait deviations to allow patient to ambulate in the grocery store with ease. .  MET    Long Term Goals: 8 weeks  1. Patient will be independent with HEP in order to prevent re-injury and improve functional abilities. ONGOING  2. Patient will improve HOOS Jr score from 67.516 to 92.340 to allow improved functional mobility and ease of recreational activities. ONGOING    REVISED GOALS:  STG'S DEFERRED TO LTG'S  LTG'S :  4 WEEKS  1. Patient will be independent with HEP in order to prevent re-injury and improve functional abilities. 2.increase R hip strength to 4+/5 , knee to 5/5 to allow patient to walk on incline [place] with cane with no LOB and no hip pain  3. Patient will improve HOOS Jr score from 67.516 to 92.340 to allow improved functional mobility and ease of recreational activities. Patient Education:   []  HEP/Education Completed: focus on avoiding hip IR with march, heel slides; do hip extension and abduction B   Medbridge Access Code:  [x]  No new Education completed  []  Reviewed Prior HEP      []  Patient verbalized and/or demonstrated understanding of education provided. []  Patient unable to verbalize and/or demonstrate understanding of education provided. Will continue education. []  Barriers to learning:     PLAN:  Treatment Recommendations: Strengthening, Range of Motion, Balance Training, Functional Mobility Training, Transfer Training, Gait Training, Stair Training, Neuromuscular Re-education, Manual Therapy - Soft Tissue Mobilization, Manual Therapy - Joint Manipulation, Pain Management, Home Exercise Program, Patient Education and Modalities    []  Plan of care initiated. Plan to see patient 2-3 times per week for 8 weeks to address the treatment planned outlined above.   [x]  Continue with current plan of care  []  Modify plan of care as follows:    []  Hold pending physician visit  []  Discharge    Time In 900   Time Out 930   Timed Code Minutes:   30 min   Total Treatment Time: 30 min     Electronically Signed by: Aquiles Benitez John Paul Goodrich

## 2021-07-23 ENCOUNTER — HOSPITAL ENCOUNTER (OUTPATIENT)
Dept: PHYSICAL THERAPY | Age: 71
Setting detail: THERAPIES SERIES
Discharge: HOME | End: 2021-07-23
Payer: MEDICARE

## 2021-07-23 PROCEDURE — 97110 THERAPEUTIC EXERCISES: CPT

## 2021-07-23 NOTE — PROGRESS NOTES
[facility] [facility]  PHYSICAL THERAPY  [] EVALUATION  [x] DAILY NOTE (LAND) [] DAILY NOTE (AQUATIC ) [] PROGRESS NOTE [] DISCHARGE NOTE    [] [facility] [place]   [x] [place] [address]    [] [facility]   [] Xena Michelle    Date: 2021  Patient Name:  Tacho Conroy  : 1950  MRN: [id number]  CSN: [id number]    Referring Practitioner Dr. 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 Patient reports that she had a R femur fracture on 21. Patient reports that she had a R hip pinning done on 21. Patient did have home health PT. Patient denies any precautions from the MD and is WBAT.      SUBJECTIVE: pain above R knee 1/10, no hip pain    Objective:  TREATMENT   Precautions: WBAT R LE, L TKA, B shoulder replacements   Pain: Denies     X in shaded column indicates activity completed today   Modalities Parameters/  Location  Notes                     Manual Therapy Time/Technique  Notes                     Exercise/Intervention   Notes   Bike seat 3, step on/off 5 minutes  x    R SLR 2x15 5 x    R hip abduction 2 x 10 3 x    Clamshell 20x 5 x    Prone SLR  10 5 x    Step up forward and lateral 4 inch RCC 25  x    Total gym level 8 30  x Cues to prevent hip IR   rockerboard forward/back, side/side 20x   Cues to keep hip neutral   Nautilus knee extension 10# R 2 x 15  x    Nautilus hamstring curl R 30#  2 x 15        Specific Interventions Next Treatment: R hip strengthening, R hip ROM flexion, gait training- cane and no device, balance- SLS, modalities as needed     Activity/Treatment Tolerance:  [x]  Patient tolerated treatment well  []  Patient limited by fatigue  []  Patient limited by pain   []  Patient limited by medical complications  []  Other:     Assessment: excellent progress, strength improving at 4/5 but do feel that additional therapy with strengthening would benefit patient. Patient  Is scheduled with PT through 7/23 and then f/u with MD on 7/28. Patient will discuss with MD , call regarding POC to schedule more if needed with new prescription needed or discharge . GOALS:  Patient Goal: to improve R hip pain    Short Term Goals: 4 weeks  1. Patient will report decrease in pain to 4/10 at most to allow ease of ADLs and household tasks. MET PAIN HIGH 0/29 WITH CERTAIN MOVEMENTS BUT 1/10 AVERAGE  2. Patient will improve R hip flexion AROM to 110 degrees to allow normalized gait pattern. MET HIP FLEXION 110.  3. Patient will improve R hip strength to 5/5 to allow ease of walking and stair negoation. MODERATE PROGRESS 4/5 R HIP, KNEE 4+/5  4. Patient will perform R SLS for 30 seconds to improve balance needed for stair negotiation. MET SLS R 30 SECONDS. 5. Patient will ambulate with no AD and no gait deviations to allow patient to ambulate in the grocery store with ease. .  MET    Long Term Goals: 8 weeks  1. Patient will be independent with HEP in order to prevent re-injury and improve functional abilities. ONGOING  2.  Patient will improve HOOS Jr score from 67.516 to 92.340 to allow improved functional mobility and ease of recreational activities. ONGOING    REVISED GOALS:  STG'S DEFERRED TO LTG'S  LTG'S :  4 WEEKS  1. Patient will be independent with HEP in order to prevent re-injury and improve functional abilities. 2.increase R hip strength to 4+/5 , knee to 5/5 to allow patient to walk on incline at [place] with cane with no LOB and no hip pain  3. Patient will improve HOOS Jr score from 67.516 to 92.340 to allow improved functional mobility and ease of recreational activities. Patient Education:   []  HEP/Education Completed: focus on avoiding hip IR with march, heel slides; do hip extension and abduction B   Medbridge Access Code:  [x]  No new Education completed  []  Reviewed Prior HEP      []  Patient verbalized and/or demonstrated understanding of education provided. []  Patient unable to verbalize and/or demonstrate understanding of education provided. Will continue education. []  Barriers to learning:     PLAN:  Treatment Recommendations: Strengthening, Range of Motion, Balance Training, Functional Mobility Training, Transfer Training, Gait Training, Stair Training, Neuromuscular Re-education, Manual Therapy - Soft Tissue Mobilization, Manual Therapy - Joint Manipulation, Pain Management, Home Exercise Program, Patient Education and Modalities    []  Plan of care initiated. Plan to see patient 2-3 times per week for 8 weeks to address the treatment planned outlined above.   [x]  Continue with current plan of care  []  Modify plan of care as follows:    []  Hold pending physician visit  []  Discharge    Time In 900   Time Out 930   Timed Code Minutes:   30 min   Total Treatment Time: 30 min     Electronically Signed by: Trent Vitale PT

## 2021-08-06 ENCOUNTER — HOSPITAL ENCOUNTER (OUTPATIENT)
Dept: PHYSICAL THERAPY | Age: 71
Setting detail: THERAPIES SERIES
Discharge: HOME | End: 2021-08-06
Payer: MEDICARE

## 2021-08-06 PROCEDURE — 97110 THERAPEUTIC EXERCISES: CPT

## 2021-08-06 NOTE — PROGRESS NOTES
[address] [facility]  PHYSICAL THERAPY  [] EVALUATION  [x] DAILY NOTE (LAND) [] DAILY NOTE (AQUATIC ) [] PROGRESS NOTE [] DISCHARGE NOTE    [] [facility] [place]   [x] [place] [address]    [] [facility]   [] Jonathan Bella    Date: 2021  Patient Name:  Jocelin Lepe  : 1950  MRN: [id number]  CSN: [id number]    Referring Practitioner Dr. 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 /  /  / ,   Secondary:    Authorization Information: Aqu[employer] covered, modalities covered except ionto, telehealth covered   Visit # 12, 2/10 for progress note   Visits Allowed: Unlimited based on medical necessity   Recertification Date: 77   Physician Follow-Up:    Physician Orders:    History of Present Illness: Patient reports that she caught her toe and she twisted on her R leg when she felt the hip break as she fell down. Patient reports that she had a R femur fracture on 21. Patient reports that she had a R hip pinning done on 21. Patient did have home health PT. Patient denies any precautions from the MD and is WBAT.      SUBJECTIVE: reports all pain levels decreased, 1/10 R hip/back region but has been doing a lot of laundry    Objective:  TREATMENT   Precautions: WBAT R LE, L TKA, B shoulder replacements   Pain: 1/10 R hip    X in shaded column indicates activity completed today   Modalities Parameters/  Location  Notes                     Manual Therapy Time/Technique  Notes Exercise/Intervention   Notes   Bike seat 3, step on/off 5 minutes  x    R SLR 2x15 5 x    R hip abduction 1 x 15, 1 x 10 3 x    Clamshell 1 x 15, 1 x 10 5 x    Prone SLR  10 5 x    Step up forward and lateral 6 inch RCC 10  x    Total gym level 8 30  x Cues to prevent hip IR   rockerboard forward/back, side/side 25x   Cues to keep hip neutral   Nautilus knee extension 15# R 2 x 10  x    Nautilus hamstring curl R 30#  2 x 15        Specific Interventions Next Treatment: R hip strengthening, R hip ROM flexion, gait training- cane and no device, balance- SLS, modalities as needed     Activity/Treatment Tolerance:  [x]  Patient tolerated treatment well  []  Patient limited by fatigue  []  Patient limited by pain   []  Patient limited by medical complications  []  Other:     Assessment: increased reps with sidelying and prone exercises, increased weight on knee extension and height on step, still with weakness and tredelenburg with gait so advised use of cane    GOALS:  Patient Goal: to improve R hip pain    REVISED GOALS:  STG'S DEFERRED TO LTG'S  LTG'S :  4 WEEKS  1. Patient will be independent with HEP in order to prevent re-injury and improve functional abilities. 2.increase R hip strength to 4+/5 , knee to 5/5 to allow patient to walk on incline at [place] with cane with no LOB and no hip pain  3. Patient will improve HOOS Jr score from 67.516 to 92.340 to allow improved functional mobility and ease of recreational activities. Patient Education:    [x]  HEP/Education Completed: reviewed HEP increased reps  []  No new Education completed  []  Reviewed Prior HEP      []  Patient verbalized and/or demonstrated understanding of education provided. []  Patient unable to verbalize and/or demonstrate understanding of education provided. Will continue education.   []  Barriers to learning:     PLAN:  Treatment Recommendations: Strengthening, Range of Motion, Balance Training, Functional Mobility Training, Transfer Training, Gait Training, Stair Training, Neuromuscular Re-education, Manual Therapy - Soft Tissue Mobilization, Manual Therapy - Joint Manipulation, Pain Management, Home Exercise Program, Patient Education and Modalities    []  Plan of care initiated. Plan to see patient 2-3 times per week for 8 weeks to address the treatment planned outlined above.   [x]  Continue with current plan of care  []  Modify plan of care as follows:    []  Hold pending physician visit  []  Discharge    Time In 1330   Time Out 1410   Timed Code Minutes:   40 min   Total Treatment Time: 40 min     Electronically Signed by: Liyah Moy PT

## 2021-08-10 ENCOUNTER — HOSPITAL ENCOUNTER (OUTPATIENT)
Dept: PHYSICAL THERAPY | Age: 71
Setting detail: THERAPIES SERIES
Discharge: HOME | End: 2021-08-10
Payer: MEDICARE

## 2021-08-10 PROCEDURE — 97110 THERAPEUTIC EXERCISES: CPT

## 2021-08-10 NOTE — PROGRESS NOTES
[address] [facility]  PHYSICAL THERAPY  [] EVALUATION  [x] DAILY NOTE (LAND) [] DAILY NOTE (AQUATIC ) [] PROGRESS NOTE [] DISCHARGE NOTE    [] [facility] [place]   [x] [place] [address]    [] [facility]   [] Hortencia Claytonworth    Date: 8/10/2021  Patient Name:  Jesenia Jordan  : 1950  MRN: [id number]  CSN: [id number]    Referring Practitioner Dr. 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 Patient reports that she had a R femur fracture on 21. Patient reports that she had a R hip pinning done on 21. Patient did have home health PT. Patient denies any precautions from the MD and is WBAT.      SUBJECTIVE: reports compliant with HEP as prescribed, achiness in all joints 1/10    Objective:  TREATMENT   Precautions: WBAT R LE, L TKA, B shoulder replacements   Pain: 1/10 R hip    X in shaded column indicates activity completed today   Modalities Parameters/  Location  Notes                     Manual Therapy Time/Technique  Notes Exercise/Intervention   Notes   Bike seat 3, step on/off 5 minutes  x    R SLR 2x15 5 x    R hip abduction 2 x 15 5 x    Clamshell 2 x 15 5 x    Prone SLR  15 5 x    Step up forward and lateral 6 inch RCC 10  x    Total gym level 8 30  x Cues to prevent hip IR   rockerboard forward/back, side/side 25x   Cues to keep hip neutral   Nautilus knee extension 15# R 2 x 15  x    Nautilus leg press R 30#  1 x 15  x    Nautilus hamstring curl R 30#  2 x 15  x      Specific Interventions Next Treatment: R hip strengthening, R hip ROM flexion, gait training- cane and no device, balance- SLS, modalities as needed     Activity/Treatment Tolerance:  [x]  Patient tolerated treatment well  []  Patient limited by fatigue  []  Patient limited by pain   []  Patient limited by medical complications  []  Other:     Assessment: increased reps on nautilus without increased pain    GOALS:  Patient Goal: to improve R hip pain    REVISED GOALS:  STG'S DEFERRED TO LTG'S  LTG'S :  4 WEEKS  1. Patient will be independent with HEP in order to prevent re-injury and improve functional abilities. 2.increase R hip strength to 4+/5 , knee to 5/5 to allow patient to walk [place] incline at [place] with cane with no LOB and no hip pain  3. Patient will improve HOOS Jr score from 67.516 to 92.340 to allow improved functional mobility and ease of recreational activities. Patient Education:    [x]  HEP/Education Completed: reviewed HEP increased reps  []  No new Education completed  []  Reviewed Prior HEP      []  Patient verbalized and/or demonstrated understanding of education provided. []  Patient unable to verbalize and/or demonstrate understanding of education provided. Will continue education.   []  Barriers to learning:     PLAN:  Treatment Recommendations: Strengthening, Range of Motion, Balance Training, Functional Mobility Training, Transfer Training, Gait Training, Stair Training, Neuromuscular Re-education, Manual Therapy - Soft Tissue Mobilization, Manual Therapy - Joint Manipulation, Pain Management, Home Exercise Program, Patient Education and Modalities    []  Plan of care initiated. Plan to see patient 2-3 times per week for 8 weeks to address the treatment planned outlined above.   [x]  Continue with current plan of care  []  Modify plan of care as follows:    []  Hold pending physician visit  []  Discharge    Time In 830   Time Out 900   Timed Code Minutes:   30 min   Total Treatment Time: 30 min     Electronically Signed by: Arnold Granger PT

## 2021-08-12 ENCOUNTER — HOSPITAL ENCOUNTER (OUTPATIENT)
Dept: PHYSICAL THERAPY | Age: 71
Setting detail: THERAPIES SERIES
Discharge: HOME | End: 2021-08-12
Payer: MEDICARE

## 2021-08-12 PROCEDURE — 97110 THERAPEUTIC EXERCISES: CPT

## 2021-08-12 NOTE — PROGRESS NOTES
[address] [facility]  PHYSICAL THERAPY  [] EVALUATION  [x] DAILY NOTE (LAND) [] DAILY NOTE (AQUATIC ) [] PROGRESS NOTE [] DISCHARGE NOTE    [] [facility] [place]   [x] [place] [address]    [] [facility]   [] Xena Dominguez    Date: 2021  Patient Name:  Lilliana Lerma  : 1950  MRN: [id number]  CSN: [id number]    Referring Practitioner Dr. Amparo Amezquita   Diagnosis Right femoral fx with routine healing   Treatment Diagnosis Pain in R hip, weakness in R hip, decreased R hip ROM, abnormal gait, impaired balance   Date of Evaluation 21    Additional Pertinent History HTN, irregular heart beat, arthritis, B total shoulders, L TKA 20, L total shoulder replacement 21      Functional Outcome Measure Used HOOS Jr   Functional Outcome Score 67.516 (21) , PN total score 3, 80.550      Insurance: Primary: Payor: Chanda Palm /  /  / ,   Secondary:    Authorization Information: Aqu[employer] covered, modalities covered except ionto, telehealth covered   Visit # 13, 3/10 for progress note   Visits Allowed: Unlimited based on medical necessity   Recertification Date: 67   Physician Follow-Up:    Physician Orders:    History of Present Illness: Patient reports that she caught her toe and she twisted on her R leg when she felt the hip break as she fell down. Patient reports that she had a R femur fracture on 21. Patient reports that she had a R hip pinning done on 21. Patient did have home health PT. Patient denies any precautions from the MD and is WBAT.      SUBJECTIVE: compliant with HEP as prescribed    Objective:  TREATMENT   Precautions: WBAT R LE, L TKA, B shoulder replacements   Pain: 1/10 R hip    X in shaded column indicates activity completed today   Modalities Parameters/  Location  Notes                     Manual Therapy Time/Technique  Notes                     Exercise/Intervention   Notes   Bike seat 3, step on/off 5 minutes  x    R SLR 2x15 5 x    R hip abduction 20x 5 x    Clamshell 20x 5 x    Prone SLR  20x 5 x    Step up forward and lateral 6 inch RCC 15  x    Total gym level 8 30  x Cues to prevent hip IR   rockerboard forward/back, side/side 30x   Cues to keep hip neutral   Nautilus knee extension 15# R 2 x 15  x    Nautilus leg press R 30#  2 x 15  x    Nautilus hamstring curl R 30#  2 x 15  x      Specific Interventions Next Treatment: R hip strengthening, R hip ROM flexion, gait training- cane and no device, balance- SLS, modalities as needed     Activity/Treatment Tolerance:  [x]  Patient tolerated treatment well  []  Patient limited by fatigue  []  Patient limited by pain   []  Patient limited by medical complications  []  Other:     Assessment: increased reps on nautilus without increased pain    GOALS:  Patient Goal: to improve R hip pain    REVISED GOALS:  STG'S DEFERRED TO LTG'S  LTG'S :  4 WEEKS  1. Patient will be independent with HEP in order to prevent re-injury and improve functional abilities. 2.increase R hip strength to 4+/5 , knee to 5/5 to allow patient to walk [place] incline at [place] with cane with no LOB and no hip pain  3. Patient will improve HOOS Jr score from 67.516 to 92.340 to allow improved functional mobility and ease of recreational activities. Patient Education:    [x]  HEP/Education Completed: reviewed side stepping with tband progressed from just hip abduction  []  No new Education completed  []  Reviewed Prior HEP      []  Patient verbalized and/or demonstrated understanding of education provided. []  Patient unable to verbalize and/or demonstrate understanding of education provided. Will continue education.   []  Barriers to learning:     PLAN:  Treatment Recommendations: Strengthening, Range of Motion, Balance Training, Functional Mobility Training, Transfer Training, Gait Training, Stair Training, Neuromuscular Re-education, Manual Therapy - Soft Tissue Mobilization, Manual Therapy - Joint Manipulation, Pain Management, Home Exercise Program, Patient Education and Modalities    []  Plan of care initiated. Plan to see patient 2-3 times per week for 8 weeks to address the treatment planned outlined above.   [x]  Continue with current plan of care  []  Modify plan of care as follows:    []  Hold pending physician visit  []  Discharge    Time In 825   Time Out 905   Timed Code Minutes:   40 min   Total Treatment Time: 40 min     Electronically Signed by: Lashanda Carney, PT

## 2021-08-17 ENCOUNTER — HOSPITAL ENCOUNTER (OUTPATIENT)
Dept: PHYSICAL THERAPY | Age: 71
Setting detail: THERAPIES SERIES
Discharge: HOME | End: 2021-08-17
Payer: MEDICARE

## 2021-08-17 PROCEDURE — 97110 THERAPEUTIC EXERCISES: CPT

## 2021-08-17 NOTE — PROGRESS NOTES
[address] [facility]  PHYSICAL THERAPY  [] EVALUATION  [x] DAILY NOTE (LAND) [] DAILY NOTE (AQUATIC ) [] PROGRESS NOTE [] DISCHARGE NOTE    [] OUTPATIENT REHABILITATION CENTER [facility]   [x] [place] [address]    [] [facility]   [] Mar     Date: 2021  Patient Name:  Daphney Booth  : 1950  MRN: [id number]  CSN: [id number]    Referring Practitioner Dr. Clem Romero   Diagnosis Right femoral fx with routine healing   Treatment Diagnosis Pain in R hip, weakness in R hip, decreased R hip ROM, abnormal gait, impaired balance   Date of Evaluation 21    Additional Pertinent History HTN, irregular heart beat, arthritis, B total shoulders, L TKA 20, L total shoulder replacement 21      Functional Outcome Measure Used HOOS Jr   Functional Outcome Score 67.516 (21) , PN total score 3, 80.550      Insurance: Primary: Payor: Kay Moreno /  /  / ,   Secondary:    Authorization Information: Aqu[employer] covered, modalities covered except ionto, telehealth covered   Visit # 14, 4/10 for progress note   Visits Allowed: Unlimited based on medical necessity   Recertification Date: 74   Physician Follow-Up:    Physician Orders:    History of Present Illness: Patient reports that she caught her toe and she twisted on her R leg when she felt the hip break as she fell down. Patient reports that she had a R femur fracture on 21. Patient reports that she had a R hip pinning done on 21. Patient did have home health PT. Patient denies any precautions from the MD and is WBAT.      SUBJECTIVE: reports achiness last night with rainy day today    Objective:  TREATMENT   Precautions: WBAT R LE, L TKA, B shoulder replacements   Pain: 1/10 R hip    X in shaded column indicates activity completed today   Modalities Parameters/  Location  Notes                     Manual Therapy Time/Technique  Notes                     Exercise/Intervention   Notes Bike seat 3, step on/off 5 minutes  x    R SLR 2x15 5 x    R hip abduction 20x 5 x    Clamshell 20x 5 x    Prone SLR  20x 5 x    Step up forward and lateral 6 inch RCC 15  x    Total gym level 8 30  x Cues to prevent hip IR   rockerboard forward/back, side/side 30x   Cues to keep hip neutral   Nautilus knee extension 20# R 2 x 10  x    Nautilus leg press R 40#  2 x 10  x    Nautilus hamstring curl R 30#  2 x 15  x      Specific Interventions Next Treatment: R hip strengthening, R hip ROM flexion, gait training- cane and no device, balance- SLS, modalities as needed     Activity/Treatment Tolerance:  [x]  Patient tolerated treatment well  []  Patient limited by fatigue  []  Patient limited by pain   []  Patient limited by medical complications  []  Other:     Assessment: also did gait training in hallway with mirror with emphasis on WB R LE and marching L to reduce R lateral trunk leaning    GOALS:  Patient Goal: to improve R hip pain    REVISED GOALS:  STG'S DEFERRED TO LTG'S  LTG'S :  4 WEEKS  1. Patient will be independent with HEP in order to prevent re-injury and improve functional abilities. 2.increase R hip strength to 4+/5 , knee to 5/5 to allow patient to walk [place] incline at [place] with cane with no LOB and no hip pain  3. Patient will improve HOOS Jr score from 67.516 to 92.340 to allow improved functional mobility and ease of recreational activities. Patient Education:    [x]  HEP/Education Completed: added standing at mirror L LE marching  []  No new Education completed  []  Reviewed Prior HEP      []  Patient verbalized and/or demonstrated understanding of education provided. []  Patient unable to verbalize and/or demonstrate understanding of education provided. Will continue education.   []  Barriers to learning:     PLAN:  Treatment Recommendations: Strengthening, Range of Motion, Balance Training, Functional Mobility Training, Transfer Training, Gait Training, Stair Training, Neuromuscular Re-education, Manual Therapy - Soft Tissue Mobilization, Manual Therapy - Joint Manipulation, Pain Management, Home Exercise Program, Patient Education and Modalities    []  Plan of care initiated. Plan to see patient 2-3 times per week for 8 weeks to address the treatment planned outlined above.   [x]  Continue with current plan of care  []  Modify plan of care as follows:    []  Hold pending physician visit  []  Discharge    Time In 825   Time Out 905   Timed Code Minutes:   40 min   Total Treatment Time: 40 min     Electronically Signed by: Liyah Moy PT

## 2021-08-19 ENCOUNTER — HOSPITAL ENCOUNTER (OUTPATIENT)
Dept: PHYSICAL THERAPY | Age: 71
Setting detail: THERAPIES SERIES
End: 2021-08-19
Payer: MEDICARE

## 2021-08-19 RX ORDER — METOPROLOL SUCCINATE 25 MG/1
TABLET, EXTENDED RELEASE ORAL
Qty: 90 TABLET | Refills: 1 | Status: SHIPPED | OUTPATIENT
Start: 2021-08-19 | End: 2021-12-27 | Stop reason: SDUPTHER

## 2021-08-24 ENCOUNTER — HOSPITAL ENCOUNTER (OUTPATIENT)
Dept: PHYSICAL THERAPY | Age: 71
Setting detail: THERAPIES SERIES
Discharge: HOME | End: 2021-08-24
Payer: MEDICARE

## 2021-08-24 PROCEDURE — 97150 GROUP THERAPEUTIC PROCEDURES: CPT

## 2021-08-24 PROCEDURE — 97110 THERAPEUTIC EXERCISES: CPT

## 2021-08-24 NOTE — PROGRESS NOTES
[facility]  PHYSICAL THERAPY  [] EVALUATION  [x] DAILY NOTE (LAND) [] DAILY NOTE (AQUATIC ) [] PROGRESS NOTE [] DISCHARGE NOTE    [] OUTPATIENT REHABILITATION CENTER [facility]   [x] [place] [address]    [] [facility]   [] [facility]    Date: 2021  Patient Name:  Allie Jacob  : 1950  MRN: [id number]  CSN: [id number]    Referring Practitioner Dr. Elliot Stevens   Diagnosis Right femoral fx with routine healing   Treatment Diagnosis Pain in R hip, weakness in R hip, decreased R hip ROM, abnormal gait, impaired balance   Date of Evaluation 21    Additional Pertinent History HTN, irregular heart beat, arthritis, B total shoulders, L TKA 20, L total shoulder replacement 21      Functional Outcome Measure Used HOOS Jr   Functional Outcome Score 67.516 (21) , PN total score 3, 80.550      Insurance: Primary: Payor: Georgi Trivedi /  /  / ,   Secondary:    Authorization Information: Aquatics covered, modalities covered except ionto, telehealth covered   Visit # 15, 5/10 for progress note   Visits Allowed: Unlimited based on medical necessity   Recertification Date:    Physician Follow-Up: 2021   Physician Orders:    History of Present Illness: Patient reports that she caught her toe and she twisted on her R leg when she felt the hip break as she fell down. Patient reports that she had a R femur fracture on 21. Patient reports that she had a R hip pinning done on 21. Patient did have home health PT. Patient denies any precautions from the MD and is WBAT.      SUBJECTIVE: no pain today, working on walking with less lean to side    Objective:  TREATMENT   Precautions: WBAT R LE, L TKA, B shoulder replacements   Pain: 1/10 R hip    X in shaded column indicates activity completed today   Modalities Parameters/  Location  Notes                     Manual Therapy Time/Technique  Notes Exercise/Intervention   Notes   Bike seat 3, step on/off 5 minutes  x    R SLR 20x 5 x    R hip abduction 20x 5 x    Clamshell 20x 5 x    Prone SLR  20x 5 x    Step up forward and lateral 6 inch RCC 20  x    Total gym level 8 30  x Cues to prevent hip IR   rockerboard forward/back, side/side 30x   Cues to keep hip neutral   Nautilus knee extension 20# R 3 x 10  x    Nautilus leg press R 40#  2 x 15  x    Nautilus hamstring curl R 30#  2 x 15  x      Specific Interventions Next Treatment: R hip strengthening, R hip ROM flexion, gait training- cane and no device, balance- SLS, modalities as needed     Activity/Treatment Tolerance:  [x]  Patient tolerated treatment well  []  Patient limited by fatigue  []  Patient limited by pain   []  Patient limited by medical complications  []  Other:     Assessment: working on endurance with SLR's with 20 reps consecutive, increased reps on step up and nautilus, progressing well    GOALS:  Patient Goal: to improve R hip pain    REVISED GOALS:  STG'S DEFERRED TO LTG'S  LTG'S :  4 WEEKS  1. Patient will be independent with HEP in order to prevent re-injury and improve functional abilities. 2.increase R hip strength to 4+/5 , knee to 5/5 to allow patient to walk on incline at riverbed with cane with no LOB and no hip pain  3. Patient will improve HOOS Jr score from 67.516 to 92.340 to allow improved functional mobility and ease of recreational activities. Patient Education:    [x]  HEP/Education Completed: reviewed tband and advised to bring in next week  []  No new Education completed  []  Reviewed Prior HEP      []  Patient verbalized and/or demonstrated understanding of education provided. []  Patient unable to verbalize and/or demonstrate understanding of education provided. Will continue education.   []  Barriers to learning:     PLAN:  Treatment Recommendations: Strengthening, Range of Motion, Balance Training, Functional Mobility Training, Transfer Training, Gait

## 2021-08-26 ENCOUNTER — HOSPITAL ENCOUNTER (OUTPATIENT)
Dept: PHYSICAL THERAPY | Age: 71
Setting detail: THERAPIES SERIES
Discharge: HOME | End: 2021-08-26
Payer: MEDICARE

## 2021-08-26 PROCEDURE — 97110 THERAPEUTIC EXERCISES: CPT

## 2021-08-26 PROCEDURE — 97150 GROUP THERAPEUTIC PROCEDURES: CPT

## 2021-08-26 NOTE — PROGRESS NOTES
[address] [facility]  PHYSICAL THERAPY  [] EVALUATION  [x] DAILY NOTE (LAND) [] DAILY NOTE (AQUATIC ) [] PROGRESS NOTE [] DISCHARGE NOTE    [] OUTPATIENT REHABILITATION CENTER - [place]   [x] [place] [address]    [] [facility]   [] [facility]    Date: 2021  Patient Name:  Tacho Conroy  : 1950  MRN: [id number]  CSN: [id number]    Referring Practitioner Dr. 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, L total shoulder replacement 21      Functional Outcome Measure Used HOOS Jr   Functional Outcome Score 67.516 (21) , PN total score 3, 80.550      Insurance: Primary: Payor: Juan Alberto Marquez /  /  / ,   Secondary:    Authorization Information: Aquatics covered, modalities covered except ionto, telehealth covered   Visit # 16, 6/10 for progress note   Visits Allowed: Unlimited based on medical necessity   Recertification Date: 3/72/15   Physician Follow-Up: 2021   Physician Orders:    History of Present Illness: Patient reports that she caught her toe and she twisted on her R leg when she felt the hip break as she fell down. Patient reports that she had a R femur fracture on 21. Patient reports that she had a R hip pinning done on 21. Patient did have home health PT. Patient denies any precautions from the MD and is WBAT.      SUBJECTIVE: no leg pain, shoulder hurt because picking up rocks in yard    Objective:  TREATMENT   Precautions: WBAT R LE, L TKA, B shoulder replacements   Pain: 1/10 R hip    X in shaded column indicates activity completed today   Modalities Parameters/  Location  Notes                     Manual Therapy Time/Technique  Notes Strengthening, Range of Motion, Balance Training, Functional Mobility Training, Transfer Training, Gait Training, Stair Training, Neuromuscular Re-education, Manual Therapy - Soft Tissue Mobilization, Manual Therapy - Joint Manipulation, Pain Management, Home Exercise Program, Patient Education and Modalities    []  Plan of care initiated. Plan to see patient 2-3 times per week for 8 weeks to address the treatment planned outlined above.   [x]  Continue with current plan of care  []  Modify plan of care as follows:    []  Hold pending physician visit  []  Discharge    Time In 0840   Time Out 910   Timed Code Minutes:   30 min   Total Treatment Time: 30 min     Electronically Signed by: Bambi Farrell, PT

## 2021-08-31 ENCOUNTER — HOSPITAL ENCOUNTER (OUTPATIENT)
Dept: PHYSICAL THERAPY | Age: 71
Setting detail: THERAPIES SERIES
Discharge: HOME | End: 2021-08-31
Payer: MEDICARE

## 2021-08-31 PROCEDURE — 97110 THERAPEUTIC EXERCISES: CPT

## 2021-08-31 NOTE — PROGRESS NOTES
[address] [facility]  PHYSICAL THERAPY  [] EVALUATION  [x] DAILY NOTE (LAND) [] DAILY NOTE (AQUATIC ) [] PROGRESS NOTE [] DISCHARGE NOTE    [] OUTPATIENT REHABILITATION CENTER [facility]   [x] [place] [address]    [] [facility]   [] [facility]    Date: 2021  Patient Name:  Tacho Conroy  : 1950  MRN: [id number]  CSN: [id number]    Referring Practitioner Dr. 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, L total shoulder replacement 21      Functional Outcome Measure Used HOOS Jr   Functional Outcome Score 67.516 (21) , PN total score 3, 80.550      Insurance: Primary: Payor: Juan Alberto Marquez /  /  / ,   Secondary:    Authorization Information: Aquatics covered, modalities covered except ionto, telehealth covered   Visit # 17, 7/10 for progress note   Visits Allowed: Unlimited based on medical necessity   Recertification Date:    Physician Follow-Up: 2021   Physician Orders:    History of Present Illness: Patient reports that she caught her toe and she twisted on her R leg when she felt the hip break as she fell down. Patient reports that she had a R femur fracture on 21. Patient reports that she had a R hip pinning done on 21. Patient did have home health PT. Patient denies any precautions from the MD and is WBAT.      SUBJECTIVE: reports compliant with HEP as prescribed, feels that walking in improving    Objective:  TREATMENT   Precautions: WBAT R LE, L TKA, B shoulder replacements   Pain: 1/10 R hip    X in shaded column indicates activity completed today   Modalities Parameters/  Location  Notes                     Manual Therapy Time/Technique  Notes Exercise/Intervention   Notes   Bike seat 3, step on/off 5 minutes  x    R SLR 1# 15x 5 x    R hip abduction 1# 15x 5 x    Clamshell 20x 5 x    Prone SLR 1# 15x 5 x    Hip abduction tband R/L small loop standing at HR orange 10  x    Step up forward and lateral 6 inch RCC 25  x    Total gym level 8 30  x Cues to prevent hip IR   rockerboard forward/back, side/side 30x   Cues to keep hip neutral   Nautilus knee extension 20# R 3 x 10  x    Nautilus leg press R 40#  2 x 15  x    Nautilus hamstring curl R 30#  2 x 15  x      Specific Interventions Next Treatment: R hip strengthening, R hip ROM flexion, gait training- cane and no device, balance- SLS, modalities as needed     Activity/Treatment Tolerance:  [x]  Patient tolerated treatment well  []  Patient limited by fatigue  []  Patient limited by pain   []  Patient limited by medical complications  []  Other:     Assessment: increased reps with SLR's with 1# weight, 2 more visits  scheduled and plan discharge    GOALS:  Patient Goal: to improve R hip pain    REVISED GOALS:  STG'S DEFERRED TO LTG'S  LTG'S :  4 WEEKS  1. Patient will be independent with HEP in order to prevent re-injury and improve functional abilities. 2.increase R hip strength to 4+/5 , knee to 5/5 to allow patient to walk on incline at river[place] with cane with no LOB and no hip pain  3. Patient will improve HOOS Jr score from 67.516 to 92.340 to allow improved functional mobility and ease of recreational activities. Patient Education:    [x]  HEP/Education Completed: updated HEP to 1# ankle weight with SLR's and given orange tband with 10 reps  []  No new Education completed  []  Reviewed Prior HEP      []  Patient verbalized and/or demonstrated understanding of education provided. []  Patient unable to verbalize and/or demonstrate understanding of education provided. Will continue education.   []  Barriers to learning:     PLAN:  Treatment Recommendations: Strengthening, Range of Motion, Balance Training, Functional Mobility Training, Transfer Training, Gait Training, Stair Training, Neuromuscular Re-education, Manual Therapy - Soft Tissue Mobilization, Manual Therapy - Joint Manipulation, Pain Management, Home Exercise Program, Patient Education and Modalities    []  Plan of care initiated. Plan to see patient 2-3 times per week for 8 weeks to address the treatment planned outlined above.   [x]  Continue with current plan of care  []  Modify plan of care as follows:    []  Hold pending physician visit  []  Discharge    Time In 0830   Time Out 910   Timed Code Minutes:   40 min   Total Treatment Time: 40 min     Electronically Signed by: Trent Vitale, PT

## 2021-09-02 ENCOUNTER — HOSPITAL ENCOUNTER (OUTPATIENT)
Dept: PHYSICAL THERAPY | Age: 71
Setting detail: THERAPIES SERIES
Discharge: HOME | End: 2021-09-02
Payer: MEDICARE

## 2021-09-02 PROCEDURE — 97110 THERAPEUTIC EXERCISES: CPT

## 2021-09-02 NOTE — DISCHARGE SUMMARY
shoulder replacements   Pain: 0/10 R hip    X in shaded column indicates activity completed today   Modalities Parameters/  Location  Notes                     Manual Therapy Time/Technique  Notes                     Exercise/Intervention   Notes   Bike seat 3, step on/off 5 minutes  x    R SLR 1# 15x 5     R hip abduction 1# 15x 5     Clamshell 20x 5     Prone SLR 1# 15x 5     Hip abduction tband R/L small loop standing at HR orange 10  x    Step up forward and lateral 6 inch RCC 30  x    Total gym level 8 30  x Cues to prevent hip IR   rockerboard forward/back, side/side 30x   Cues to keep hip neutral   Nautilus knee extension 20# R 3 x 10  x    Nautilus leg press R 40#  2 x 15  x    Nautilus hamstring curl R 30#  2 x 15  x      Specific Interventions Next Treatment: R hip strengthening, R hip ROM flexion, gait training- cane and no device, balance- SLS, modalities as needed     Activity/Treatment Tolerance:  [x]  Patient tolerated treatment well  []  Patient limited by fatigue  []  Patient limited by pain   []  Patient limited by medical complications  []  Other:     Assessment:  Patient has met all LTG's , will discharge to [facility]. GOALS:  Patient Goal: to improve R hip pain    REVISED GOALS:  STG'S DEFERRED TO LTG'S  LTG'S :  4 WEEKS  1. Patient will be independent with HEP in order to prevent re-injury and improve functional abilities. MET WITH HEP     2.increase R hip strength to 4+/5 , knee to 5/5 to allow patient to walk on incline at riverbed with cane with no LOB and no hip pain. MET STRENGTH HIP 4+/5, KNEE 5/5  3. Patient will improve HOOS Jr score from 67.516 to 92.340 to allow improved functional mobility and ease of recreational activities.   .  MET      Patient Education:    [x]  HEP/Education Completed: updated HEP to 1# ankle weight with SLR's and given orange tband with 10 reps  []  No new Education completed  []  Reviewed Prior HEP      []  Patient verbalized and/or demonstrated understanding of education provided. []  Patient unable to verbalize and/or demonstrate understanding of education provided. Will continue education.   []  Barriers to learning:     PLAN:    [x]  Discharge    Time In 0830   Time Out 910   Timed Code Minutes:   40 min   Total Treatment Time: 40 min     Electronically Signed by: Oscar Payan PT

## 2021-09-07 ENCOUNTER — HOSPITAL ENCOUNTER (OUTPATIENT)
Dept: PHYSICAL THERAPY | Age: 71
Setting detail: THERAPIES SERIES
End: 2021-09-07
Payer: MEDICARE

## 2021-10-21 NOTE — TELEPHONE ENCOUNTER
CC:  Thuan Shah   Chief Complaint   Patient presents with   • Office Visit     new patient,  Low back pain   • Consultation     Referring MD: Clint Barbosa MD  PCP: Mariela Carey MD  Medications: medications verified, no change  Refills needed today?  NO  denies known Latex allergy or symptoms of Latex sensitivity.  Patient would like communication of their results via:    Cell Phone:   Telephone Information:   Mobile [phone]     Okay to leave a message containing results? Yes  Tobacco history: verified    Patient reports low back pain into the left leg and bottom of the foot. Denies numbness/tingling.               Hannah.

## 2021-12-27 ENCOUNTER — OFFICE VISIT (OUTPATIENT)
Dept: CARDIOLOGY CLINIC | Age: 71
End: 2021-12-27
Payer: MEDICARE

## 2021-12-27 VITALS
DIASTOLIC BLOOD PRESSURE: 72 MMHG | SYSTOLIC BLOOD PRESSURE: 138 MMHG | WEIGHT: 165.2 LBS | HEIGHT: 63 IN | HEART RATE: 78 BPM | BODY MASS INDEX: 29.27 KG/M2

## 2021-12-27 DIAGNOSIS — E78.01 FAMILIAL HYPERCHOLESTEROLEMIA: Primary | ICD-10-CM

## 2021-12-27 DIAGNOSIS — I10 PRIMARY HYPERTENSION: ICD-10-CM

## 2021-12-27 PROCEDURE — 99213 OFFICE O/P EST LOW 20 MIN: CPT | Performed by: NUCLEAR MEDICINE

## 2021-12-27 RX ORDER — ASPIRIN 81 MG/1
81 TABLET ORAL DAILY
COMMUNITY

## 2021-12-27 RX ORDER — METOPROLOL SUCCINATE 25 MG/1
TABLET, EXTENDED RELEASE ORAL
Qty: 90 TABLET | Refills: 3 | Status: SHIPPED | OUTPATIENT
Start: 2021-12-27 | End: 2022-04-28 | Stop reason: SDUPTHER

## 2021-12-27 NOTE — PROGRESS NOTES
[address]  [address]  [place] [address]  Dept: [phone]  Dept Fax: [phone]  Loc: [phone]    Visit Date: 12/27/2021    Duc Mishra is a 66 y.o. female who presents todayfor:  Chief Complaint   Patient presents with    Check-Up    Hypertension    Hyperlipidemia   had a fracture of the femur  Not related to a fall   Known risk for CAD  No chest pain  No changes in breathing  She is active  Bp is stable  No dizziness  No syncope        HPI:  HPI  Past Medical History:   Diagnosis Date    Hyperlipidemia     Hypertension     Osteopenia       Past Surgical History:   Procedure Laterality Date   Brandan Calvillo; Benign left breast lesion    COLONOSCOPY      Dr Dann Oliver   last 8-2017    FEMUR FRACTURE SURGERY Right 4/21/2021    RIGHT FEMUR METATAN performed by Mariya Avery MD at [address] Right 08/08/2018    Endoveous Ablation     Family History   Problem Relation Age of Onset    Colon Cancer Mother     Heart Failure Father     Heart Disease Sister     Heart Attack Brother     Stomach Cancer Paternal Grandfather      Social History     Tobacco Use    Smoking status: Never Smoker    Smokeless tobacco: Never Used   Substance Use Topics    Alcohol use: Yes     Comment: occasional      Current Outpatient Medications   Medication Sig Dispense Refill    aspirin 81 MG EC tablet Take 81 mg by mouth daily      Calcium-Phosphorus-Vitamin D (CITRACAL +D3 PO) Take by mouth daily      metoprolol succinate (TOPROL XL) 25 MG extended release tablet TAKE 1 TABLET DAILY 90 tablet 1    rosuvastatin (CRESTOR) 20 MG tablet TAKE 1 TABLET DAILY 90 tablet 3    losartan (COZAAR) 50 MG tablet TAKE 1 TABLET DAILY 90 tablet 3    ezetimibe (ZETIA) 10 MG tablet TAKE 1 TABLET DAILY (NEED TO SCHEDULE APPOINTMENT) 90 tablet 3    ibuprofen (ADVIL;MOTRIN) 200 MG tablet Take 200 mg by mouth every 6 hours as needed for Pain      Multiple Vitamins-Minerals (CENTRUM MULTI + OMEGA 3 PO) Take by mouth       No current facility-administered medications for this visit. Allergies   Allergen Reactions    Demerol Hcl [Meperidine] Nausea And Vomiting     vomitting     Health Maintenance   Topic Date Due    Hepatitis C screen  Never done    DTaP/Tdap/Td vaccine (1 - Tdap) 06/02/2004    Annual Wellness Visit (AWV)  Never done    Lipid screen  02/22/2020    Potassium monitoring  04/23/2022    Creatinine monitoring  04/23/2022    Breast cancer screen  08/12/2022    Colon cancer screen colonoscopy  09/04/2025    DEXA (modify frequency per FRAX score)  Completed    Flu vaccine  Completed    Shingles Vaccine  Completed    Pneumococcal 65+ years Vaccine  Completed    COVID-19 Vaccine  Completed    Hepatitis A vaccine  Aged Out    Hepatitis B vaccine  Aged Out    Hib vaccine  Aged Out    Meningococcal (ACWY) vaccine  Aged Out       Subjective:  Review of Systems  General:   No fever, no chills, some fatigue or weight loss  Pulmonary:    some dyspnea, no wheezing  Cardiac:    Denies recent chest pain,   GI:     No nausea or vomiting, no abdominal pain  Neuro:    No dizziness or light headedness,   Musculoskeletal:  No recent active issues  Extremities:   No edema, no obvious claudication       Objective:  Physical Exam  /72   Pulse 78   Ht 5' 3\" (1.6 m)   Wt 165 lb 3.2 oz (74.9 kg)   LMP  (LMP Unknown) Comment: menopausal   BMI 29.26 kg/m²   General:   Well developed, well nourished  Lungs:   Clear to auscultation  Heart:    Normal S1 S2, Slight murmur. no rubs, no gallops  Abdomen:   Soft, non tender, no organomegalies, positive bowel sounds  Extremities:   No edema, no cyanosis, good peripheral pulses  Neurological:   Awake, alert, oriented.  No obvious focal deficits  Musculoskelatal:  No obvious deformities    Assessment:      Diagnosis Orders   1. Familial hypercholesterolemia     2. Primary hypertension     as above  Cardiac seems to be stable for now  No new issues cardiac wise     Plan:  No follow-ups on file. As above  Continue risk factor modification and medical management'  Thank you for allowing me to participate in the care of your patient. Please don't hesitate to contact me regarding any further issues related to the patient care    Orders Placed:  No orders of the defined types were placed in this encounter. Medications Prescribed:  No orders of the defined types were placed in this encounter. Discussed use, benefit, and side effects of prescribed medications. All patient questions answered. Pt voicedunderstanding. Instructed to continue current medications, diet and exercise. Continue risk factor modification and medical management. Patient agreed with treatment plan. Follow up as directed.     Electronically signedby Min Caal MD on 12/27/2021 at 8:19 AM

## 2022-04-18 DIAGNOSIS — I10 ESSENTIAL HYPERTENSION: ICD-10-CM

## 2022-04-18 DIAGNOSIS — E78.00 PURE HYPERCHOLESTEROLEMIA: ICD-10-CM

## 2022-04-18 RX ORDER — ROSUVASTATIN CALCIUM 20 MG/1
TABLET, COATED ORAL
Qty: 90 TABLET | Refills: 3 | OUTPATIENT
Start: 2022-04-18

## 2022-04-18 RX ORDER — LOSARTAN POTASSIUM 50 MG/1
TABLET ORAL
Qty: 90 TABLET | Refills: 3 | OUTPATIENT
Start: 2022-04-18

## 2022-04-18 RX ORDER — EZETIMIBE 10 MG/1
TABLET ORAL
Qty: 90 TABLET | Refills: 3 | OUTPATIENT
Start: 2022-04-18

## 2022-04-26 ASSESSMENT — ENCOUNTER SYMPTOMS: SHORTNESS OF BREATH: 0

## 2022-04-26 NOTE — PROGRESS NOTES
[address]  [address] [place] [address]  Phone:  [phone]          Name: Jocelin Lepe  : 1950    Chief Complaint   Patient presents with    Medicare AWV       HPI:     Jocelin Lepe is a 74 y.o. female who presents today for follow up of hypertension and hyperlipidemia. She fractured her right femur just over a year ago and is doing better. She's walking slowly and trying to walk more upright. She's nervous about being in large crowds and getting knocked into. She can walk down the stairs without holding the railing. She denies any recent falls. She recently went to [facility] with her  and her 79 yo sister. Hypertension  This is a chronic problem. The current episode started more than 1 year ago. The problem is unchanged. Pertinent negatives include no chest pain, headaches, palpitations, peripheral edema or shortness of breath. There are no associated agents to hypertension. Risk factors for coronary artery disease include post-menopausal state. Past treatments include angiotensin blockers and beta blockers. The current treatment provides moderate improvement. Hyperlipidemia  This is a chronic problem. Pertinent negatives include no chest pain, myalgias or shortness of breath. Current antihyperlipidemic treatment includes ezetimibe. The current treatment provides moderate improvement of lipids. There are no compliance problems.       Lab Results   Component Value Date    CHOL 174 2019    TRIG 122 2019    HDL 60 2019    LDLCALC 90 2019     Lab Results   Component Value Date    ALT 20 2019    AST 23 2019        Lab Results   Component Value Date     2021    K 4.8 2021     2021    CO2 28 2021    BUN 13 2021    CREATININE 0.6 2021    GLUCOSE 98 2021    CALCIUM 8.4 2021          Current Outpatient Medications:     aspirin 81 MG EC tablet, Take 81 mg by mouth daily, Disp: , Rfl:     Calcium-Phosphorus-Vitamin D (CITRACAL +D3 PO), Take by mouth daily, Disp: , Rfl:     metoprolol succinate (TOPROL XL) 25 MG extended release tablet, TAKE 1 TABLET DAILY, Disp: 90 tablet, Rfl: 3    rosuvastatin (CRESTOR) 20 MG tablet, TAKE 1 TABLET DAILY, Disp: 90 tablet, Rfl: 3    losartan (COZAAR) 50 MG tablet, TAKE 1 TABLET DAILY, Disp: 90 tablet, Rfl: 3    ezetimibe (ZETIA) 10 MG tablet, TAKE 1 TABLET DAILY (NEED TO SCHEDULE APPOINTMENT), Disp: 90 tablet, Rfl: 3    ibuprofen (ADVIL;MOTRIN) 200 MG tablet, Take 200 mg by mouth every 6 hours as needed for Pain, Disp: , Rfl:     Multiple Vitamins-Minerals (CENTRUM MULTI + OMEGA 3 PO), Take by mouth, Disp: , Rfl:     Allergies   Allergen Reactions    Demerol Hcl [Meperidine] Nausea And Vomiting     vomitting       Subjective:      Review of Systems   Constitutional: Negative for chills and fever. HENT: Negative for congestion and rhinorrhea. Eyes: Negative for redness and visual disturbance. Respiratory: Negative for cough and shortness of breath. Cardiovascular: Negative for chest pain and palpitations. Gastrointestinal: Negative for blood in stool, constipation, diarrhea, nausea and vomiting. Genitourinary: Negative for dysuria and frequency. Musculoskeletal: Negative for arthralgias and myalgias. Neurological: Negative for weakness and headaches. Psychiatric/Behavioral: Negative for decreased concentration and dysphoric mood. Objective:     /82 (Site: Left Upper Arm, Position: Sitting, Cuff Size: Medium Adult)   Pulse 60   Temp 97.5 °F (36.4 °C) (Temporal)   Resp 16   Ht 5' 2.99\" (1.6 m)   Wt 162 lb (73.5 kg)   LMP  (LMP Unknown)   SpO2 99%   BMI 28.70 kg/m²     Physical Exam  Vitals reviewed. Constitutional:       General: She is not in acute distress. Appearance: She is well-developed. HENT:      Head: Normocephalic and atraumatic.       Right Ear: Tympanic membrane, ear canal and external ear normal.      Left Ear: Tympanic membrane, ear canal and external ear normal.      Nose: Nose normal.   Eyes:      Conjunctiva/sclera: Conjunctivae normal.   Cardiovascular:      Rate and Rhythm: Normal rate and regular rhythm. Heart sounds: Normal heart sounds. Pulmonary:      Effort: Pulmonary effort is normal. No respiratory distress. Breath sounds: Normal breath sounds. No wheezing. Abdominal:      General: Bowel sounds are normal. There is no distension. Palpations: Abdomen is soft. Tenderness: There is no abdominal tenderness. Musculoskeletal:      Cervical back: Normal range of motion and neck supple. Skin:     General: Skin is warm and dry. Neurological:      Mental Status: She is alert and oriented to person, place, and time. Psychiatric:         Behavior: Behavior normal.       Assessment/Plan:     Pia Madrid was seen today for medicare awv. Diagnoses and all orders for this visit:    Essential hypertension  -     Blood pressure has been controlled so will continue on current medications. -     Lipid Panel; Future  -     Comprehensive Metabolic Panel; Future  -     CBC with Auto Differential; Future    Pure hypercholesterolemia        -     A healthy diet and routine physical activity encouraged. -     Lipid Panel; Future      Return for Medicare Annual Wellness Visit in 1 year.     Electronically signed by Chayo Monte MD on 4/28/2022 at 10:35 AM

## 2022-04-26 NOTE — PROGRESS NOTES
[address]  [address] [place] [address]  Phone:  [phone]       Medicare Annual Wellness Visit    Lilliana Lerma is here for Medicare AWV    Assessment & Plan   Encounter for Medicare annual wellness exam        -    Routine healthcare maintenance was reviewed as above. Recommendations for Preventive Services Due: see orders and patient instructions/AVS.  Recommended screening schedule for the next 5-10 years is provided to the patient in written form: see Patient Instructions/AVS.            Patient's complete Health Risk Assessment and screening values have been reviewed and are found in Flowsheets. The following problems were reviewed today and where indicated follow up appointments were made and/or referrals ordered. Positive Risk Factor Screenings with Interventions:             General Health and ACP:  General  In general, how would you say your health is?: Very Good  In the past 7 days, have you experienced any of the following: New or Increased Pain, New or Increased Fatigue, Loneliness, Social Isolation, Stress or Anger?: No  Do you get the social and emotional support that you need?: Yes  Do you have a Living Will?: Yes    Advance Directives     Power of  Living Will ACP-Advance Directive ACP-Power of     Not on File Not on File Not on File Not on File      General Health Risk Interventions:  · No intervention needed. Objective   Vitals:    04/28/22 1014   BP: 138/82   Site: Left Upper Arm   Position: Sitting   Cuff Size: Medium Adult   Pulse: 60   Resp: 16   Temp: 97.5 °F (36.4 °C)   TempSrc: Temporal   SpO2: 99%   Weight: 162 lb (73.5 kg)   Height: 5' 2.99\" (1.6 m)      Body mass index is 28.7 kg/m². Allergies   Allergen Reactions    Demerol Hcl [Meperidine] Nausea And Vomiting     vomitting     Prior to Visit Medications    Medication Sig Taking?  Authorizing Provider   aspirin 81 MG EC tablet Take 81 mg by mouth daily Yes Historical Provider, MD   Calcium-Phosphorus-Vitamin D (CITRACAL +D3 PO) Take by mouth daily Yes Historical Provider, MD   metoprolol succinate (TOPROL XL) 25 MG extended release tablet TAKE 1 TABLET DAILY Yes Sloan Apley, MD   rosuvastatin (CRESTOR) 20 MG tablet TAKE 1 TABLET DAILY Yes Cheryle Kaiser, MD   losartan (COZAAR) 50 MG tablet TAKE 1 TABLET DAILY Yes Cheryle Kaiser, MD   ezetimibe (ZETIA) 10 MG tablet TAKE 1 TABLET DAILY (NEED TO SCHEDULE APPOINTMENT) Yes Cheryle Kaiser, MD   ibuprofen (ADVIL;MOTRIN) 200 MG tablet Take 200 mg by mouth every 6 hours as needed for Pain Yes Historical Provider, MD   Multiple Vitamins-Minerals (CENTRUM MULTI + OMEGA 3 PO) Take by mouth Yes Historical Provider, MD Byrd (Including outside providers/suppliers regularly involved in providing care):   Patient Care Team:  Cheryle Kaiser, MD as PCP - General (Family Medicine)  Cheryle Kaiser, MD as PCP - [facility] Empaneled Provider    Reviewed and updated this visit:  Tobacco  Allergies  Meds  Med Hx  Surg Hx  Soc Hx  Fam Hx

## 2022-04-28 ENCOUNTER — HOSPITAL ENCOUNTER (OUTPATIENT)
Dept: MAMMOGRAPHY | Age: 72
Discharge: HOME | End: 2022-04-28
Payer: MEDICARE

## 2022-04-28 ENCOUNTER — OFFICE VISIT (OUTPATIENT)
Dept: FAMILY MEDICINE CLINIC | Age: 72
End: 2022-04-28
Payer: MEDICARE

## 2022-04-28 ENCOUNTER — NURSE ONLY (OUTPATIENT)
Dept: LAB | Age: 72
End: 2022-04-28

## 2022-04-28 VITALS
SYSTOLIC BLOOD PRESSURE: 138 MMHG | HEIGHT: 63 IN | DIASTOLIC BLOOD PRESSURE: 82 MMHG | WEIGHT: 162 LBS | OXYGEN SATURATION: 99 % | TEMPERATURE: 97.5 F | RESPIRATION RATE: 16 BRPM | BODY MASS INDEX: 28.7 KG/M2 | HEART RATE: 60 BPM

## 2022-04-28 DIAGNOSIS — Z00.00 ENCOUNTER FOR MEDICARE ANNUAL WELLNESS EXAM: Primary | ICD-10-CM

## 2022-04-28 DIAGNOSIS — E78.00 PURE HYPERCHOLESTEROLEMIA: ICD-10-CM

## 2022-04-28 DIAGNOSIS — I10 ESSENTIAL HYPERTENSION: ICD-10-CM

## 2022-04-28 DIAGNOSIS — Z12.39 SCREENING BREAST EXAMINATION: ICD-10-CM

## 2022-04-28 LAB
ALBUMIN SERPL-MCNC: 4.4 G/DL (ref 3.5–5.1)
ALP BLD-CCNC: 70 U/L (ref 38–126)
ALT SERPL-CCNC: 18 U/L (ref 11–66)
ANION GAP SERPL CALCULATED.3IONS-SCNC: 12 MEQ/L (ref 8–16)
AST SERPL-CCNC: 22 U/L (ref 5–40)
BASOPHILS # BLD: 0.6 %
BASOPHILS ABSOLUTE: 0 THOU/MM3 (ref 0–0.1)
BILIRUB SERPL-MCNC: 0.4 MG/DL (ref 0.3–1.2)
BUN BLDV-MCNC: 23 MG/DL (ref 7–22)
CALCIUM SERPL-MCNC: 9.7 MG/DL (ref 8.5–10.5)
CHLORIDE BLD-SCNC: 101 MEQ/L (ref 98–111)
CHOLESTEROL, TOTAL: 167 MG/DL (ref 100–199)
CO2: 25 MEQ/L (ref 23–33)
CREAT SERPL-MCNC: 0.7 MG/DL (ref 0.4–1.2)
EOSINOPHIL # BLD: 1.8 %
EOSINOPHILS ABSOLUTE: 0.1 THOU/MM3 (ref 0–0.4)
ERYTHROCYTE [DISTWIDTH] IN BLOOD BY AUTOMATED COUNT: 13.3 % (ref 11.5–14.5)
ERYTHROCYTE [DISTWIDTH] IN BLOOD BY AUTOMATED COUNT: 51.5 FL (ref 35–45)
GFR SERPL CREATININE-BSD FRML MDRD: 82 ML/MIN/1.73M2
GLUCOSE BLD-MCNC: 104 MG/DL (ref 70–108)
HCT VFR BLD CALC: 44.6 % (ref 37–47)
HDLC SERPL-MCNC: 53 MG/DL
HEMOGLOBIN: 13.8 GM/DL (ref 12–16)
IMMATURE GRANS (ABS): 0 THOU/MM3 (ref 0–0.07)
IMMATURE GRANULOCYTES: 0 %
LDL CHOLESTEROL CALCULATED: 88 MG/DL
LYMPHOCYTES # BLD: 25 %
LYMPHOCYTES ABSOLUTE: 1.3 THOU/MM3 (ref 1–4.8)
MCH RBC QN AUTO: 32.3 PG (ref 26–33)
MCHC RBC AUTO-ENTMCNC: 30.9 GM/DL (ref 32.2–35.5)
MCV RBC AUTO: 104.4 FL (ref 81–99)
MONOCYTES # BLD: 11.9 %
MONOCYTES ABSOLUTE: 0.6 THOU/MM3 (ref 0.4–1.3)
NUCLEATED RED BLOOD CELLS: 0 /100 WBC
PLATELET # BLD: 193 THOU/MM3 (ref 130–400)
PMV BLD AUTO: 12.7 FL (ref 9.4–12.4)
POTASSIUM SERPL-SCNC: 5.2 MEQ/L (ref 3.5–5.2)
RBC # BLD: 4.27 MILL/MM3 (ref 4.2–5.4)
SEG NEUTROPHILS: 60.7 %
SEGMENTED NEUTROPHILS ABSOLUTE COUNT: 3.1 THOU/MM3 (ref 1.8–7.7)
SODIUM BLD-SCNC: 138 MEQ/L (ref 135–145)
TOTAL PROTEIN: 7.1 G/DL (ref 6.1–8)
TRIGL SERPL-MCNC: 130 MG/DL (ref 0–199)
WBC # BLD: 5.1 THOU/MM3 (ref 4.8–10.8)

## 2022-04-28 PROCEDURE — 77063 BREAST TOMOSYNTHESIS BI: CPT

## 2022-04-28 PROCEDURE — G0439 PPPS, SUBSEQ VISIT: HCPCS | Performed by: FAMILY MEDICINE

## 2022-04-28 PROCEDURE — 99213 OFFICE O/P EST LOW 20 MIN: CPT | Performed by: FAMILY MEDICINE

## 2022-04-28 RX ORDER — METOPROLOL SUCCINATE 25 MG/1
TABLET, EXTENDED RELEASE ORAL
Qty: 90 TABLET | Refills: 3 | Status: SHIPPED | OUTPATIENT
Start: 2022-04-28

## 2022-04-28 RX ORDER — LOSARTAN POTASSIUM 50 MG/1
50 TABLET ORAL DAILY
Qty: 90 TABLET | Refills: 3 | Status: SHIPPED | OUTPATIENT
Start: 2022-04-28

## 2022-04-28 RX ORDER — ROSUVASTATIN CALCIUM 20 MG/1
20 TABLET, COATED ORAL DAILY
Qty: 90 TABLET | Refills: 3 | Status: SHIPPED | OUTPATIENT
Start: 2022-04-28

## 2022-04-28 RX ORDER — EZETIMIBE 10 MG/1
10 TABLET ORAL DAILY
Qty: 90 TABLET | Refills: 3 | Status: SHIPPED | OUTPATIENT
Start: 2022-04-28

## 2022-04-28 SDOH — ECONOMIC STABILITY: FOOD INSECURITY: WITHIN THE PAST 12 MONTHS, YOU WORRIED THAT YOUR FOOD WOULD RUN OUT BEFORE YOU GOT MONEY TO BUY MORE.: NEVER TRUE

## 2022-04-28 SDOH — ECONOMIC STABILITY: FOOD INSECURITY: WITHIN THE PAST 12 MONTHS, THE FOOD YOU BOUGHT JUST DIDN'T LAST AND YOU DIDN'T HAVE MONEY TO GET MORE.: NEVER TRUE

## 2022-04-28 ASSESSMENT — ENCOUNTER SYMPTOMS
NAUSEA: 0
RHINORRHEA: 0
CONSTIPATION: 0
BLOOD IN STOOL: 0
VOMITING: 0
COUGH: 0
DIARRHEA: 0
EYE REDNESS: 0

## 2022-04-28 ASSESSMENT — PATIENT HEALTH QUESTIONNAIRE - PHQ9
SUM OF ALL RESPONSES TO PHQ9 QUESTIONS 1 & 2: 0
SUM OF ALL RESPONSES TO PHQ QUESTIONS 1-9: 0
1. LITTLE INTEREST OR PLEASURE IN DOING THINGS: 0
2. FEELING DOWN, DEPRESSED OR HOPELESS: 0
SUM OF ALL RESPONSES TO PHQ QUESTIONS 1-9: 0

## 2022-04-28 ASSESSMENT — LIFESTYLE VARIABLES
HOW MANY STANDARD DRINKS CONTAINING ALCOHOL DO YOU HAVE ON A TYPICAL DAY: 1 OR 2
HOW OFTEN DO YOU HAVE A DRINK CONTAINING ALCOHOL: 2-4 TIMES A MONTH

## 2022-04-28 ASSESSMENT — SOCIAL DETERMINANTS OF HEALTH (SDOH): HOW HARD IS IT FOR YOU TO PAY FOR THE VERY BASICS LIKE FOOD, HOUSING, MEDICAL CARE, AND HEATING?: NOT HARD AT ALL

## 2022-04-28 NOTE — PATIENT INSTRUCTIONS
Personalized Preventive Plan for Kailey Dutta - 4/28/2022  Medicare offers a range of preventive health benefits. Some of the tests and screenings are paid in full while other may be subject to a deductible, co-insurance, and/or copay. Some of these benefits include a comprehensive review of your medical history including lifestyle, illnesses that may run in your family, and various assessments and screenings as appropriate. After reviewing your medical record and screening and assessments performed today your provider may have ordered immunizations, labs, imaging, and/or referrals for you. A list of these orders (if applicable) as well as your Preventive Care list are included within your After Visit Summary for your review. Other Preventive Recommendations:    · A preventive eye exam performed by an eye specialist is recommended every 1-2 years to screen for glaucoma; cataracts, macular degeneration, and other eye disorders. · A preventive dental visit is recommended every 6 months. · Try to get at least 150 minutes of exercise per week or 10,000 steps per day on a pedometer . · Order or download the FREE \"Exercise & Physical Activity: Your Everyday Guide\" from [employer]. Call 0-162.773.9226 or search [employer] online. · You need 0898-1578 mg of calcium and 6325-6104 IU of vitamin D per day. It is possible to meet your calcium requirement with diet alone, but a vitamin D supplement is usually necessary to meet this goal.  · When exposed to the sun, use a sunscreen that protects against both UVA and UVB radiation with an SPF of 30 or greater. Reapply every 2 to 3 hours or after sweating, drying off with a towel, or swimming. · Always wear a seat belt when traveling in a car. Always wear a helmet when riding a bicycle or motorcycle.

## 2022-05-22 ENCOUNTER — APPOINTMENT (OUTPATIENT)
Dept: GENERAL RADIOLOGY | Age: 72
End: 2022-05-22
Payer: MEDICARE

## 2022-05-22 ENCOUNTER — HOSPITAL ENCOUNTER (EMERGENCY)
Age: 72
Discharge: HOME | End: 2022-05-22
Payer: MEDICARE

## 2022-05-22 VITALS
DIASTOLIC BLOOD PRESSURE: 77 MMHG | HEIGHT: 63 IN | WEIGHT: 159 LBS | OXYGEN SATURATION: 99 % | HEART RATE: 60 BPM | SYSTOLIC BLOOD PRESSURE: 130 MMHG | RESPIRATION RATE: 16 BRPM | TEMPERATURE: 98.1 F | BODY MASS INDEX: 28.17 KG/M2

## 2022-05-22 DIAGNOSIS — S91.331A PUNCTURE WOUND OF PLANTAR ASPECT OF RIGHT FOOT, INITIAL ENCOUNTER: Primary | ICD-10-CM

## 2022-05-22 PROCEDURE — 6360000002 HC RX W HCPCS: Performed by: PHYSICIAN ASSISTANT

## 2022-05-22 PROCEDURE — 99284 EMERGENCY DEPT VISIT MOD MDM: CPT

## 2022-05-22 PROCEDURE — 90471 IMMUNIZATION ADMIN: CPT | Performed by: PHYSICIAN ASSISTANT

## 2022-05-22 PROCEDURE — 90715 TDAP VACCINE 7 YRS/> IM: CPT | Performed by: PHYSICIAN ASSISTANT

## 2022-05-22 PROCEDURE — 73630 X-RAY EXAM OF FOOT: CPT

## 2022-05-22 RX ORDER — CEPHALEXIN 250 MG/1
500 CAPSULE ORAL ONCE
Status: DISCONTINUED | OUTPATIENT
Start: 2022-05-22 | End: 2022-05-22 | Stop reason: HOSPADM

## 2022-05-22 RX ORDER — CEPHALEXIN 500 MG/1
500 CAPSULE ORAL 4 TIMES DAILY
Qty: 28 CAPSULE | Refills: 0 | Status: SHIPPED | OUTPATIENT
Start: 2022-05-22 | End: 2022-05-29

## 2022-05-22 RX ADMIN — TETANUS TOXOID, REDUCED DIPHTHERIA TOXOID AND ACELLULAR PERTUSSIS VACCINE, ADSORBED 0.5 ML: 5; 2.5; 8; 8; 2.5 SUSPENSION INTRAMUSCULAR at 13:02

## 2022-05-22 ASSESSMENT — PAIN DESCRIPTION - LOCATION: LOCATION: FOOT

## 2022-05-22 ASSESSMENT — PAIN SCALES - GENERAL: PAINLEVEL_OUTOF10: 4

## 2022-05-22 ASSESSMENT — PAIN - FUNCTIONAL ASSESSMENT: PAIN_FUNCTIONAL_ASSESSMENT: 0-10

## 2022-05-22 NOTE — ED PROVIDER NOTES
[facility]      CHIEF COMPLAINT       Chief Complaint   Patient presents with    Foot Injury     stepped on nail, right foot       Nurses Notes reviewed and I agree except as noted in the HPI. HISTORY OF PRESENT ILLNESS    Jocelin Lepe is a 74 y.o. female who presents for puncture wound of right foot. Patient states that she was walking outside barefoot with her grandkids last evening around 20:00 when she stepped on a magnus nail. Patient states that she pulled the nail out right away and cleaned the wound thoroughly. Patient states that Advil has helped with her pain. She describes it as dull, achy pain that 4/10 in severity. She notes some swelling and erythema to the foot. Patient states there has been minimal blood coming from the wound today. Last tetanus is unknown. Location/Symptom: plantar surface of right foot   Timing/Onset: Last evening; 20:00   Context/Setting: pain with ambulation, movement  Quality: mild  Duration: constant   Modifying Factors: ibuprofen  Severity: 4/10 pain    REVIEW OF SYSTEMS     Review of Systems   Constitutional: Negative for diaphoresis and fever. HENT: Negative for rhinorrhea. Eyes: Negative for photophobia. Respiratory: Negative for shortness of breath. Cardiovascular: Negative for chest pain. Gastrointestinal: Negative for nausea and vomiting. Musculoskeletal: Positive for gait problem and myalgias. Skin: Positive for wound. Negative for rash. Neurological: Negative for weakness and numbness. Psychiatric/Behavioral: Negative for confusion. PAST MEDICAL HISTORY    has a past medical history of Hyperlipidemia, Hypertension, and Osteopenia. SURGICAL HISTORY      has a past surgical history that includes Inguinal hernia repair; Colonoscopy; Tubal ligation; Vein Surgery (Right, 08/08/2018); Femur fracture surgery (Right, 4/21/2021); knee surgery; and shoulder surgery.     CURRENT MEDICATIONS       Discharge Medication List as of 2022  1:40 PM      CONTINUE these medications which have NOT CHANGED    Details   metoprolol succinate (TOPROL XL) 25 MG extended release tablet TAKE 1 TABLET DAILY, Disp-90 tablet, R-3Normal      rosuvastatin (CRESTOR) 20 MG tablet Take 1 tablet by mouth daily, Disp-90 tablet, R-3Normal      losartan (COZAAR) 50 MG tablet Take 1 tablet by mouth daily, Disp-90 tablet, R-3Normal      ezetimibe (ZETIA) 10 MG tablet Take 1 tablet by mouth daily, Disp-90 tablet, R-3Normal      aspirin 81 MG EC tablet Take 81 mg by mouth dailyHistorical Med      Calcium-Phosphorus-Vitamin D (CITRACAL +D3 PO) Take by mouth dailyHistorical Med      ibuprofen (ADVIL;MOTRIN) 200 MG tablet Take 200 mg by mouth every 6 hours as needed for PainHistorical Med      Multiple Vitamins-Minerals (CENTRUM MULTI + OMEGA 3 PO) Take by mouthHistorical Med             ALLERGIES     is allergic to demerol hcl [meperidine]. FAMILY HISTORY     She indicated that her mother is . She indicated that her father is . She indicated that the status of her sister is unknown. She indicated that her brother is . She indicated that the status of her paternal grandfather is unknown. She indicated that the status of her maternal aunt is unknown.   family history includes Breast Cancer (age of onset: 27) in her maternal cousin; Breast Cancer (age of onset: 54) in her maternal aunt, maternal cousin, paternal aunt, paternal aunt, and sister; Colon Cancer in her mother; Heart Attack in her brother; Heart Disease in her sister; Heart Failure in her father; Stomach Cancer in her paternal grandfather. SOCIAL HISTORY    reports that she has never smoked. She has never used smokeless tobacco. She reports current alcohol use. She reports that she does not use drugs. PHYSICAL EXAM     INITIAL VITALS:  height is 5' 3\" (1.6 m) and weight is 159 lb (72.1 kg). Her oral temperature is 98.1 °F (36.7 °C).  Her blood pressure is 130/77 and her pulse is 60. Her respiration is 16 and oxygen saturation is 99%. Physical Exam  Constitutional:       General: She is not in acute distress. Appearance: She is well-developed. She is not toxic-appearing. HENT:      Head: Normocephalic and atraumatic. Right Ear: Hearing normal.      Left Ear: Hearing normal.      Nose: Nose normal. No nasal deformity. Mouth/Throat:      Mouth: Mucous membranes are moist.      Pharynx: Oropharynx is clear. Eyes:      General: Lids are normal.      Extraocular Movements: Extraocular movements intact. Conjunctiva/sclera: Conjunctivae normal.   Neck:      Trachea: Trachea normal. No tracheal deviation. Cardiovascular:      Rate and Rhythm: Normal rate and regular rhythm. Pulses:           Dorsalis pedis pulses are 2+ on the right side and 2+ on the left side. Posterior tibial pulses are 2+ on the right side and 2+ on the left side. Pulmonary:      Effort: Pulmonary effort is normal. No tachypnea. Abdominal:      General: There is no distension. Palpations: Abdomen is soft. Musculoskeletal:      Cervical back: No rigidity. Feet:    Skin:     General: Skin is warm and dry. Capillary Refill: Capillary refill takes less than 2 seconds. Coloration: Skin is not pale. Findings: No rash. Neurological:      Mental Status: She is alert. GCS: GCS eye subscore is 4. GCS verbal subscore is 5. GCS motor subscore is 6. Sensory: No sensory deficit. Motor: No weakness. Coordination: Coordination normal.      Gait: Gait normal.   Psychiatric:         Speech: Speech normal.         Behavior: Behavior normal. Behavior is cooperative. Thought Content:  Thought content normal.         DIFFERENTIAL DIAGNOSIS:   Including but not limited to: Plantar puncture wound, foreign body, infection, fracture    DIAGNOSTIC RESULTS     EKG: All E[facility]'s are [facility] who either signs or Co-signs this chart in the absence of a cardiologist.  None    RADIOLOGY: non-plain film images(s) such as CT,Ultrasound and MRI are read by the radiologist.  Plain radiographic images are visualized and preliminarily interpreted by the emergency physician unless otherwise stated below. XR FOOT RIGHT (MIN 3 VIEWS)   Final Result   No acute findings            **This report has been created using voice recognition software. It may contain minor errors which are inherent in voice recognition technology. **      Final report electronically signed by Dr. Carla Summers on 5/22/2022 1:24 PM          LABS:   Labs Reviewed - No data to display    EMERGENCY DEPARTMENT COURSE:   Vitals:    Vitals:    05/22/22 1236   BP: 130/77   Pulse: 60   Resp: 16   Temp: 98.1 °F (36.7 °C)   TempSrc: Oral   SpO2: 99%   Weight: 159 lb (72.1 kg)   Height: 5' 3\" (1.6 m)       Patient was seen in the emergency department during the global pandemic, when there was surge capacity and regional healthcare crisis. MDM:    Patient presents today after stepping on a magnus nail last evening at 20:00. Patient states she was outside barefoot with her grandchildren when she stepped onto the nail on the plantar surface of her right foot. There is mild erythema and swelling to the foot with puncture with minimal elevation. Normal sensation and strength to the foot was noted with minimal pain around the puncture site. An x-ray of the right foot was ordered to rule-out if foreign body was present in the wound. No foreign body was noted. Patient was discharged home with Keflex 500 mg QID for 7 days. TDaP vaccine was administered prior to discharge. Patient to follow-up with PCP if there any signs of infection including fever, chills, increased erythema or swelling to the wound. CRITICAL CARE:   None    CONSULTS:  None    PROCEDURES:  None    FINAL IMPRESSION      1.  Puncture wound of plantar aspect of right foot, initial encounter DISPOSITION/PLAN     1.  Puncture wound of plantar aspect of right foot, initial encounter        PATIENT REFERRED TO:  Adrián [id number]  [facility] [phone]    Schedule an appointment as soon as possible for a visit in 2 days  For wound re-check      DISCHARGE MEDICATIONS:  Discharge Medication List as of 5/22/2022  1:40 PM      START taking these medications    Details   cephALEXin (KEFLEX) 500 MG capsule Take 1 capsule by mouth 4 times daily for 7 days, Disp-28 capsule, R-0Print             (Please note that portions of this note were completed with a voice recognition program.  Efforts were made to edit the dictations but occasionally words are mis-transcribed.)    Mimi Solo PA-C 05/23/22 8:24 PM    CATRINA Oliveros PA-C  05/23/22 2027

## 2022-05-23 ASSESSMENT — ENCOUNTER SYMPTOMS
VOMITING: 0
RHINORRHEA: 0
NAUSEA: 0
PHOTOPHOBIA: 0
SHORTNESS OF BREATH: 0

## 2022-05-24 NOTE — PROGRESS NOTES
[address]  [address] [place] [address]  Phone:  [phone]          Name: Lalit De Jesus  : 1950    Chief Complaint   Patient presents with    Puncture Wound       HPI:     Lalit De Jesus is a 74 y.o. female who presents today for evaluation of a nail puncture to her right foot. She was at her daughter's house in [place] and was on the back patio without shoes on. She was walking on a stone path and stepped on a large magnus murray nail. She went to [facility] ER on  and had an xray which showed no acute findings. She was updated on her tetanus vaccine and discharged on Keflex. She's tolerating it well. Current Outpatient Medications:     cephALEXin (KEFLEX) 500 MG capsule, Take 1 capsule by mouth 4 times daily for 7 days, Disp: 28 capsule, Rfl: 0    metoprolol succinate (TOPROL XL) 25 MG extended release tablet, TAKE 1 TABLET DAILY, Disp: 90 tablet, Rfl: 3    rosuvastatin (CRESTOR) 20 MG tablet, Take 1 tablet by mouth daily, Disp: 90 tablet, Rfl: 3    losartan (COZAAR) 50 MG tablet, Take 1 tablet by mouth daily, Disp: 90 tablet, Rfl: 3    ezetimibe (ZETIA) 10 MG tablet, Take 1 tablet by mouth daily, Disp: 90 tablet, Rfl: 3    aspirin 81 MG EC tablet, Take 81 mg by mouth daily, Disp: , Rfl:     Calcium-Phosphorus-Vitamin D (CITRACAL +D3 PO), Take by mouth daily, Disp: , Rfl:     ibuprofen (ADVIL;MOTRIN) 200 MG tablet, Take 200 mg by mouth every 6 hours as needed for Pain, Disp: , Rfl:     Multiple Vitamins-Minerals (CENTRUM MULTI + OMEGA 3 PO), Take by mouth, Disp: , Rfl:     Allergies   Allergen Reactions    Demerol Hcl [Meperidine] Nausea And Vomiting     vomitting       Subjective:      Review of Systems   Constitutional: Negative for chills and fever. Skin: Positive for wound. Negative for color change.        Objective:     /70 (Site: Left Upper Arm, Position: Sitting, Cuff Size: Medium Adult)   Pulse 64   Temp 97.3 °F (36.3 °C) (Temporal)   Resp 16   Ht 5' 2.99\" (1.6 m)   LMP  (LMP Unknown)   SpO2 97%   BMI 28.17 kg/m²     Physical Exam  Vitals and nursing note reviewed. Constitutional:       General: She is not in acute distress. Appearance: She is well-developed. HENT:      Head: Normocephalic and atraumatic. Nose: Nose normal.   Eyes:      Conjunctiva/sclera: Conjunctivae normal.   Cardiovascular:      Rate and Rhythm: Normal rate and regular rhythm. Heart sounds: Normal heart sounds. Pulmonary:      Effort: Pulmonary effort is normal. No respiratory distress. Breath sounds: Normal breath sounds. No wheezing. Musculoskeletal:      Cervical back: Normal range of motion and neck supple. Skin:     General: Skin is warm and dry. Comments: Small puncture wound plantar surface right foot with no increased erythema, warmth, or drainage. Neurological:      Mental Status: She is alert and oriented to person, place, and time. Psychiatric:         Behavior: Behavior normal.       Assessment/Plan:     Sumaya Sy was seen today for puncture wound. Diagnoses and all orders for this visit:    Puncture wound of right foot, subsequent encounter        -     The wound is healing well with no increased erythema, warmth, or drainage. She was encouraged to complete the course of Keflex. She is UTD on her tetanus vaccine. Return if symptoms worsen or fail to improve.     Electronically signed by Keith Croft MD on 5/25/2022 at 9:52 AM

## 2022-05-25 ENCOUNTER — OFFICE VISIT (OUTPATIENT)
Dept: FAMILY MEDICINE CLINIC | Age: 72
End: 2022-05-25
Payer: MEDICARE

## 2022-05-25 VITALS
RESPIRATION RATE: 16 BRPM | TEMPERATURE: 97.3 F | OXYGEN SATURATION: 97 % | HEART RATE: 64 BPM | SYSTOLIC BLOOD PRESSURE: 132 MMHG | DIASTOLIC BLOOD PRESSURE: 70 MMHG | HEIGHT: 63 IN | BODY MASS INDEX: 28.17 KG/M2

## 2022-05-25 DIAGNOSIS — S91.331D PUNCTURE WOUND OF RIGHT FOOT, SUBSEQUENT ENCOUNTER: Primary | ICD-10-CM

## 2022-05-25 PROCEDURE — 1123F ACP DISCUSS/DSCN MKR DOCD: CPT | Performed by: FAMILY MEDICINE

## 2022-05-25 PROCEDURE — 99213 OFFICE O/P EST LOW 20 MIN: CPT | Performed by: FAMILY MEDICINE

## 2022-05-25 ASSESSMENT — ENCOUNTER SYMPTOMS: COLOR CHANGE: 0

## 2022-11-07 ASSESSMENT — ENCOUNTER SYMPTOMS
COUGH: 0
NAUSEA: 0
SHORTNESS OF BREATH: 0
VOMITING: 0
EYE REDNESS: 0
RHINORRHEA: 0

## 2022-11-07 NOTE — PROGRESS NOTES
[address]  [address] [place] [address]  Phone:  [phone]       PREOPERATIVE CLEARANCE     Name: Gen Bro  : 1950        Chief Complaint:     Chief Complaint   Patient presents with    Pre-op Exam     Cataract surgery  and        History of Present Illness: The patient is presenting today for preoperative clearance for cataract surgery on 22 (right eye) and 22 (left eye) with Dr. Aline Dean. When she went for her examination there, her BP was elevated at 163/71. Neoma Blood states that it's usually better at home in the 130s/80s.     Functional capacity:  > 4 mets (can vacuum/do housework, climb a flight of stairs without dyspnea)?:  Yes    Stress test or cardiac cath within last 5 years?:  Isai Pyle 2020 negative for ischemia  Revascularization in last 5 years?:  No  Any current cardiac symptoms?:  No  Smoker?: No  Prior adverse reaction to anesthesia?:  No  Neck pathology?:  No  Sleep apnea?:  No      Past Medical History:     Past Medical History:   Diagnosis Date    Hyperlipidemia     Hypertension     Osteopenia         Past Surgical History:     Past Surgical History:   Procedure Laterality Date    COLONOSCOPY      Dr Kareen Disla   last     FEMUR FRACTURE SURGERY Right 2021    RIGHT FEMUR METATAN performed by George Mendoza MD at [address] [place] [address] Right 2018    Endoveous Ablation        Family History:     Family History   Problem Relation Age of Onset    Colon Cancer Mother     Heart Failure Father     Heart Disease Sister     Breast Cancer Sister 54    Heart Attack Brother     Stomach Cancer Paternal Grandfather     Breast Cancer Paternal Aunt 54    Breast Cancer Paternal Aunt 54    Breast Cancer Maternal Aunt 55    Breast Cancer Maternal Cousin 54    Breast Cancer Maternal Cousin 30       Social History:     Social:    Social History Socioeconomic History    Marital status:      Spouse name: Not on file    Number of children: Not on file    Years of education: Not on file    Highest education level: Not on file   Occupational History    Not on file   Tobacco Use    Smoking status: Never    Smokeless tobacco: Never   Vaping Use    Vaping Use: Never used   Substance and Sexual Activity    Alcohol use: Yes     Comment: occasional    Drug use: No    Sexual activity: Not on file   Other Topics Concern    Not on file   Social History Narrative    Not on file     Social Determinants of Health     Financial Resource Strain: Low Risk     Difficulty of Paying Living Expenses: Not hard at all   Food Insecurity: No Food Insecurity    Worried About Running Out of Food in the Last Year: Never true    Bradley of Food in the Last Year: Never true   Transportation Needs: Not on file   Physical Activity: Sufficiently Active    Days of Exercise per Week: 7 days    Minutes of Exercise per Session: 30 min   Stress: Not on file   Social Connections: Not on file   Intimate Partner Violence: Not on file   Housing Stability: Not on file       Allergies:        Demerol hcl [meperidine]    Medications:       Current Outpatient Medications   Medication Sig Dispense Refill    metoprolol succinate (TOPROL XL) 25 MG extended release tablet TAKE 1 TABLET DAILY 90 tablet 3    rosuvastatin (CRESTOR) 20 MG tablet Take 1 tablet by mouth daily 90 tablet 3    losartan (COZAAR) 50 MG tablet Take 1 tablet by mouth daily 90 tablet 3    ezetimibe (ZETIA) 10 MG tablet Take 1 tablet by mouth daily 90 tablet 3    aspirin 81 MG EC tablet Take 81 mg by mouth daily      Calcium-Phosphorus-Vitamin D (CITRACAL +D3 PO) Take by mouth daily      ibuprofen (ADVIL;MOTRIN) 200 MG tablet Take 200 mg by mouth every 6 hours as needed for Pain      Multiple Vitamins-Minerals (CENTRUM MULTI + OMEGA 3 PO) Take by mouth       No current facility-administered medications for this visit. Review of Systems:      Review of Systems   Constitutional:  Negative for chills and fever. HENT:  Negative for congestion and rhinorrhea. Eyes:  Positive for visual disturbance. Negative for redness. Respiratory:  Negative for cough and shortness of breath. Cardiovascular:  Negative for chest pain and palpitations. Gastrointestinal:  Negative for nausea and vomiting. Genitourinary:  Negative for dysuria and frequency. Musculoskeletal:  Negative for arthralgias and myalgias. Neurological:  Negative for weakness and headaches. Psychiatric/Behavioral:  Negative for dysphoric mood. The patient is not nervous/anxious. Physical Exam:     Vitals:  Blood pressure 138/76, pulse 55, temperature 97 °F (36.1 °C), temperature source Temporal, resp. rate 17, height 5' 2.99\" (1.6 m), weight 162 lb (73.5 kg), SpO2 98 %, not currently breastfeeding. Physical Exam  Vitals and nursing note reviewed. Constitutional:       General: She is not in acute distress. Appearance: She is well-developed. HENT:      Head: Normocephalic and atraumatic. Right Ear: Tympanic membrane, ear canal and external ear normal.      Left Ear: Tympanic membrane, ear canal and external ear normal.      Nose: Nose normal.   Eyes:      Conjunctiva/sclera: Conjunctivae normal.   Cardiovascular:      Rate and Rhythm: Normal rate and regular rhythm. Heart sounds: Normal heart sounds. Pulmonary:      Effort: Pulmonary effort is normal. No respiratory distress. Breath sounds: Normal breath sounds. No wheezing. Abdominal:      General: Bowel sounds are normal. There is no distension. Palpations: Abdomen is soft. Tenderness: There is no abdominal tenderness. Musculoskeletal:      Cervical back: Normal range of motion and neck supple. Skin:     General: Skin is warm and dry. Neurological:      Mental Status: She is alert and oriented to person, place, and time.    Psychiatric: Behavior: Behavior normal.       Assessment:     Berta Osborne was seen today for pre-op exam.    Diagnoses and all orders for this visit:    Preoperative clearance  Age-related cataract of both eyes, unspecified age-related cataract type        -     The patient is considered acceptable risk for the upcoming surgical procedure. Preoperative forms were completed and faxed over.       Electronically signed Janes García MD on 11/9/2022 at 10:32 AM

## 2022-11-09 ENCOUNTER — OFFICE VISIT (OUTPATIENT)
Dept: FAMILY MEDICINE CLINIC | Age: 72
End: 2022-11-09
Payer: MEDICARE

## 2022-11-09 VITALS
WEIGHT: 162 LBS | OXYGEN SATURATION: 98 % | SYSTOLIC BLOOD PRESSURE: 138 MMHG | BODY MASS INDEX: 28.7 KG/M2 | TEMPERATURE: 97 F | HEIGHT: 63 IN | RESPIRATION RATE: 17 BRPM | DIASTOLIC BLOOD PRESSURE: 76 MMHG | HEART RATE: 55 BPM

## 2022-11-09 DIAGNOSIS — Z01.818 PREOPERATIVE CLEARANCE: Primary | ICD-10-CM

## 2022-11-09 DIAGNOSIS — H25.9 AGE-RELATED CATARACT OF BOTH EYES, UNSPECIFIED AGE-RELATED CATARACT TYPE: ICD-10-CM

## 2022-11-09 PROCEDURE — 99213 OFFICE O/P EST LOW 20 MIN: CPT | Performed by: FAMILY MEDICINE

## 2022-11-09 PROCEDURE — 3074F SYST BP LT 130 MM HG: CPT | Performed by: FAMILY MEDICINE

## 2022-11-09 PROCEDURE — 3078F DIAST BP <80 MM HG: CPT | Performed by: FAMILY MEDICINE

## 2022-12-28 ENCOUNTER — OFFICE VISIT (OUTPATIENT)
Dept: CARDIOLOGY CLINIC | Age: 72
End: 2022-12-28
Payer: MEDICARE

## 2022-12-28 VITALS
SYSTOLIC BLOOD PRESSURE: 149 MMHG | BODY MASS INDEX: 28.88 KG/M2 | DIASTOLIC BLOOD PRESSURE: 80 MMHG | HEIGHT: 63 IN | HEART RATE: 68 BPM | WEIGHT: 163 LBS

## 2022-12-28 DIAGNOSIS — I10 PRIMARY HYPERTENSION: Primary | ICD-10-CM

## 2022-12-28 DIAGNOSIS — E78.01 FAMILIAL HYPERCHOLESTEROLEMIA: ICD-10-CM

## 2022-12-28 PROCEDURE — 3074F SYST BP LT 130 MM HG: CPT | Performed by: NUCLEAR MEDICINE

## 2022-12-28 PROCEDURE — 99213 OFFICE O/P EST LOW 20 MIN: CPT | Performed by: NUCLEAR MEDICINE

## 2022-12-28 PROCEDURE — 1123F ACP DISCUSS/DSCN MKR DOCD: CPT | Performed by: NUCLEAR MEDICINE

## 2022-12-28 PROCEDURE — 3078F DIAST BP <80 MM HG: CPT | Performed by: NUCLEAR MEDICINE

## 2022-12-28 PROCEDURE — 93000 ELECTROCARDIOGRAM COMPLETE: CPT | Performed by: NUCLEAR MEDICINE

## 2022-12-28 NOTE — PROGRESS NOTES
[id number] [facility] [place]  [facility] [address]  [address]  [facility] [address]  Dept: [phone]  Dept Fax: [phone]  Loc: [phone]    Visit Date: 12/28/2022    Lb Farr is a 74 y.o. female who presents todayfor:  Chief Complaint   Patient presents with    1 Year Follow Up    Hypertension    Hyperlipidemia   Known risk for CAD  No chest pain  No changes in breathing  BP is stable   No dizziness  No syncope  On statins for hyperlipidemia        HPI:  HPI  Past Medical History:   Diagnosis Date    Hyperlipidemia     Hypertension     Osteopenia       Past Surgical History:   Procedure Laterality Date    COLONOSCOPY      Dr Kvng Allen   last 8-2017    FEMUR FRACTURE SURGERY Right 4/21/2021    RIGHT FEMUR METATAN performed by Manav Herrera MD at [address] Right 08/08/2018    Endoveous Ablation     Family History   Problem Relation Age of Onset    Colon Cancer Mother     Heart Failure Father     Heart Disease Sister     Breast Cancer Sister 54    Heart Attack Brother     Stomach Cancer Paternal Grandfather     Breast Cancer Paternal Aunt 54    Breast Cancer Paternal Aunt 54    Breast Cancer Maternal Aunt 55    Breast Cancer Maternal Cousin 54    Breast Cancer Maternal Cousin 30     Social History     Tobacco Use    Smoking status: Never    Smokeless tobacco: Never   Substance Use Topics    Alcohol use: Yes     Comment: occasional      Current Outpatient Medications   Medication Sig Dispense Refill    metoprolol succinate (TOPROL XL) 25 MG extended release tablet TAKE 1 TABLET DAILY 90 tablet 3    rosuvastatin (CRESTOR) 20 MG tablet Take 1 tablet by mouth daily 90 tablet 3    losartan (COZAAR) 50 MG tablet Take 1 tablet by mouth daily 90 tablet 3    ezetimibe (ZETIA) 10 MG tablet Take 1 tablet by mouth daily 90 tablet 3    aspirin 81 MG EC tablet Take 81 mg by mouth daily      Calcium-Phosphorus-Vitamin D (CITRACAL +D3 PO) Take by mouth daily      ibuprofen (ADVIL;MOTRIN) 200 MG tablet Take 200 mg by mouth every 6 hours as needed for Pain      Multiple Vitamins-Minerals (CENTRUM MULTI + OMEGA 3 PO) Take by mouth       No current facility-administered medications for this visit. Allergies   Allergen Reactions    Demerol Hcl [Meperidine] Nausea And Vomiting     vomitting     Health Maintenance   Topic Date Due    Hepatitis C screen  Never done    Lipids  04/28/2023    Depression Screen  04/28/2023    Breast cancer screen  04/28/2023    Annual Wellness Visit (AWV)  04/29/2023    Colorectal Cancer Screen  09/04/2025    DTaP/Tdap/Td vaccine (2 - Td or Tdap) 05/22/2032    DEXA (modify frequency per FRAX score)  Completed    Flu vaccine  Completed    Shingles vaccine  Completed    Pneumococcal 65+ years Vaccine  Completed    COVID-19 Vaccine  Completed    Hepatitis A vaccine  Aged Out    Hib vaccine  Aged Out    Meningococcal (ACWY) vaccine  Aged Out       Subjective:  Review of Systems  General:   No fever, no chills, No fatigue or weight loss  Pulmonary:    No dyspnea, no wheezing  Cardiac:    Denies recent chest pain,   GI:     No nausea or vomiting, no abdominal pain  Neuro:    No dizziness or light headedness,   Musculoskeletal:  No recent active issues  Extremities:   No edema, no obvious claudication     Objective:  Physical Exam  BP (!) 149/80   Pulse 68   Ht 5' 3\" (1.6 m)   Wt 163 lb (73.9 kg)   LMP  (LMP Unknown) Comment: menopausal   BMI 28.87 kg/m²   General:   Well developed, well nourished  Lungs:   Clear to auscultation  Heart:    Normal S1 S2, Slight murmur. no rubs, no gallops  Abdomen:   Soft, non tender, no organomegalies, positive bowel sounds  Extremities:   No edema, no cyanosis, good peripheral pulses  Neurological:   Awake, alert, oriented.  No obvious focal deficits  Musculoskelatal:  No obvious deformities    Assessment:      Diagnosis Orders 1. Primary hypertension  EKG 12 lead      2. Familial hypercholesterolemia        As above  Cardiac fair for now  ECG in office was done today. I reviewed the ECG. No acute findings        Plan:  No follow-ups on file. As above  Continue risk factor modification and medical management  Thank you for allowing me to participate in the care of your patient. Please don't hesitate to contact me regarding any further issues related to the patient care    Orders Placed:  Orders Placed This Encounter   Procedures    EKG 12 lead     Order Specific Question:   Reason for Exam?     Answer: Other       Medications Prescribed:  No orders of the defined types were placed in this encounter. Discussed use, benefit, and side effects of prescribed medications. All patient questions answered. Pt voicedunderstanding. Instructed to continue current medications, diet and exercise. Continue risk factor modification and medical management. Patient agreed with treatment plan. Follow up as directed.     Electronically signedby Aquiles Wills MD on 12/28/2022 at 8:42 AM

## 2023-05-11 RX ORDER — METOPROLOL SUCCINATE 25 MG/1
TABLET, EXTENDED RELEASE ORAL
Qty: 90 TABLET | Refills: 1 | Status: SHIPPED | OUTPATIENT
Start: 2023-05-11

## 2023-05-24 ENCOUNTER — TELEPHONE (OUTPATIENT)
Dept: FAMILY MEDICINE CLINIC | Age: 73
End: 2023-05-24

## 2023-06-26 ENCOUNTER — HOSPITAL ENCOUNTER (OUTPATIENT)
Age: 73
Discharge: HOME | End: 2023-06-26
Payer: MEDICARE

## 2023-06-26 ENCOUNTER — HOSPITAL ENCOUNTER (OUTPATIENT)
Dept: MAMMOGRAPHY | Age: 73
Discharge: HOME | End: 2023-06-26
Payer: MEDICARE

## 2023-06-26 DIAGNOSIS — Z12.31 VISIT FOR SCREENING MAMMOGRAM: ICD-10-CM

## 2023-06-26 PROCEDURE — 77063 BREAST TOMOSYNTHESIS BI: CPT

## 2023-06-29 ENCOUNTER — HOSPITAL ENCOUNTER (OUTPATIENT)
Dept: WOMENS IMAGING | Age: 73
Discharge: HOME | End: 2023-06-29
Attending: RADIOLOGY
Payer: MEDICARE

## 2023-06-29 DIAGNOSIS — R92.2 BREAST DENSITY: ICD-10-CM

## 2023-06-29 PROCEDURE — G0279 TOMOSYNTHESIS, MAMMO: HCPCS

## 2023-06-29 PROCEDURE — 76642 ULTRASOUND BREAST LIMITED: CPT

## 2023-06-30 DIAGNOSIS — Z09 FOLLOW-UP EXAM: ICD-10-CM

## 2023-06-30 DIAGNOSIS — R92.2 BREAST DENSITY: Primary | ICD-10-CM

## 2023-07-09 ASSESSMENT — ENCOUNTER SYMPTOMS: COLOR CHANGE: 1

## 2023-07-11 ENCOUNTER — OFFICE VISIT (OUTPATIENT)
Dept: FAMILY MEDICINE CLINIC | Age: 73
End: 2023-07-11
Payer: MEDICARE

## 2023-07-11 VITALS
WEIGHT: 161 LBS | RESPIRATION RATE: 16 BRPM | SYSTOLIC BLOOD PRESSURE: 130 MMHG | HEART RATE: 77 BPM | TEMPERATURE: 98.3 F | DIASTOLIC BLOOD PRESSURE: 78 MMHG | HEIGHT: 63 IN | BODY MASS INDEX: 28.53 KG/M2

## 2023-07-11 DIAGNOSIS — Z00.00 ENCOUNTER FOR MEDICARE ANNUAL WELLNESS EXAM: Primary | ICD-10-CM

## 2023-07-11 DIAGNOSIS — L72.0 EPIDERMAL CYST OF NECK: ICD-10-CM

## 2023-07-11 DIAGNOSIS — I10 ESSENTIAL HYPERTENSION: ICD-10-CM

## 2023-07-11 DIAGNOSIS — E78.00 PURE HYPERCHOLESTEROLEMIA: ICD-10-CM

## 2023-07-11 PROCEDURE — 99213 OFFICE O/P EST LOW 20 MIN: CPT | Performed by: FAMILY MEDICINE

## 2023-07-11 PROCEDURE — 1123F ACP DISCUSS/DSCN MKR DOCD: CPT | Performed by: FAMILY MEDICINE

## 2023-07-11 PROCEDURE — 3078F DIAST BP <80 MM HG: CPT | Performed by: FAMILY MEDICINE

## 2023-07-11 PROCEDURE — G0439 PPPS, SUBSEQ VISIT: HCPCS | Performed by: FAMILY MEDICINE

## 2023-07-11 PROCEDURE — 3075F SYST BP GE 130 - 139MM HG: CPT | Performed by: FAMILY MEDICINE

## 2023-07-11 ASSESSMENT — LIFESTYLE VARIABLES
HOW OFTEN DURING THE LAST YEAR HAVE YOU FOUND THAT YOU WERE NOT ABLE TO STOP DRINKING ONCE YOU HAD STARTED: 0
HOW OFTEN DURING THE LAST YEAR HAVE YOU NEEDED AN ALCOHOLIC DRINK FIRST THING IN THE MORNING TO GET YOURSELF GOING AFTER A NIGHT OF HEAVY DRINKING: 0
HOW OFTEN DURING THE LAST YEAR HAVE YOU FAILED TO DO WHAT WAS NORMALLY EXPECTED FROM YOU BECAUSE OF DRINKING: 0
HOW MANY STANDARD DRINKS CONTAINING ALCOHOL DO YOU HAVE ON A TYPICAL DAY: 1 OR 2
HOW OFTEN DURING THE LAST YEAR HAVE YOU HAD A FEELING OF GUILT OR REMORSE AFTER DRINKING: 0
HAVE YOU OR SOMEONE ELSE BEEN INJURED AS A RESULT OF YOUR DRINKING: 0
HOW OFTEN DO YOU HAVE A DRINK CONTAINING ALCOHOL: 4 OR MORE TIMES A WEEK
HAS A RELATIVE, FRIEND, DOCTOR, OR ANOTHER HEALTH PROFESSIONAL EXPRESSED CONCERN ABOUT YOUR DRINKING OR SUGGESTED YOU CUT DOWN: 0
HOW OFTEN DURING THE LAST YEAR HAVE YOU BEEN UNABLE TO REMEMBER WHAT HAPPENED THE NIGHT BEFORE BECAUSE YOU HAD BEEN DRINKING: 0

## 2023-07-11 ASSESSMENT — PATIENT HEALTH QUESTIONNAIRE - PHQ9
2. FEELING DOWN, DEPRESSED OR HOPELESS: 0
SUM OF ALL RESPONSES TO PHQ QUESTIONS 1-9: 0
SUM OF ALL RESPONSES TO PHQ9 QUESTIONS 1 & 2: 0
1. LITTLE INTEREST OR PLEASURE IN DOING THINGS: 0
SUM OF ALL RESPONSES TO PHQ QUESTIONS 1-9: 0

## 2023-10-09 DIAGNOSIS — I10 ESSENTIAL HYPERTENSION: ICD-10-CM

## 2023-10-09 DIAGNOSIS — E78.00 PURE HYPERCHOLESTEROLEMIA: ICD-10-CM

## 2023-10-09 RX ORDER — LOSARTAN POTASSIUM 50 MG/1
TABLET ORAL
Qty: 90 TABLET | Refills: 1 | Status: SHIPPED | OUTPATIENT
Start: 2023-10-09

## 2023-10-09 RX ORDER — EZETIMIBE 10 MG/1
TABLET ORAL
Qty: 90 TABLET | Refills: 1 | Status: SHIPPED | OUTPATIENT
Start: 2023-10-09

## 2023-10-09 RX ORDER — ROSUVASTATIN CALCIUM 20 MG/1
TABLET, COATED ORAL
Qty: 90 TABLET | Refills: 1 | Status: SHIPPED | OUTPATIENT
Start: 2023-10-09

## 2023-11-07 RX ORDER — METOPROLOL SUCCINATE 25 MG/1
TABLET, EXTENDED RELEASE ORAL
Qty: 90 TABLET | Refills: 3 | Status: SHIPPED | OUTPATIENT
Start: 2023-11-07

## 2023-12-05 ENCOUNTER — HOSPITAL ENCOUNTER (OUTPATIENT)
Age: 73
Discharge: HOME | End: 2023-12-05
Payer: MEDICARE

## 2023-12-05 DIAGNOSIS — E78.00 PURE HYPERCHOLESTEROLEMIA: ICD-10-CM

## 2023-12-05 DIAGNOSIS — I10 ESSENTIAL HYPERTENSION: ICD-10-CM

## 2023-12-05 DIAGNOSIS — Z00.00 ENCOUNTER FOR MEDICARE ANNUAL WELLNESS EXAM: ICD-10-CM

## 2023-12-05 LAB
ALBUMIN SERPL BCG-MCNC: 4.1 G/DL (ref 3.5–5.1)
ALP SERPL-CCNC: 64 U/L (ref 38–126)
ALT SERPL W/O P-5'-P-CCNC: 22 U/L (ref 11–66)
ANION GAP SERPL CALC-SCNC: 10 MEQ/L (ref 8–16)
AST SERPL-CCNC: 27 U/L (ref 5–40)
BASOPHILS ABSOLUTE: 0 THOU/MM3 (ref 0–0.1)
BASOPHILS NFR BLD AUTO: 0.7 %
BILIRUB SERPL-MCNC: 0.6 MG/DL (ref 0.3–1.2)
BUN SERPL-MCNC: 15 MG/DL (ref 7–22)
CALCIUM SERPL-MCNC: 9.8 MG/DL (ref 8.5–10.5)
CHLORIDE SERPL-SCNC: 104 MEQ/L (ref 98–111)
CHOLEST SERPL-MCNC: 193 MG/DL (ref 100–199)
CO2 SERPL-SCNC: 27 MEQ/L (ref 23–33)
CREAT SERPL-MCNC: 0.7 MG/DL (ref 0.4–1.2)
DEPRECATED RDW RBC AUTO: 52.6 FL (ref 35–45)
EOSINOPHIL NFR BLD AUTO: 2.9 %
EOSINOPHILS ABSOLUTE: 0.1 THOU/MM3 (ref 0–0.4)
ERYTHROCYTE [DISTWIDTH] IN BLOOD BY AUTOMATED COUNT: 13.3 % (ref 11.5–14.5)
GFR SERPL CREATININE-BSD FRML MDRD: > 60 ML/MIN/1.73M2
GLUCOSE SERPL-MCNC: 100 MG/DL (ref 70–108)
HCT VFR BLD AUTO: 45.3 % (ref 37–47)
HDLC SERPL-MCNC: 54 MG/DL
HGB BLD-MCNC: 14.4 GM/DL (ref 12–16)
IMM GRANULOCYTES # BLD AUTO: 0.01 THOU/MM3 (ref 0–0.07)
IMM GRANULOCYTES NFR BLD AUTO: 0.2 %
LDLC SERPL CALC-MCNC: 114 MG/DL
LYMPHOCYTES ABSOLUTE: 1.2 THOU/MM3 (ref 1–4.8)
LYMPHOCYTES NFR BLD AUTO: 29.4 %
MCH RBC QN AUTO: 33.6 PG (ref 26–33)
MCHC RBC AUTO-ENTMCNC: 31.8 GM/DL (ref 32.2–35.5)
MCV RBC AUTO: 105.8 FL (ref 81–99)
MONOCYTES ABSOLUTE: 0.4 THOU/MM3 (ref 0.4–1.3)
MONOCYTES NFR BLD AUTO: 9.5 %
NEUTROPHILS NFR BLD AUTO: 57.3 %
NRBC BLD AUTO-RTO: 0 /100 WBC
PLATELET # BLD AUTO: 178 THOU/MM3 (ref 130–400)
PMV BLD AUTO: 12.8 FL (ref 9.4–12.4)
POTASSIUM SERPL-SCNC: 4.9 MEQ/L (ref 3.5–5.2)
PROT SERPL-MCNC: 7.1 G/DL (ref 6.1–8)
RBC # BLD AUTO: 4.28 MILL/MM3 (ref 4.2–5.4)
SEGMENTED NEUTROPHILS ABSOLUTE COUNT: 2.4 THOU/MM3 (ref 1.8–7.7)
SODIUM SERPL-SCNC: 141 MEQ/L (ref 135–145)
TRIGL SERPL-MCNC: 125 MG/DL (ref 0–199)
WBC # BLD AUTO: 4.2 THOU/MM3 (ref 4.8–10.8)

## 2023-12-05 PROCEDURE — 80061 LIPID PANEL: CPT

## 2023-12-05 PROCEDURE — 85025 COMPLETE CBC W/AUTO DIFF WBC: CPT

## 2023-12-05 PROCEDURE — 80053 COMPREHEN METABOLIC PANEL: CPT

## 2023-12-05 PROCEDURE — 36415 COLL VENOUS BLD VENIPUNCTURE: CPT

## 2023-12-26 ENCOUNTER — HOSPITAL ENCOUNTER (OUTPATIENT)
Dept: WOMENS IMAGING | Age: 73
Discharge: HOME | End: 2023-12-26
Attending: RADIOLOGY
Payer: MEDICARE

## 2023-12-26 VITALS — HEIGHT: 63 IN | WEIGHT: 161 LBS | BODY MASS INDEX: 28.53 KG/M2

## 2023-12-26 DIAGNOSIS — Z09 FOLLOW-UP EXAM: ICD-10-CM

## 2023-12-26 DIAGNOSIS — R92.30 BREAST DENSITY: ICD-10-CM

## 2023-12-26 PROCEDURE — G0279 TOMOSYNTHESIS, MAMMO: HCPCS

## 2023-12-27 ENCOUNTER — OFFICE VISIT (OUTPATIENT)
Dept: CARDIOLOGY CLINIC | Age: 73
End: 2023-12-27
Payer: MEDICARE

## 2023-12-27 VITALS
WEIGHT: 163.6 LBS | SYSTOLIC BLOOD PRESSURE: 146 MMHG | DIASTOLIC BLOOD PRESSURE: 78 MMHG | BODY MASS INDEX: 28.99 KG/M2 | HEIGHT: 63 IN | HEART RATE: 70 BPM

## 2023-12-27 DIAGNOSIS — E78.01 FAMILIAL HYPERCHOLESTEROLEMIA: ICD-10-CM

## 2023-12-27 DIAGNOSIS — I10 PRIMARY HYPERTENSION: Primary | ICD-10-CM

## 2023-12-27 PROCEDURE — 93000 ELECTROCARDIOGRAM COMPLETE: CPT | Performed by: NUCLEAR MEDICINE

## 2023-12-27 PROCEDURE — 1123F ACP DISCUSS/DSCN MKR DOCD: CPT | Performed by: NUCLEAR MEDICINE

## 2023-12-27 PROCEDURE — 99213 OFFICE O/P EST LOW 20 MIN: CPT | Performed by: NUCLEAR MEDICINE

## 2023-12-27 PROCEDURE — 3077F SYST BP >= 140 MM HG: CPT | Performed by: NUCLEAR MEDICINE

## 2023-12-27 PROCEDURE — 3078F DIAST BP <80 MM HG: CPT | Performed by: NUCLEAR MEDICINE

## 2023-12-27 NOTE — PROGRESS NOTES
[address] [address]  Dept: [phone]  Dept Fax: [phone]  Loc: [phone]    Visit Date: 12/27/2023    Igor Marr is a 74 y.o. female who presents todayfor:  Chief Complaint   Patient presents with    Follow-up    Hypertension    Hyperlipidemia     Known risk for CAD  No chest pain  No changes in breathing  BP is stable  No dizziness  No syncope  On zetia for hyperlipidemia      HPI:  HPI  Past Medical History:   Diagnosis Date    Hyperlipidemia     Hypertension     Osteopenia       Past Surgical History:   Procedure Laterality Date    CATARACT EXTRACTION Bilateral 12/2022    Dr. Yumiko Ulloa   last 8-2017    [address] [place] Right 04/21/2021    RIGHT FEMUR METATAN performed by Iram Nowak MD at [address] [place] [address] Right 08/08/2018    Endoveous Ablation     Family History   Problem Relation Age of Onset    Colon Cancer Mother     Heart Failure Father     Heart Disease Sister     Breast Cancer Sister 54    Heart Attack Brother     Stomach Cancer Paternal Grandfather     Breast Cancer Paternal Aunt 54    Breast Cancer Paternal Aunt 54    Breast Cancer Maternal Aunt 55    Breast Cancer Maternal Cousin 54    Breast Cancer Maternal Cousin 30     Social History     Tobacco Use    Smoking status: Never    Smokeless tobacco: Never   Substance Use Topics    Alcohol use: Yes     Comment: occasional      Current Outpatient Medications   Medication Sig Dispense Refill    metoprolol succinate (TOPROL XL) 25 MG extended release tablet TAKE 1 TABLET DAILY 90 tablet 3    losartan (COZAAR) 50 MG tablet TAKE 1 TABLET DAILY 90 tablet 1    rosuvastatin (CRESTOR) 20 MG tablet TAKE 1 TABLET DAILY 90 tablet 1    ezetimibe (ZETIA) 10 MG tablet TAKE 1 TABLET DAILY

## 2024-01-09 NOTE — PROGRESS NOTES
[facility] Medicine  [address]  [place] [address]  Phone:  [phone]     Medicare Annual Wellness Visit    Chela March is here for No chief complaint on file.    Assessment & Plan   Encounter for Medicare annual wellness exam         -    Routine health maintenance screening was reviewed as below.    Recommendations for Preventive Services Due: see orders and patient instructions/AVS.  Recommended screening schedule for the next 5-10 years is provided to the patient in written form: see Patient Instructions/AVS.     Return in about 1 year (around 1/11/2025) for Medicare AWV.       Subjective   She had a wonderful [place] with her family.  Everyone was able to come and stayed at her house for a few days.      She gets some pain in her legs and shoulders with the cold weather.  Advil takes the pain away.  She still walks 40 minutes per day.    Lab Results   Component Value Date/Time     12/05/2023 11:30 AM    K 4.9 12/05/2023 11:30 AM     12/05/2023 11:30 AM    CO2 27 12/05/2023 11:30 AM    BUN 15 12/05/2023 11:30 AM    CREATININE 0.7 12/05/2023 11:30 AM    GLUCOSE 100 12/05/2023 11:30 AM    CALCIUM 9.8 12/05/2023 11:30 AM    LABGLOM >60 12/05/2023 11:30 AM      Lab Results   Component Value Date    CHOL 193 12/05/2023    TRIG 125 12/05/2023    HDL 54 12/05/2023    LDLCALC 114 12/05/2023     Lab Results   Component Value Date    ALT 22 12/05/2023    AST 27 12/05/2023          Patient's complete Health Risk Assessment and screening values have been reviewed and are found in Flowsheets. The following problems were reviewed today and where indicated follow up appointments were made and/or referrals ordered.    No Positive Risk Factors identified today.         Objective   Vitals:    01/11/24 0930 01/11/24 0955   BP: (!) 142/82 126/76   Site: Right Upper Arm Right Upper Arm   Position: Sitting Sitting   Cuff Size: Medium Adult Medium Adult   Pulse: 53    Resp: 16    Temp: 97.6 °F

## 2024-01-10 SDOH — ECONOMIC STABILITY: FOOD INSECURITY: WITHIN THE PAST 12 MONTHS, THE FOOD YOU BOUGHT JUST DIDN'T LAST AND YOU DIDN'T HAVE MONEY TO GET MORE.: NEVER TRUE

## 2024-01-10 SDOH — ECONOMIC STABILITY: HOUSING INSECURITY
IN THE LAST 12 MONTHS, WAS THERE A TIME WHEN YOU DID NOT HAVE A STEADY PLACE TO SLEEP OR SLEPT IN A SHELTER (INCLUDING NOW)?: NO

## 2024-01-10 SDOH — ECONOMIC STABILITY: TRANSPORTATION INSECURITY
IN THE PAST 12 MONTHS, HAS LACK OF TRANSPORTATION KEPT YOU FROM MEETINGS, WORK, OR FROM GETTING THINGS NEEDED FOR DAILY LIVING?: NO

## 2024-01-10 SDOH — ECONOMIC STABILITY: INCOME INSECURITY: HOW HARD IS IT FOR YOU TO PAY FOR THE VERY BASICS LIKE FOOD, HOUSING, MEDICAL CARE, AND HEATING?: NOT HARD AT ALL

## 2024-01-10 SDOH — HEALTH STABILITY: PHYSICAL HEALTH: ON AVERAGE, HOW MANY DAYS PER WEEK DO YOU ENGAGE IN MODERATE TO STRENUOUS EXERCISE (LIKE A BRISK WALK)?: 5 DAYS

## 2024-01-10 SDOH — HEALTH STABILITY: PHYSICAL HEALTH: ON AVERAGE, HOW MANY MINUTES DO YOU ENGAGE IN EXERCISE AT THIS LEVEL?: 40 MIN

## 2024-01-10 SDOH — ECONOMIC STABILITY: FOOD INSECURITY: WITHIN THE PAST 12 MONTHS, YOU WORRIED THAT YOUR FOOD WOULD RUN OUT BEFORE YOU GOT MONEY TO BUY MORE.: NEVER TRUE

## 2024-01-10 ASSESSMENT — LIFESTYLE VARIABLES
HAVE YOU OR SOMEONE ELSE BEEN INJURED AS A RESULT OF YOUR DRINKING: 0
HOW OFTEN DURING THE LAST YEAR HAVE YOU NEEDED AN ALCOHOLIC DRINK FIRST THING IN THE MORNING TO GET YOURSELF GOING AFTER A NIGHT OF HEAVY DRINKING: NEVER
HOW MANY STANDARD DRINKS CONTAINING ALCOHOL DO YOU HAVE ON A TYPICAL DAY: 1 OR 2
HOW OFTEN DURING THE LAST YEAR HAVE YOU FOUND THAT YOU WERE NOT ABLE TO STOP DRINKING ONCE YOU HAD STARTED: 0
HOW OFTEN DO YOU HAVE SIX OR MORE DRINKS ON ONE OCCASION: 1
HAS A RELATIVE, FRIEND, DOCTOR, OR ANOTHER HEALTH PROFESSIONAL EXPRESSED CONCERN ABOUT YOUR DRINKING OR SUGGESTED YOU CUT DOWN: NO
HOW OFTEN DO YOU HAVE A DRINK CONTAINING ALCOHOL: 5
HOW OFTEN DURING THE LAST YEAR HAVE YOU FOUND THAT YOU WERE NOT ABLE TO STOP DRINKING ONCE YOU HAD STARTED: NEVER
HOW OFTEN DURING THE LAST YEAR HAVE YOU HAD A FEELING OF GUILT OR REMORSE AFTER DRINKING: NEVER
HOW OFTEN DURING THE LAST YEAR HAVE YOU BEEN UNABLE TO REMEMBER WHAT HAPPENED THE NIGHT BEFORE BECAUSE YOU HAD BEEN DRINKING: NEVER
HOW OFTEN DURING THE LAST YEAR HAVE YOU HAD A FEELING OF GUILT OR REMORSE AFTER DRINKING: 0
HOW OFTEN DURING THE LAST YEAR HAVE YOU FAILED TO DO WHAT WAS NORMALLY EXPECTED FROM YOU BECAUSE OF DRINKING: NEVER
HAVE YOU OR SOMEONE ELSE BEEN INJURED AS A RESULT OF YOUR DRINKING: NO
HOW OFTEN DO YOU HAVE A DRINK CONTAINING ALCOHOL: 4 OR MORE TIMES A WEEK
HOW MANY STANDARD DRINKS CONTAINING ALCOHOL DO YOU HAVE ON A TYPICAL DAY: 1
HAS A RELATIVE, FRIEND, DOCTOR, OR ANOTHER HEALTH PROFESSIONAL EXPRESSED CONCERN ABOUT YOUR DRINKING OR SUGGESTED YOU CUT DOWN: 0
HOW OFTEN DURING THE LAST YEAR HAVE YOU NEEDED AN ALCOHOLIC DRINK FIRST THING IN THE MORNING TO GET YOURSELF GOING AFTER A NIGHT OF HEAVY DRINKING: 0
HOW OFTEN DURING THE LAST YEAR HAVE YOU BEEN UNABLE TO REMEMBER WHAT HAPPENED THE NIGHT BEFORE BECAUSE YOU HAD BEEN DRINKING: 0
HOW OFTEN DURING THE LAST YEAR HAVE YOU FAILED TO DO WHAT WAS NORMALLY EXPECTED FROM YOU BECAUSE OF DRINKING: 0

## 2024-01-10 ASSESSMENT — PATIENT HEALTH QUESTIONNAIRE - PHQ9
SUM OF ALL RESPONSES TO PHQ9 QUESTIONS 1 & 2: 0
SUM OF ALL RESPONSES TO PHQ QUESTIONS 1-9: 0
1. LITTLE INTEREST OR PLEASURE IN DOING THINGS: 0
SUM OF ALL RESPONSES TO PHQ QUESTIONS 1-9: 0
2. FEELING DOWN, DEPRESSED OR HOPELESS: 0

## 2024-01-11 ENCOUNTER — OFFICE VISIT (OUTPATIENT)
Dept: FAMILY MEDICINE CLINIC | Age: 74
End: 2024-01-11
Payer: MEDICARE

## 2024-01-11 VITALS
BODY MASS INDEX: 29.02 KG/M2 | HEIGHT: 63 IN | HEART RATE: 53 BPM | SYSTOLIC BLOOD PRESSURE: 126 MMHG | RESPIRATION RATE: 16 BRPM | TEMPERATURE: 97.6 F | WEIGHT: 163.8 LBS | DIASTOLIC BLOOD PRESSURE: 76 MMHG | OXYGEN SATURATION: 97 %

## 2024-01-11 DIAGNOSIS — Z00.00 ENCOUNTER FOR MEDICARE ANNUAL WELLNESS EXAM: Primary | ICD-10-CM

## 2024-01-11 PROCEDURE — G0439 PPPS, SUBSEQ VISIT: HCPCS | Performed by: FAMILY MEDICINE

## 2024-01-11 PROCEDURE — 3074F SYST BP LT 130 MM HG: CPT | Performed by: FAMILY MEDICINE

## 2024-01-11 PROCEDURE — 3078F DIAST BP <80 MM HG: CPT | Performed by: FAMILY MEDICINE

## 2024-01-11 PROCEDURE — 1123F ACP DISCUSS/DSCN MKR DOCD: CPT | Performed by: FAMILY MEDICINE

## 2024-05-11 DIAGNOSIS — E78.00 PURE HYPERCHOLESTEROLEMIA: ICD-10-CM

## 2024-05-11 DIAGNOSIS — I10 ESSENTIAL HYPERTENSION: ICD-10-CM

## 2024-05-13 RX ORDER — LOSARTAN POTASSIUM 50 MG/1
TABLET ORAL
Qty: 90 TABLET | Refills: 1 | Status: SHIPPED | OUTPATIENT
Start: 2024-05-13

## 2024-05-13 RX ORDER — ROSUVASTATIN CALCIUM 20 MG/1
TABLET, COATED ORAL
Qty: 90 TABLET | Refills: 1 | Status: SHIPPED | OUTPATIENT
Start: 2024-05-13

## 2024-05-13 NOTE — TELEPHONE ENCOUNTER
Last visit- 1/11/2024  Next visit- Visit date not found    Requested Prescriptions     Pending Prescriptions Disp Refills    rosuvastatin (CRESTOR) 20 MG tablet [Pharmacy Med Name: ROSUVASTATIN TAB 20MG] 90 tablet 1     Sig: TAKE 1 TABLET DAILY    losartan (COZAAR) 50 MG tablet [Pharmacy Med Name: LOSARTAN TAB 50MG] 90 tablet 1     Sig: TAKE 1 TABLET DAILY

## 2024-05-23 DIAGNOSIS — E78.00 PURE HYPERCHOLESTEROLEMIA: ICD-10-CM

## 2024-05-23 RX ORDER — EZETIMIBE 10 MG/1
TABLET ORAL
Qty: 90 TABLET | Refills: 1 | Status: SHIPPED | OUTPATIENT
Start: 2024-05-23

## 2024-11-06 DIAGNOSIS — I10 ESSENTIAL HYPERTENSION: ICD-10-CM

## 2024-11-06 DIAGNOSIS — E78.00 PURE HYPERCHOLESTEROLEMIA: ICD-10-CM

## 2024-11-07 RX ORDER — ROSUVASTATIN CALCIUM 20 MG/1
TABLET, COATED ORAL
Qty: 90 TABLET | Refills: 0 | Status: SHIPPED | OUTPATIENT
Start: 2024-11-07

## 2024-11-07 RX ORDER — LOSARTAN POTASSIUM 50 MG/1
TABLET ORAL
Qty: 90 TABLET | Refills: 0 | Status: SHIPPED | OUTPATIENT
Start: 2024-11-07

## 2024-11-10 DIAGNOSIS — E78.00 PURE HYPERCHOLESTEROLEMIA: ICD-10-CM

## 2024-11-11 RX ORDER — EZETIMIBE 10 MG/1
TABLET ORAL
Qty: 90 TABLET | Refills: 1 | Status: SHIPPED | OUTPATIENT
Start: 2024-11-11

## 2024-11-11 RX ORDER — METOPROLOL SUCCINATE 25 MG/1
TABLET, EXTENDED RELEASE ORAL
Qty: 90 TABLET | Refills: 3 | Status: SHIPPED | OUTPATIENT
Start: 2024-11-11

## 2024-11-11 NOTE — TELEPHONE ENCOUNTER
Last visit- 1/11/2024  Next visit- 1/16/2025    Requested Prescriptions     Pending Prescriptions Disp Refills    metoprolol succinate (TOPROL XL) 25 MG extended release tablet 90 tablet 3     Sig: TAKE 1 TABLET DAILY

## 2025-01-08 ENCOUNTER — OFFICE VISIT (OUTPATIENT)
Dept: CARDIOLOGY CLINIC | Age: 75
End: 2025-01-08
Payer: MEDICARE

## 2025-01-08 ENCOUNTER — HOSPITAL ENCOUNTER (OUTPATIENT)
Dept: WOMENS IMAGING | Age: 75
Discharge: HOME | End: 2025-01-08
Payer: MEDICARE

## 2025-01-08 VITALS
WEIGHT: 163 LBS | DIASTOLIC BLOOD PRESSURE: 78 MMHG | BODY MASS INDEX: 28.88 KG/M2 | HEART RATE: 80 BPM | SYSTOLIC BLOOD PRESSURE: 160 MMHG | HEIGHT: 63 IN

## 2025-01-08 VITALS — BODY MASS INDEX: 28.88 KG/M2 | WEIGHT: 163 LBS

## 2025-01-08 DIAGNOSIS — I10 PRIMARY HYPERTENSION: ICD-10-CM

## 2025-01-08 DIAGNOSIS — Z12.31 VISIT FOR SCREENING MAMMOGRAM: ICD-10-CM

## 2025-01-08 DIAGNOSIS — E78.01 FAMILIAL HYPERCHOLESTEROLEMIA: ICD-10-CM

## 2025-01-08 DIAGNOSIS — R06.02 SHORTNESS OF BREATH: ICD-10-CM

## 2025-01-08 DIAGNOSIS — Z82.49 FAMILY HISTORY OF HEART DISEASE: Primary | ICD-10-CM

## 2025-01-08 PROCEDURE — 99214 OFFICE O/P EST MOD 30 MIN: CPT | Performed by: NUCLEAR MEDICINE

## 2025-01-08 PROCEDURE — 3077F SYST BP >= 140 MM HG: CPT | Performed by: NUCLEAR MEDICINE

## 2025-01-08 PROCEDURE — 3078F DIAST BP <80 MM HG: CPT | Performed by: NUCLEAR MEDICINE

## 2025-01-08 PROCEDURE — 1159F MED LIST DOCD IN RCRD: CPT | Performed by: NUCLEAR MEDICINE

## 2025-01-08 PROCEDURE — 77063 BREAST TOMOSYNTHESIS BI: CPT

## 2025-01-08 PROCEDURE — 93000 ELECTROCARDIOGRAM COMPLETE: CPT | Performed by: NUCLEAR MEDICINE

## 2025-01-08 PROCEDURE — 1123F ACP DISCUSS/DSCN MKR DOCD: CPT | Performed by: NUCLEAR MEDICINE

## 2025-01-08 NOTE — PROGRESS NOTES
[facility] [place] [facility]  [facility] CARDIOLOGY  [address]  [address]  [facility] [address]  Dept: [phone]  Dept Fax: [phone]  Loc: [phone]    Visit Date: 1/8/2025    Chela March is a 74 y.o. female who presents todayfor:  Chief Complaint   Patient presents with    Follow-up    Hypertension    Hyperlipidemia   Known risk for CAD  Higher BP today   Usually better   No dizziness  No syncope  No chest pain   Some baseline dyspnea  On statins for hyperlipidemia  No issues with the meds  Major FH of CAD   Know PVCS       HPI:  HPI  Past Medical History:   Diagnosis Date    Hyperlipidemia     Hypertension     Osteopenia       Past Surgical History:   Procedure Laterality Date    CATARACT EXTRACTION Bilateral 12/2022    Dr. Oscar    COLONOSCOPY      Dr Renee   last 8-2017    FEMUR FRACTURE SURGERY Right 04/21/2021    RIGHT FEMUR METATAN performed by Jeffy Jones MD at [facility] OR    INCISIONAL BREAST BIOPSY Left 1976    INGUINAL HERNIA REPAIR      KNEE SURGERY      SHOULDER SURGERY      TUBAL LIGATION      VEIN SURGERY Right 08/08/2018    Endoveous Ablation     Family History   Problem Relation Age of Onset    Colon Cancer Mother     Heart Failure Father     Heart Disease Sister     Breast Cancer Sister 55    Heart Attack Brother     Stomach Cancer Paternal Grandfather     Breast Cancer Paternal Aunt 55    Breast Cancer Paternal Aunt 55    Breast Cancer Maternal Aunt 55    Breast Cancer Maternal Cousin 55    Breast Cancer Maternal Cousin 30     Social History     Tobacco Use    Smoking status: Never    Smokeless tobacco: Never   Substance Use Topics    Alcohol use: Yes     Comment: occasional      Current Outpatient Medications   Medication Sig Dispense Refill    ezetimibe (ZETIA) 10 MG tablet TAKE 1 TABLET DAILY 90 tablet 1    metoprolol succinate (TOPROL XL) 25 MG extended release tablet TAKE 1 TABLET DAILY 90 tablet 3    rosuvastatin (CRESTOR) 20 MG tablet TAKE 1

## 2025-01-16 ENCOUNTER — HOSPITAL ENCOUNTER (OUTPATIENT)
Age: 75
Discharge: HOME | End: 2025-01-18
Attending: NUCLEAR MEDICINE
Payer: MEDICARE

## 2025-01-16 ENCOUNTER — APPOINTMENT (OUTPATIENT)
Age: 75
End: 2025-01-16
Attending: NUCLEAR MEDICINE
Payer: MEDICARE

## 2025-01-16 DIAGNOSIS — E78.01 FAMILIAL HYPERCHOLESTEROLEMIA: ICD-10-CM

## 2025-01-16 DIAGNOSIS — Z82.49 FAMILY HISTORY OF HEART DISEASE: ICD-10-CM

## 2025-01-16 DIAGNOSIS — R06.02 SHORTNESS OF BREATH: ICD-10-CM

## 2025-01-16 DIAGNOSIS — I10 PRIMARY HYPERTENSION: ICD-10-CM

## 2025-01-16 LAB
ECHO AO ASC DIAM: 2.8 CM
ECHO AR MAX VEL PISA: 3.9 M/S
ECHO AV CUSP MM: 1.6 CM
ECHO AV PEAK GRADIENT: 6 MMHG
ECHO AV PEAK VELOCITY: 1.2 M/S
ECHO AV REGURGITANT PHT: 681 MS
ECHO AV VELOCITY RATIO: 0.67
ECHO LA AREA 2C: 14.3 CM2
ECHO LA AREA 4C: 11.4 CM2
ECHO LA DIAMETER: 4.1 CM
ECHO LA MAJOR AXIS: 4.1 CM
ECHO LA MINOR AXIS: 4.3 CM
ECHO LA VOL BP: 32 ML (ref 22–52)
ECHO LA VOL MOD A2C: 38 ML (ref 22–52)
ECHO LA VOL MOD A4C: 26 ML (ref 22–52)
ECHO LV E' LATERAL VELOCITY: 5.4 CM/S
ECHO LV E' SEPTAL VELOCITY: 6.2 CM/S
ECHO LV EJECTION FRACTION BIPLANE: 57 % (ref 55–100)
ECHO LV FRACTIONAL SHORTENING: 30 % (ref 28–44)
ECHO LV INTERNAL DIMENSION DIASTOLIC: 4.3 CM (ref 3.9–5.3)
ECHO LV INTERNAL DIMENSION SYSTOLIC: 3 CM
ECHO LV ISOVOLUMETRIC RELAXATION TIME (IVRT): 85 MS
ECHO LV IVSD: 0.8 CM (ref 0.6–0.9)
ECHO LV MASS 2D: 105.3 G (ref 67–162)
ECHO LV POSTERIOR WALL DIASTOLIC: 0.8 CM (ref 0.6–0.9)
ECHO LV RELATIVE WALL THICKNESS RATIO: 0.37
ECHO LVOT PEAK GRADIENT: 3 MMHG
ECHO LVOT PEAK VELOCITY: 0.8 M/S
ECHO MV A VELOCITY: 0.99 M/S
ECHO MV E DECELERATION TIME (DT): 245 MS
ECHO MV E VELOCITY: 0.88 M/S
ECHO MV E/A RATIO: 0.89
ECHO MV E/E' LATERAL: 16.3
ECHO MV E/E' RATIO (AVERAGED): 15.24
ECHO MV E/E' SEPTAL: 14.19
ECHO MV REGURGITANT PEAK GRADIENT: 92 MMHG
ECHO MV REGURGITANT PEAK VELOCITY: 4.8 M/S
ECHO PV MAX VELOCITY: 0.6 M/S
ECHO PV PEAK GRADIENT: 1 MMHG
ECHO RV INTERNAL DIMENSION: 1.9 CM
ECHO RV TAPSE: 2.3 CM (ref 1.7–?)
ECHO TV E WAVE: 0.4 M/S
ECHO TV REGURGITANT MAX VELOCITY: 2.4 M/S
ECHO TV REGURGITANT PEAK GRADIENT: 23 MMHG

## 2025-01-16 PROCEDURE — 93306 TTE W/DOPPLER COMPLETE: CPT

## 2025-01-16 PROCEDURE — 93306 TTE W/DOPPLER COMPLETE: CPT | Performed by: NUCLEAR MEDICINE

## 2025-01-16 PROCEDURE — 93225 XTRNL ECG REC<48 HRS REC: CPT

## 2025-01-20 LAB
ACQUISITION DURATION: NORMAL S
AVERAGE HEART RATE: 78 BPM
HOOKUP DATE: NORMAL
HOOKUP TIME: NORMAL
MAX HEART RATE TIME/DATE: NORMAL
MAX HEART RATE: 106 BPM
MIN HEART RATE TIME/DATE: NORMAL
MIN HEART RATE: 61 BPM
NUMBER OF QRS COMPLEXES: NORMAL
NUMBER OF SUPRAVENTRICULAR COUPLETS: 0
NUMBER OF SUPRAVENTRICULAR ECTOPICS: 8
NUMBER OF SUPRAVENTRICULAR ISOLATED BEATS: 6
NUMBER OF VENTRICULAR BIGEMINAL CYCLES: 1261
NUMBER OF VENTRICULAR COUPLETS: 0
NUMBER OF VENTRICULAR ECTOPICS: NORMAL

## 2025-01-21 DIAGNOSIS — R06.02 SHORTNESS OF BREATH: ICD-10-CM

## 2025-01-21 DIAGNOSIS — R00.2 PALPITATIONS: Primary | ICD-10-CM

## 2025-01-21 RX ORDER — FLECAINIDE ACETATE 100 MG/1
100 TABLET ORAL 2 TIMES DAILY
Qty: 180 TABLET | Refills: 2 | Status: SHIPPED | OUTPATIENT
Start: 2025-01-21

## 2025-01-21 RX ORDER — FLECAINIDE ACETATE 100 MG/1
100 TABLET ORAL 2 TIMES DAILY
COMMUNITY

## 2025-01-21 NOTE — TELEPHONE ENCOUNTER
Left voicemail for patient to call back to schedule candice stress test for soonest upcoming convenient/available date, and to schedule a repeat 24 hr holter for 3 mos from now.

## 2025-01-21 NOTE — TELEPHONE ENCOUNTER
Hannah. Start flecainide 100 mg bid  Repeat holter in 3 months and follow up after  Do a cardiolite stress test in the mean time

## 2025-01-21 NOTE — TELEPHONE ENCOUNTER
Pt notified. Still asymptomatic at this time.   Flecainide 100 bid pended and med list updated.   Orders given to scheduling.

## 2025-01-22 NOTE — TELEPHONE ENCOUNTER
Patient returned call to schedule florecita stress for now and monitor for April. Florecita scheduled for 1/30/25 with arrival time of 9:00 am. All instructions reviewed with patient and mailed this date.

## 2025-01-23 NOTE — PROGRESS NOTES
[facility] Medicine  [address]  [place] [address]  Phone:  [phone]     Medicare Annual Wellness Visit    Chela March is here for Medicare AWV    Assessment & Plan   Encounter for Medicare annual wellness exam  -     Routine healthcare maintenance was reviewed as below.  -     Comprehensive Metabolic Panel; Future  -     CBC with Auto Differential; Future    Pure hypercholesterolemia  -    Will check routine labs.  A healthy diet and routine physical activity encouraged.   -    Lipid Panel; Future    Postmenopausal  -     DEXA BONE DENSITY 2 SITES; Future       Return in about 1 year (around 1/28/2026) for Medicare AWV.       Eldon Isaac has a stress test on Thursday.  Over Christmas she was having some palpitations but had her whole family at her house at [place] for 3 days.  She has had some abnormal rhythms so was started on Flecainide.  She denies SOB.  She walks 8000-12,000 steps/day.    Lab Results   Component Value Date     12/05/2023    K 4.9 12/05/2023     12/05/2023    CO2 27 12/05/2023    BUN 15 12/05/2023    CREATININE 0.7 12/05/2023    GLUCOSE 100 12/05/2023    CALCIUM 9.8 12/05/2023    BILITOT 0.6 12/05/2023    ALKPHOS 64 12/05/2023    AST 27 12/05/2023    ALT 22 12/05/2023    LABGLOM >60 12/05/2023     Lab Results   Component Value Date    CHOL 193 12/05/2023    TRIG 125 12/05/2023    HDL 54 12/05/2023     Lab Results   Component Value Date    ALT 22 12/05/2023    AST 27 12/05/2023          Patient's complete Health Risk Assessment and screening values have been reviewed and are found in Flowsheets. The following problems were reviewed today and where indicated follow up appointments were made and/or referrals ordered.    No Positive Risk Factors identified today.         Objective   Vitals:    01/28/25 0827   BP: 130/70   Site: Right Upper Arm   Position: Sitting   Cuff Size: Medium Adult   Pulse: 63   Resp: 14   Temp: 97.9 °F (36.6 °C)   TempSrc:

## 2025-01-25 SDOH — HEALTH STABILITY: PHYSICAL HEALTH: ON AVERAGE, HOW MANY DAYS PER WEEK DO YOU ENGAGE IN MODERATE TO STRENUOUS EXERCISE (LIKE A BRISK WALK)?: 6 DAYS

## 2025-01-25 SDOH — HEALTH STABILITY: PHYSICAL HEALTH: ON AVERAGE, HOW MANY MINUTES DO YOU ENGAGE IN EXERCISE AT THIS LEVEL?: 40 MIN

## 2025-01-25 ASSESSMENT — LIFESTYLE VARIABLES
HOW MANY STANDARD DRINKS CONTAINING ALCOHOL DO YOU HAVE ON A TYPICAL DAY: 1 OR 2
HOW MANY STANDARD DRINKS CONTAINING ALCOHOL DO YOU HAVE ON A TYPICAL DAY: 1
HOW OFTEN DO YOU HAVE A DRINK CONTAINING ALCOHOL: 4
HOW OFTEN DO YOU HAVE SIX OR MORE DRINKS ON ONE OCCASION: 1
HOW OFTEN DO YOU HAVE A DRINK CONTAINING ALCOHOL: 2-3 TIMES A WEEK

## 2025-01-25 ASSESSMENT — PATIENT HEALTH QUESTIONNAIRE - PHQ9
1. LITTLE INTEREST OR PLEASURE IN DOING THINGS: NOT AT ALL
2. FEELING DOWN, DEPRESSED OR HOPELESS: NOT AT ALL
SUM OF ALL RESPONSES TO PHQ9 QUESTIONS 1 & 2: 0
SUM OF ALL RESPONSES TO PHQ QUESTIONS 1-9: 0

## 2025-01-28 ENCOUNTER — OFFICE VISIT (OUTPATIENT)
Dept: FAMILY MEDICINE CLINIC | Age: 75
End: 2025-01-28

## 2025-01-28 VITALS
SYSTOLIC BLOOD PRESSURE: 130 MMHG | DIASTOLIC BLOOD PRESSURE: 70 MMHG | TEMPERATURE: 97.9 F | HEIGHT: 63 IN | RESPIRATION RATE: 14 BRPM | WEIGHT: 166 LBS | HEART RATE: 63 BPM | BODY MASS INDEX: 29.41 KG/M2

## 2025-01-28 DIAGNOSIS — Z78.0 POSTMENOPAUSAL: ICD-10-CM

## 2025-01-28 DIAGNOSIS — Z00.00 ENCOUNTER FOR MEDICARE ANNUAL WELLNESS EXAM: Primary | ICD-10-CM

## 2025-01-28 DIAGNOSIS — E55.9 VITAMIN D DEFICIENCY: ICD-10-CM

## 2025-01-28 DIAGNOSIS — E78.00 PURE HYPERCHOLESTEROLEMIA: ICD-10-CM

## 2025-01-28 DIAGNOSIS — I10 ESSENTIAL HYPERTENSION: ICD-10-CM

## 2025-01-28 RX ORDER — ROSUVASTATIN CALCIUM 20 MG/1
20 TABLET, COATED ORAL DAILY
Qty: 90 TABLET | Refills: 3 | Status: SHIPPED | OUTPATIENT
Start: 2025-01-28

## 2025-01-28 RX ORDER — METOPROLOL SUCCINATE 25 MG/1
TABLET, EXTENDED RELEASE ORAL
Qty: 90 TABLET | Refills: 3 | Status: SHIPPED | OUTPATIENT
Start: 2025-01-28

## 2025-01-28 RX ORDER — EZETIMIBE 10 MG/1
10 TABLET ORAL DAILY
Qty: 90 TABLET | Refills: 3 | Status: SHIPPED | OUTPATIENT
Start: 2025-01-28

## 2025-01-28 RX ORDER — LOSARTAN POTASSIUM 50 MG/1
50 TABLET ORAL DAILY
Qty: 90 TABLET | Refills: 3 | Status: SHIPPED | OUTPATIENT
Start: 2025-01-28

## 2025-01-30 ENCOUNTER — HOSPITAL ENCOUNTER (OUTPATIENT)
Dept: NUCLEAR MEDICINE | Age: 75
Discharge: HOME | End: 2025-01-30
Attending: NUCLEAR MEDICINE
Payer: MEDICARE

## 2025-01-30 ENCOUNTER — HOSPITAL ENCOUNTER (OUTPATIENT)
Age: 75
Discharge: HOME | End: 2025-02-01
Attending: NUCLEAR MEDICINE
Payer: MEDICARE

## 2025-01-30 VITALS — HEIGHT: 63 IN | BODY MASS INDEX: 28.35 KG/M2 | WEIGHT: 160 LBS

## 2025-01-30 DIAGNOSIS — R00.2 PALPITATIONS: ICD-10-CM

## 2025-01-30 DIAGNOSIS — R06.02 SHORTNESS OF BREATH: ICD-10-CM

## 2025-01-30 LAB
ECHO BSA: 1.8 M2
NUC STRESS EJECTION FRACTION: 73 %
STRESS BASELINE DIAS BP: 74 MMHG
STRESS BASELINE HR: 59 BPM
STRESS BASELINE SYS BP: 157 MMHG
STRESS ESTIMATED WORKLOAD: 1 METS
STRESS PEAK DIAS BP: 74 MMHG
STRESS PEAK SYS BP: 157 MMHG
STRESS PERCENT HR ACHIEVED: 55 %
STRESS POST PEAK HR: 78 BPM
STRESS RATE PRESSURE PRODUCT: NORMAL BPM*MMHG
STRESS STAGE 1 BP: NORMAL MMHG
STRESS STAGE 1 DURATION: 1 MIN:SEC
STRESS STAGE 1 HR: 68 BPM
STRESS STAGE 2 BP: NORMAL MMHG
STRESS STAGE 2 DURATION: 1 MIN:SEC
STRESS STAGE 2 HR: 75 BPM
STRESS STAGE 3 BP: NORMAL MMHG
STRESS STAGE 3 DURATION: 1 MIN:SEC
STRESS STAGE 3 HR: 73 BPM
STRESS STAGE RECOVERY 1 BP: NORMAL MMHG
STRESS STAGE RECOVERY 1 DURATION: 1 MIN:SEC
STRESS STAGE RECOVERY 1 HR: 69 BPM
STRESS STAGE RECOVERY 2 BP: NORMAL MMHG
STRESS STAGE RECOVERY 2 DURATION: 1 MIN:SEC
STRESS STAGE RECOVERY 2 HR: 71 BPM
STRESS STAGE RECOVERY 3 BP: NORMAL MMHG
STRESS STAGE RECOVERY 3 DURATION: 1 MIN:SEC
STRESS STAGE RECOVERY 3 HR: 73 BPM
STRESS STAGE RECOVERY 4 BP: NORMAL MMHG
STRESS STAGE RECOVERY 4 DURATION: 1 MIN:SEC
STRESS STAGE RECOVERY 4 HR: 69 BPM
STRESS TARGET HR: 143 BPM

## 2025-01-30 PROCEDURE — 6360000002 HC RX W HCPCS: Performed by: NUCLEAR MEDICINE

## 2025-01-30 PROCEDURE — 93017 CV STRESS TEST TRACING ONLY: CPT

## 2025-01-30 PROCEDURE — A9500 TC99M SESTAMIBI: HCPCS | Performed by: NUCLEAR MEDICINE

## 2025-01-30 PROCEDURE — 78452 HT MUSCLE IMAGE SPECT MULT: CPT

## 2025-01-30 PROCEDURE — 3430000000 HC RX DIAGNOSTIC RADIOPHARMACEUTICAL: Performed by: NUCLEAR MEDICINE

## 2025-01-30 RX ORDER — TETRAKIS(2-METHOXYISOBUTYLISOCYANIDE)COPPER(I) TETRAFLUOROBORATE 1 MG/ML
10.2 INJECTION, POWDER, LYOPHILIZED, FOR SOLUTION INTRAVENOUS
Status: COMPLETED | OUTPATIENT
Start: 2025-01-30 | End: 2025-01-30

## 2025-01-30 RX ORDER — REGADENOSON 0.08 MG/ML
0.4 INJECTION, SOLUTION INTRAVENOUS
Status: COMPLETED | OUTPATIENT
Start: 2025-01-30 | End: 2025-01-30

## 2025-01-30 RX ORDER — TETRAKIS(2-METHOXYISOBUTYLISOCYANIDE)COPPER(I) TETRAFLUOROBORATE 1 MG/ML
32.9 INJECTION, POWDER, LYOPHILIZED, FOR SOLUTION INTRAVENOUS
Status: COMPLETED | OUTPATIENT
Start: 2025-01-30 | End: 2025-01-30

## 2025-01-30 RX ADMIN — REGADENOSON 0.4 MG: 0.08 INJECTION, SOLUTION INTRAVENOUS at 11:07

## 2025-01-30 RX ADMIN — Medication 10.2 MILLICURIE: at 09:30

## 2025-01-30 RX ADMIN — Medication 32.9 MILLICURIE: at 10:52

## 2025-02-11 ENCOUNTER — HOSPITAL ENCOUNTER (OUTPATIENT)
Age: 75
Discharge: HOME | End: 2025-02-11
Payer: MEDICARE

## 2025-02-11 DIAGNOSIS — E78.00 PURE HYPERCHOLESTEROLEMIA: ICD-10-CM

## 2025-02-11 DIAGNOSIS — Z00.00 ENCOUNTER FOR MEDICARE ANNUAL WELLNESS EXAM: ICD-10-CM

## 2025-02-11 DIAGNOSIS — E55.9 VITAMIN D DEFICIENCY: ICD-10-CM

## 2025-02-11 LAB
25(OH)D3 SERPL-MCNC: 44 NG/ML (ref 30–100)
ALBUMIN SERPL BCG-MCNC: 4.2 G/DL (ref 3.5–5.1)
ALP SERPL-CCNC: 62 U/L (ref 38–126)
ALT SERPL W/O P-5'-P-CCNC: 20 U/L (ref 11–66)
ANION GAP SERPL CALC-SCNC: 13 MEQ/L (ref 8–16)
AST SERPL-CCNC: 24 U/L (ref 5–40)
BASOPHILS ABSOLUTE: 0 THOU/MM3 (ref 0–0.1)
BASOPHILS NFR BLD AUTO: 0.9 %
BILIRUB SERPL-MCNC: 0.4 MG/DL (ref 0.3–1.2)
BUN SERPL-MCNC: 16 MG/DL (ref 7–22)
CALCIUM SERPL-MCNC: 9.5 MG/DL (ref 8.5–10.5)
CHLORIDE SERPL-SCNC: 102 MEQ/L (ref 98–111)
CHOLEST SERPL-MCNC: 171 MG/DL (ref 100–199)
CO2 SERPL-SCNC: 27 MEQ/L (ref 23–33)
CREAT SERPL-MCNC: 0.7 MG/DL (ref 0.4–1.2)
DEPRECATED RDW RBC AUTO: 50 FL (ref 35–45)
EOSINOPHIL NFR BLD AUTO: 2.7 %
EOSINOPHILS ABSOLUTE: 0.1 THOU/MM3 (ref 0–0.4)
ERYTHROCYTE [DISTWIDTH] IN BLOOD BY AUTOMATED COUNT: 13.2 % (ref 11.5–14.5)
GFR SERPL CREATININE-BSD FRML MDRD: 89 ML/MIN/1.73M2
GLUCOSE SERPL-MCNC: 82 MG/DL (ref 70–108)
HCT VFR BLD AUTO: 41.3 % (ref 37–47)
HDLC SERPL-MCNC: 51 MG/DL
HGB BLD-MCNC: 13.2 GM/DL (ref 12–16)
IMM GRANULOCYTES # BLD AUTO: 0.01 THOU/MM3 (ref 0–0.07)
IMM GRANULOCYTES NFR BLD AUTO: 0.2 %
LDLC SERPL CALC-MCNC: 74 MG/DL
LYMPHOCYTES ABSOLUTE: 1.4 THOU/MM3 (ref 1–4.8)
LYMPHOCYTES NFR BLD AUTO: 30.1 %
MCH RBC QN AUTO: 32.7 PG (ref 26–33)
MCHC RBC AUTO-ENTMCNC: 32 GM/DL (ref 32.2–35.5)
MCV RBC AUTO: 102.2 FL (ref 81–99)
MONOCYTES ABSOLUTE: 0.4 THOU/MM3 (ref 0.4–1.3)
MONOCYTES NFR BLD AUTO: 8.5 %
NEUTROPHILS ABSOLUTE: 2.6 THOU/MM3 (ref 1.8–7.7)
NEUTROPHILS NFR BLD AUTO: 57.6 %
NRBC BLD AUTO-RTO: 0 /100 WBC
PLATELET # BLD AUTO: 198 THOU/MM3 (ref 130–400)
PMV BLD AUTO: 12.5 FL (ref 9.4–12.4)
POTASSIUM SERPL-SCNC: 4.9 MEQ/L (ref 3.5–5.2)
PROT SERPL-MCNC: 7 G/DL (ref 6.1–8)
RBC # BLD AUTO: 4.04 MILL/MM3 (ref 4.2–5.4)
SODIUM SERPL-SCNC: 142 MEQ/L (ref 135–145)
TRIGL SERPL-MCNC: 229 MG/DL (ref 0–199)
WBC # BLD AUTO: 4.5 THOU/MM3 (ref 4.8–10.8)

## 2025-02-11 PROCEDURE — 36415 COLL VENOUS BLD VENIPUNCTURE: CPT

## 2025-02-11 PROCEDURE — 80061 LIPID PANEL: CPT

## 2025-02-11 PROCEDURE — 80053 COMPREHEN METABOLIC PANEL: CPT

## 2025-02-11 PROCEDURE — 85025 COMPLETE CBC W/AUTO DIFF WBC: CPT

## 2025-02-11 PROCEDURE — 82306 VITAMIN D 25 HYDROXY: CPT

## 2025-04-21 ENCOUNTER — HOSPITAL ENCOUNTER (OUTPATIENT)
Age: 75
Discharge: HOME | End: 2025-04-23
Attending: NUCLEAR MEDICINE
Payer: MEDICARE

## 2025-04-21 ENCOUNTER — APPOINTMENT (OUTPATIENT)
Age: 75
End: 2025-04-21
Attending: NUCLEAR MEDICINE
Payer: MEDICARE

## 2025-04-21 DIAGNOSIS — R06.02 SHORTNESS OF BREATH: ICD-10-CM

## 2025-04-21 DIAGNOSIS — R00.2 PALPITATIONS: ICD-10-CM

## 2025-04-21 PROCEDURE — 93225 XTRNL ECG REC<48 HRS REC: CPT

## 2025-04-27 LAB
ACQUISITION DURATION: NORMAL S
AVERAGE HEART RATE: 65 BPM
HOOKUP DATE: NORMAL
HOOKUP TIME: NORMAL
MAX HEART RATE TIME/DATE: NORMAL
MAX HEART RATE: 89 BPM
MIN HEART RATE TIME/DATE: NORMAL
MIN HEART RATE: 50 BPM
NUMBER OF QRS COMPLEXES: NORMAL
NUMBER OF SUPRAVENTRICULAR COUPLETS: 0
NUMBER OF SUPRAVENTRICULAR ECTOPICS: 1
NUMBER OF SUPRAVENTRICULAR ISOLATED BEATS: 1
NUMBER OF VENTRICULAR BIGEMINAL CYCLES: 0
NUMBER OF VENTRICULAR COUPLETS: 0
NUMBER OF VENTRICULAR ECTOPICS: 127

## (undated) DEVICE — SYRINGE IRRIG 60ML SFT PLIABLE BLB EZ TO GRP 1 HND USE W/

## (undated) DEVICE — GAMMEX® NON-LATEX SIZE 7.5, STERILE NEOPRENE POWDER-FREE SURGICAL GLOVE: Brand: GAMMEX

## (undated) DEVICE — 6619 2 PTNT ISO SYS INCISE AREA&LT;(&GT;&&LT;)&GT;P: Brand: STERI-DRAPE™ IOBAN™ 2

## (undated) DEVICE — SUTURE VCRL + SZ 2-0 L27IN ABSRB UD CP-1 1/2 CIR REV CUT VCP266H

## (undated) DEVICE — PACK-MAJOR

## (undated) DEVICE — 450 ML BOTTLE OF 0.05% CHLORHEXIDINE GLUCONATE IN 99.95% STERILE WATER FOR IRRIGATION, USP AND APPLICATOR.: Brand: IRRISEPT ANTIMICROBIAL WOUND LAVAGE

## (undated) DEVICE — TUBING, SUCTION, 1/4" X 20', STRAIGHT: Brand: MEDLINE INDUSTRIES, INC.

## (undated) DEVICE — ROYAL SILK SURGICAL GOWN, XXL: Brand: CONVERTORS

## (undated) DEVICE — BANDAGE COMPR W6INXL5YD SELF ADH COHESIVE CO FLX

## (undated) DEVICE — SPONGE GZ W4XL4IN COT 12 PLY TYP VII WVN C FLD DSGN

## (undated) DEVICE — APPLICATOR MEDICATED 26 CC SOLUTION HI LT ORNG CHLORAPREP

## (undated) DEVICE — 3.0MM X 1000MM BALL TIP GUIDE ROD: Brand: TRIGEN

## (undated) DEVICE — GLOVE SURG SZ 9 THK91MIL LTX FREE SYN POLYISOPRENE ANTI

## (undated) DEVICE — GLOVE ORANGE PI 8 1/2   MSG9085

## (undated) DEVICE — SPONGE LAP W18XL18IN WHT COT 4 PLY FLD STRUNG RADPQ DISP ST

## (undated) DEVICE — BASIC SINGLE BASIN BTC-LF: Brand: MEDLINE INDUSTRIES, INC.

## (undated) DEVICE — ADHESIVE SKIN CLSR 0.7ML TOP DERMBND ADV

## (undated) DEVICE — DRESSING,GAUZE,XEROFORM,CURAD,5"X9",ST: Brand: CURAD

## (undated) DEVICE — BANDAGE,GAUZE,4.5"X4.1YD,STERILE,LF: Brand: MEDLINE

## (undated) DEVICE — 4.0MM SHORT PILOT DRILL WITH AO CONNECTOR: Brand: TRIGEN

## (undated) DEVICE — GUIDE PIN 3.2MM X 343MM: Brand: TRIGEN